# Patient Record
Sex: FEMALE | Race: WHITE | NOT HISPANIC OR LATINO | Employment: UNEMPLOYED | URBAN - NONMETROPOLITAN AREA
[De-identification: names, ages, dates, MRNs, and addresses within clinical notes are randomized per-mention and may not be internally consistent; named-entity substitution may affect disease eponyms.]

---

## 2023-01-01 ENCOUNTER — APPOINTMENT (OUTPATIENT)
Dept: CT IMAGING | Facility: HOSPITAL | Age: 83
End: 2023-01-01
Payer: MEDICARE

## 2023-01-01 ENCOUNTER — APPOINTMENT (OUTPATIENT)
Dept: MRI IMAGING | Facility: HOSPITAL | Age: 83
End: 2023-01-01
Payer: MEDICARE

## 2023-01-01 ENCOUNTER — APPOINTMENT (OUTPATIENT)
Dept: NUCLEAR MEDICINE | Facility: HOSPITAL | Age: 83
End: 2023-01-01
Payer: MEDICARE

## 2023-01-01 ENCOUNTER — APPOINTMENT (OUTPATIENT)
Dept: ULTRASOUND IMAGING | Facility: HOSPITAL | Age: 83
End: 2023-01-01
Payer: MEDICARE

## 2023-01-01 ENCOUNTER — HOSPITAL ENCOUNTER (INPATIENT)
Facility: HOSPITAL | Age: 83
LOS: 7 days | End: 2023-12-30
Attending: STUDENT IN AN ORGANIZED HEALTH CARE EDUCATION/TRAINING PROGRAM | Admitting: STUDENT IN AN ORGANIZED HEALTH CARE EDUCATION/TRAINING PROGRAM
Payer: MEDICARE

## 2023-01-01 ENCOUNTER — APPOINTMENT (OUTPATIENT)
Dept: GENERAL RADIOLOGY | Facility: HOSPITAL | Age: 83
End: 2023-01-01
Payer: MEDICARE

## 2023-01-01 ENCOUNTER — APPOINTMENT (OUTPATIENT)
Dept: CARDIOLOGY | Facility: HOSPITAL | Age: 83
End: 2023-01-01
Payer: MEDICARE

## 2023-01-01 VITALS
OXYGEN SATURATION: 97 % | HEIGHT: 62 IN | TEMPERATURE: 96.7 F | DIASTOLIC BLOOD PRESSURE: 52 MMHG | BODY MASS INDEX: 43.17 KG/M2 | WEIGHT: 234.6 LBS | HEART RATE: 90 BPM | RESPIRATION RATE: 20 BRPM | SYSTOLIC BLOOD PRESSURE: 98 MMHG

## 2023-01-01 DIAGNOSIS — R41.82 ALTERED MENTAL STATUS, UNSPECIFIED ALTERED MENTAL STATUS TYPE: ICD-10-CM

## 2023-01-01 DIAGNOSIS — N39.0 ACUTE UTI: Primary | ICD-10-CM

## 2023-01-01 LAB
A-A DO2: 8.6 MMHG (ref 0–300)
ABO GROUP BLD: NORMAL
ABO GROUP BLD: NORMAL
ALBUMIN SERPL-MCNC: 2.5 G/DL (ref 3.5–5.2)
ALBUMIN SERPL-MCNC: 2.5 G/DL (ref 3.5–5.2)
ALBUMIN SERPL-MCNC: 2.7 G/DL (ref 3.5–5.2)
ALBUMIN SERPL-MCNC: 2.7 G/DL (ref 3.5–5.2)
ALBUMIN SERPL-MCNC: 2.9 G/DL (ref 3.5–5.2)
ALBUMIN SERPL-MCNC: 2.9 G/DL (ref 3.5–5.2)
ALBUMIN/GLOB SERPL: 0.8 G/DL
ALBUMIN/GLOB SERPL: 0.9 G/DL
ALBUMIN/GLOB SERPL: 1 G/DL
ALP SERPL-CCNC: 193 U/L (ref 39–117)
ALP SERPL-CCNC: 208 U/L (ref 39–117)
ALP SERPL-CCNC: 213 U/L (ref 39–117)
ALP SERPL-CCNC: 245 U/L (ref 39–117)
ALP SERPL-CCNC: 335 U/L (ref 39–117)
ALP SERPL-CCNC: 357 U/L (ref 39–117)
ALT SERPL W P-5'-P-CCNC: 32 U/L (ref 1–33)
ALT SERPL W P-5'-P-CCNC: 36 U/L (ref 1–33)
ALT SERPL W P-5'-P-CCNC: 38 U/L (ref 1–33)
ALT SERPL W P-5'-P-CCNC: 38 U/L (ref 1–33)
ALT SERPL W P-5'-P-CCNC: 45 U/L (ref 1–33)
ALT SERPL W P-5'-P-CCNC: 48 U/L (ref 1–33)
AMMONIA BLD-SCNC: 21 UMOL/L (ref 11–51)
AMMONIA BLD-SCNC: 26 UMOL/L (ref 11–51)
ANION GAP SERPL CALCULATED.3IONS-SCNC: 11.1 MMOL/L (ref 5–15)
ANION GAP SERPL CALCULATED.3IONS-SCNC: 11.5 MMOL/L (ref 5–15)
ANION GAP SERPL CALCULATED.3IONS-SCNC: 13.6 MMOL/L (ref 5–15)
ANION GAP SERPL CALCULATED.3IONS-SCNC: 15.5 MMOL/L (ref 5–15)
ANION GAP SERPL CALCULATED.3IONS-SCNC: 16.4 MMOL/L (ref 5–15)
ANION GAP SERPL CALCULATED.3IONS-SCNC: 16.6 MMOL/L (ref 5–15)
ANION GAP SERPL CALCULATED.3IONS-SCNC: 18.2 MMOL/L (ref 5–15)
ANION GAP SERPL CALCULATED.3IONS-SCNC: 20.6 MMOL/L (ref 5–15)
ANION GAP SERPL CALCULATED.3IONS-SCNC: 9 MMOL/L (ref 5–15)
ANISOCYTOSIS BLD QL: NORMAL
APAP SERPL-MCNC: <5 MCG/ML (ref 0–30)
APTT PPP: 40.4 SECONDS (ref 26.5–34.5)
ARTERIAL PATENCY WRIST A: POSITIVE
AST SERPL-CCNC: 42 U/L (ref 1–32)
AST SERPL-CCNC: 48 U/L (ref 1–32)
AST SERPL-CCNC: 54 U/L (ref 1–32)
AST SERPL-CCNC: 55 U/L (ref 1–32)
AST SERPL-CCNC: 74 U/L (ref 1–32)
AST SERPL-CCNC: 86 U/L (ref 1–32)
ATMOSPHERIC PRESS: 723 MMHG
BACTERIA SPEC AEROBE CULT: ABNORMAL
BACTERIA SPEC AEROBE CULT: NORMAL
BACTERIA SPEC AEROBE CULT: NORMAL
BACTERIA UR QL AUTO: ABNORMAL /HPF
BASE EXCESS BLDA CALC-SCNC: -1.8 MMOL/L (ref 0–2)
BASOPHILS # BLD AUTO: 0.04 10*3/MM3 (ref 0–0.2)
BASOPHILS # BLD AUTO: 0.06 10*3/MM3 (ref 0–0.2)
BASOPHILS # BLD AUTO: 0.06 10*3/MM3 (ref 0–0.2)
BASOPHILS # BLD AUTO: 0.07 10*3/MM3 (ref 0–0.2)
BASOPHILS # BLD AUTO: 0.08 10*3/MM3 (ref 0–0.2)
BASOPHILS NFR BLD AUTO: 0.5 % (ref 0–1.5)
BASOPHILS NFR BLD AUTO: 0.6 % (ref 0–1.5)
BASOPHILS NFR BLD AUTO: 0.8 % (ref 0–1.5)
BASOPHILS NFR BLD AUTO: 0.8 % (ref 0–1.5)
BASOPHILS NFR BLD AUTO: 0.9 % (ref 0–1.5)
BASOPHILS NFR BLD AUTO: 0.9 % (ref 0–1.5)
BASOPHILS NFR BLD AUTO: 1.2 % (ref 0–1.5)
BDY SITE: ABNORMAL
BH CV ECHO MEAS - ACS: 1.7 CM
BH CV ECHO MEAS - AI P1/2T: 354.2 MSEC
BH CV ECHO MEAS - AO MAX PG: 12.8 MMHG
BH CV ECHO MEAS - AO MEAN PG: 8 MMHG
BH CV ECHO MEAS - AO ROOT DIAM: 2.9 CM
BH CV ECHO MEAS - AO V2 MAX: 179 CM/SEC
BH CV ECHO MEAS - AO V2 VTI: 30.5 CM
BH CV ECHO MEAS - EDV(CUBED): 64 ML
BH CV ECHO MEAS - EDV(MOD-SP4): 43.7 ML
BH CV ECHO MEAS - EF(MOD-BP): 67 %
BH CV ECHO MEAS - EF(MOD-SP4): 68 %
BH CV ECHO MEAS - ESV(CUBED): 15.6 ML
BH CV ECHO MEAS - ESV(MOD-SP4): 14 ML
BH CV ECHO MEAS - FS: 37.5 %
BH CV ECHO MEAS - IVS/LVPW: 0.92 CM
BH CV ECHO MEAS - IVSD: 1.1 CM
BH CV ECHO MEAS - LA DIMENSION: 3.8 CM
BH CV ECHO MEAS - LAT PEAK E' VEL: 13.2 CM/SEC
BH CV ECHO MEAS - LV DIASTOLIC VOL/BSA (35-75): 21.9 CM2
BH CV ECHO MEAS - LV MASS(C)D: 155.4 GRAMS
BH CV ECHO MEAS - LV SYSTOLIC VOL/BSA (12-30): 7 CM2
BH CV ECHO MEAS - LVIDD: 4 CM
BH CV ECHO MEAS - LVIDS: 2.5 CM
BH CV ECHO MEAS - LVOT AREA: 3.1 CM2
BH CV ECHO MEAS - LVOT DIAM: 2 CM
BH CV ECHO MEAS - LVPWD: 1.2 CM
BH CV ECHO MEAS - MED PEAK E' VEL: 8.9 CM/SEC
BH CV ECHO MEAS - MV E MAX VEL: 154 CM/SEC
BH CV ECHO MEAS - PA ACC TIME: 0.07 SEC
BH CV ECHO MEAS - RAP SYSTOLE: 10 MMHG
BH CV ECHO MEAS - RVSP: 36.2 MMHG
BH CV ECHO MEAS - SI(MOD-SP4): 14.9 ML/M2
BH CV ECHO MEAS - SV(MOD-SP4): 29.7 ML
BH CV ECHO MEAS - TAPSE (>1.6): 2.44 CM
BH CV ECHO MEAS - TR MAX PG: 26.2 MMHG
BH CV ECHO MEAS - TR MAX VEL: 256 CM/SEC
BH CV ECHO MEASUREMENTS AVERAGE E/E' RATIO: 13.94
BILIRUB CONJ SERPL-MCNC: 0.6 MG/DL (ref 0–0.3)
BILIRUB CONJ SERPL-MCNC: 0.9 MG/DL (ref 0–0.3)
BILIRUB CONJ SERPL-MCNC: 1.9 MG/DL (ref 0–0.3)
BILIRUB CONJ SERPL-MCNC: 2.1 MG/DL (ref 0–0.3)
BILIRUB INDIRECT SERPL-MCNC: 0.4 MG/DL
BILIRUB INDIRECT SERPL-MCNC: 0.7 MG/DL
BILIRUB SERPL-MCNC: 1 MG/DL (ref 0–1.2)
BILIRUB SERPL-MCNC: 1.5 MG/DL (ref 0–1.2)
BILIRUB SERPL-MCNC: 1.6 MG/DL (ref 0–1.2)
BILIRUB SERPL-MCNC: 1.7 MG/DL (ref 0–1.2)
BILIRUB SERPL-MCNC: 1.7 MG/DL (ref 0–1.2)
BILIRUB SERPL-MCNC: 2.8 MG/DL (ref 0–1.2)
BILIRUB SERPL-MCNC: 3.1 MG/DL (ref 0–1.2)
BILIRUB UR QL STRIP: NEGATIVE
BLD GP AB SCN SERPL QL: NEGATIVE
BUN SERPL-MCNC: 27 MG/DL (ref 8–23)
BUN SERPL-MCNC: 28 MG/DL (ref 8–23)
BUN SERPL-MCNC: 29 MG/DL (ref 8–23)
BUN SERPL-MCNC: 29 MG/DL (ref 8–23)
BUN SERPL-MCNC: 30 MG/DL (ref 8–23)
BUN SERPL-MCNC: 35 MG/DL (ref 8–23)
BUN SERPL-MCNC: 40 MG/DL (ref 8–23)
BUN SERPL-MCNC: 40 MG/DL (ref 8–23)
BUN SERPL-MCNC: 41 MG/DL (ref 8–23)
BUN/CREAT SERPL: 15.8 (ref 7–25)
BUN/CREAT SERPL: 15.9 (ref 7–25)
BUN/CREAT SERPL: 18.4 (ref 7–25)
BUN/CREAT SERPL: 20.3 (ref 7–25)
BUN/CREAT SERPL: 21.4 (ref 7–25)
BUN/CREAT SERPL: 24.6 (ref 7–25)
BUN/CREAT SERPL: 26.9 (ref 7–25)
BUN/CREAT SERPL: 29.4 (ref 7–25)
BUN/CREAT SERPL: 31.8 (ref 7–25)
CALCIUM SPEC-SCNC: 8.2 MG/DL (ref 8.6–10.5)
CALCIUM SPEC-SCNC: 8.3 MG/DL (ref 8.6–10.5)
CALCIUM SPEC-SCNC: 8.3 MG/DL (ref 8.6–10.5)
CALCIUM SPEC-SCNC: 8.5 MG/DL (ref 8.6–10.5)
CALCIUM SPEC-SCNC: 8.6 MG/DL (ref 8.6–10.5)
CALCIUM SPEC-SCNC: 8.8 MG/DL (ref 8.6–10.5)
CALCIUM SPEC-SCNC: 8.9 MG/DL (ref 8.6–10.5)
CHLORIDE SERPL-SCNC: 102 MMOL/L (ref 98–107)
CHLORIDE SERPL-SCNC: 103 MMOL/L (ref 98–107)
CHLORIDE SERPL-SCNC: 104 MMOL/L (ref 98–107)
CHLORIDE SERPL-SCNC: 105 MMOL/L (ref 98–107)
CHLORIDE SERPL-SCNC: 105 MMOL/L (ref 98–107)
CHLORIDE SERPL-SCNC: 106 MMOL/L (ref 98–107)
CHLORIDE SERPL-SCNC: 106 MMOL/L (ref 98–107)
CHLORIDE SERPL-SCNC: 99 MMOL/L (ref 98–107)
CHLORIDE SERPL-SCNC: 99 MMOL/L (ref 98–107)
CK SERPL-CCNC: 99 U/L (ref 20–180)
CLARITY UR: ABNORMAL
CO2 BLDA-SCNC: 23.7 MMOL/L (ref 22–33)
CO2 SERPL-SCNC: 17.5 MMOL/L (ref 22–29)
CO2 SERPL-SCNC: 17.8 MMOL/L (ref 22–29)
CO2 SERPL-SCNC: 18.4 MMOL/L (ref 22–29)
CO2 SERPL-SCNC: 18.6 MMOL/L (ref 22–29)
CO2 SERPL-SCNC: 20.4 MMOL/L (ref 22–29)
CO2 SERPL-SCNC: 21.4 MMOL/L (ref 22–29)
CO2 SERPL-SCNC: 22.5 MMOL/L (ref 22–29)
CO2 SERPL-SCNC: 24.9 MMOL/L (ref 22–29)
CO2 SERPL-SCNC: 26 MMOL/L (ref 22–29)
COHGB MFR BLD: 1.3 % (ref 0–5)
COLOR UR: YELLOW
CREAT BLDA-MCNC: 1.4 MG/DL (ref 0.6–1.3)
CREAT SERPL-MCNC: 1.08 MG/DL (ref 0.57–1)
CREAT SERPL-MCNC: 1.22 MG/DL (ref 0.57–1)
CREAT SERPL-MCNC: 1.29 MG/DL (ref 0.57–1)
CREAT SERPL-MCNC: 1.31 MG/DL (ref 0.57–1)
CREAT SERPL-MCNC: 1.33 MG/DL (ref 0.57–1)
CREAT SERPL-MCNC: 1.36 MG/DL (ref 0.57–1)
CREAT SERPL-MCNC: 1.58 MG/DL (ref 0.57–1)
CREAT SERPL-MCNC: 2.21 MG/DL (ref 0.57–1)
CREAT SERPL-MCNC: 2.51 MG/DL (ref 0.57–1)
CRP SERPL-MCNC: 3.26 MG/DL (ref 0–0.5)
CRP SERPL-MCNC: 3.87 MG/DL (ref 0–0.5)
D-LACTATE SERPL-SCNC: 2 MMOL/L (ref 0.5–2)
D-LACTATE SERPL-SCNC: 2.2 MMOL/L (ref 0.5–2)
D-LACTATE SERPL-SCNC: 2.4 MMOL/L (ref 0.5–2)
D-LACTATE SERPL-SCNC: 2.9 MMOL/L (ref 0.5–2)
D-LACTATE SERPL-SCNC: 3.1 MMOL/L (ref 0.5–2)
D-LACTATE SERPL-SCNC: 3.3 MMOL/L (ref 0.5–2)
D-LACTATE SERPL-SCNC: 4.3 MMOL/L (ref 0.5–2)
D-LACTATE SERPL-SCNC: 5 MMOL/L (ref 0.5–2)
D-LACTATE SERPL-SCNC: 5.7 MMOL/L (ref 0.5–2)
D-LACTATE SERPL-SCNC: 8.9 MMOL/L (ref 0.5–2)
DACRYOCYTES BLD QL SMEAR: NORMAL
DEPRECATED RDW RBC AUTO: 80.6 FL (ref 37–54)
DEPRECATED RDW RBC AUTO: 82 FL (ref 37–54)
DEPRECATED RDW RBC AUTO: 82.9 FL (ref 37–54)
DEPRECATED RDW RBC AUTO: 85 FL (ref 37–54)
DEPRECATED RDW RBC AUTO: 85.1 FL (ref 37–54)
DEPRECATED RDW RBC AUTO: 86 FL (ref 37–54)
DEPRECATED RDW RBC AUTO: 86.2 FL (ref 37–54)
DEPRECATED RDW RBC AUTO: 87.3 FL (ref 37–54)
DEPRECATED RDW RBC AUTO: 88.9 FL (ref 37–54)
DIGOXIN SERPL-MCNC: 1.4 NG/ML (ref 0.6–1.2)
EGFRCR SERPLBLD CKD-EPI 2021: 18.6 ML/MIN/1.73
EGFRCR SERPLBLD CKD-EPI 2021: 21.6 ML/MIN/1.73
EGFRCR SERPLBLD CKD-EPI 2021: 32.4 ML/MIN/1.73
EGFRCR SERPLBLD CKD-EPI 2021: 38.7 ML/MIN/1.73
EGFRCR SERPLBLD CKD-EPI 2021: 39.8 ML/MIN/1.73
EGFRCR SERPLBLD CKD-EPI 2021: 40.5 ML/MIN/1.73
EGFRCR SERPLBLD CKD-EPI 2021: 41.3 ML/MIN/1.73
EGFRCR SERPLBLD CKD-EPI 2021: 44.1 ML/MIN/1.73
EGFRCR SERPLBLD CKD-EPI 2021: 51.1 ML/MIN/1.73
EOSINOPHIL # BLD AUTO: 0.05 10*3/MM3 (ref 0–0.4)
EOSINOPHIL # BLD AUTO: 0.22 10*3/MM3 (ref 0–0.4)
EOSINOPHIL # BLD AUTO: 0.25 10*3/MM3 (ref 0–0.4)
EOSINOPHIL # BLD AUTO: 0.37 10*3/MM3 (ref 0–0.4)
EOSINOPHIL # BLD AUTO: 0.45 10*3/MM3 (ref 0–0.4)
EOSINOPHIL # BLD AUTO: 0.46 10*3/MM3 (ref 0–0.4)
EOSINOPHIL # BLD AUTO: 0.47 10*3/MM3 (ref 0–0.4)
EOSINOPHIL NFR BLD AUTO: 0.8 % (ref 0.3–6.2)
EOSINOPHIL NFR BLD AUTO: 2.3 % (ref 0.3–6.2)
EOSINOPHIL NFR BLD AUTO: 2.3 % (ref 0.3–6.2)
EOSINOPHIL NFR BLD AUTO: 4.3 % (ref 0.3–6.2)
EOSINOPHIL NFR BLD AUTO: 5.4 % (ref 0.3–6.2)
EOSINOPHIL NFR BLD AUTO: 6.8 % (ref 0.3–6.2)
EOSINOPHIL NFR BLD AUTO: 7.3 % (ref 0.3–6.2)
ERYTHROCYTE [DISTWIDTH] IN BLOOD BY AUTOMATED COUNT: 23.2 % (ref 12.3–15.4)
ERYTHROCYTE [DISTWIDTH] IN BLOOD BY AUTOMATED COUNT: 23.2 % (ref 12.3–15.4)
ERYTHROCYTE [DISTWIDTH] IN BLOOD BY AUTOMATED COUNT: 23.4 % (ref 12.3–15.4)
ERYTHROCYTE [DISTWIDTH] IN BLOOD BY AUTOMATED COUNT: 23.6 % (ref 12.3–15.4)
ERYTHROCYTE [DISTWIDTH] IN BLOOD BY AUTOMATED COUNT: 23.6 % (ref 12.3–15.4)
ERYTHROCYTE [DISTWIDTH] IN BLOOD BY AUTOMATED COUNT: 23.7 % (ref 12.3–15.4)
ERYTHROCYTE [DISTWIDTH] IN BLOOD BY AUTOMATED COUNT: 23.8 % (ref 12.3–15.4)
ERYTHROCYTE [DISTWIDTH] IN BLOOD BY AUTOMATED COUNT: 23.9 % (ref 12.3–15.4)
ERYTHROCYTE [DISTWIDTH] IN BLOOD BY AUTOMATED COUNT: 23.9 % (ref 12.3–15.4)
ERYTHROCYTE [SEDIMENTATION RATE] IN BLOOD: 6 MM/HR (ref 0–30)
FERRITIN SERPL-MCNC: 483.1 NG/ML (ref 13–150)
GAS FLOW AIRWAY: 3 LPM
GLOBULIN UR ELPH-MCNC: 2.9 GM/DL
GLOBULIN UR ELPH-MCNC: 3 GM/DL
GLOBULIN UR ELPH-MCNC: 3.1 GM/DL
GLOBULIN UR ELPH-MCNC: 3.2 GM/DL
GLOBULIN UR ELPH-MCNC: 3.3 GM/DL
GLUCOSE BLDC GLUCOMTR-MCNC: 105 MG/DL (ref 70–130)
GLUCOSE BLDC GLUCOMTR-MCNC: 87 MG/DL (ref 70–130)
GLUCOSE SERPL-MCNC: 101 MG/DL (ref 65–99)
GLUCOSE SERPL-MCNC: 108 MG/DL (ref 65–99)
GLUCOSE SERPL-MCNC: 109 MG/DL (ref 65–99)
GLUCOSE SERPL-MCNC: 83 MG/DL (ref 65–99)
GLUCOSE SERPL-MCNC: 84 MG/DL (ref 65–99)
GLUCOSE SERPL-MCNC: 87 MG/DL (ref 65–99)
GLUCOSE SERPL-MCNC: 92 MG/DL (ref 65–99)
GLUCOSE SERPL-MCNC: 97 MG/DL (ref 65–99)
GLUCOSE SERPL-MCNC: 99 MG/DL (ref 65–99)
GLUCOSE UR STRIP-MCNC: NEGATIVE MG/DL
HAV IGM SERPL QL IA: NORMAL
HBV CORE IGM SERPL QL IA: NORMAL
HBV SURFACE AG SERPL QL IA: NORMAL
HCO3 BLDA-SCNC: 22.6 MMOL/L (ref 20–26)
HCT VFR BLD AUTO: 20.3 % (ref 34–46.6)
HCT VFR BLD AUTO: 26 % (ref 34–46.6)
HCT VFR BLD AUTO: 26.4 % (ref 34–46.6)
HCT VFR BLD AUTO: 27.2 % (ref 34–46.6)
HCT VFR BLD AUTO: 29.9 % (ref 34–46.6)
HCT VFR BLD AUTO: 31.2 % (ref 34–46.6)
HCT VFR BLD AUTO: 32.2 % (ref 34–46.6)
HCT VFR BLD AUTO: 32.7 % (ref 34–46.6)
HCT VFR BLD AUTO: 34 % (ref 34–46.6)
HCT VFR BLD AUTO: 34.4 % (ref 34–46.6)
HCT VFR BLD CALC: 28.5 % (ref 38–51)
HCV AB SER DONR QL: NORMAL
HGB BLD-MCNC: 10.1 G/DL (ref 12–15.9)
HGB BLD-MCNC: 10.2 G/DL (ref 12–15.9)
HGB BLD-MCNC: 10.9 G/DL (ref 12–15.9)
HGB BLD-MCNC: 10.9 G/DL (ref 12–15.9)
HGB BLD-MCNC: 11.2 G/DL (ref 12–15.9)
HGB BLD-MCNC: 6.5 G/DL (ref 12–15.9)
HGB BLD-MCNC: 8.4 G/DL (ref 12–15.9)
HGB BLD-MCNC: 8.4 G/DL (ref 12–15.9)
HGB BLD-MCNC: 8.9 G/DL (ref 12–15.9)
HGB BLD-MCNC: 9.4 G/DL (ref 12–15.9)
HGB BLDA-MCNC: 9.3 G/DL (ref 13.5–17.5)
HGB UR QL STRIP.AUTO: ABNORMAL
HOLD SPECIMEN: NORMAL
HOLD SPECIMEN: NORMAL
HYALINE CASTS UR QL AUTO: ABNORMAL /LPF
HYPOCHROMIA BLD QL: NORMAL
IMM GRANULOCYTES # BLD AUTO: 0.03 10*3/MM3 (ref 0–0.05)
IMM GRANULOCYTES # BLD AUTO: 0.04 10*3/MM3 (ref 0–0.05)
IMM GRANULOCYTES # BLD AUTO: 0.04 10*3/MM3 (ref 0–0.05)
IMM GRANULOCYTES # BLD AUTO: 0.05 10*3/MM3 (ref 0–0.05)
IMM GRANULOCYTES NFR BLD AUTO: 0.3 % (ref 0–0.5)
IMM GRANULOCYTES NFR BLD AUTO: 0.5 % (ref 0–0.5)
INHALED O2 CONCENTRATION: 32 %
INR PPP: 2.09 (ref 0.9–1.1)
INR PPP: 2.3 (ref 0.9–1.1)
INR PPP: 2.55 (ref 0.9–1.1)
INR PPP: 2.63 (ref 0.9–1.1)
INR PPP: 2.81 (ref 0.9–1.1)
INR PPP: 3.14 (ref 0.9–1.1)
IRON 24H UR-MRATE: 188 MCG/DL (ref 37–145)
IRON SATN MFR SERPL: 83 % (ref 20–50)
KETONES UR QL STRIP: NEGATIVE
LEFT ATRIUM VOLUME INDEX: 30.2 ML/M2
LEUKOCYTE ESTERASE UR QL STRIP.AUTO: ABNORMAL
LYMPHOCYTES # BLD AUTO: 1.32 10*3/MM3 (ref 0.7–3.1)
LYMPHOCYTES # BLD AUTO: 1.32 10*3/MM3 (ref 0.7–3.1)
LYMPHOCYTES # BLD AUTO: 1.34 10*3/MM3 (ref 0.7–3.1)
LYMPHOCYTES # BLD AUTO: 1.48 10*3/MM3 (ref 0.7–3.1)
LYMPHOCYTES # BLD AUTO: 1.53 10*3/MM3 (ref 0.7–3.1)
LYMPHOCYTES # BLD AUTO: 1.84 10*3/MM3 (ref 0.7–3.1)
LYMPHOCYTES # BLD AUTO: 2.01 10*3/MM3 (ref 0.7–3.1)
LYMPHOCYTES NFR BLD AUTO: 13.3 % (ref 19.6–45.3)
LYMPHOCYTES NFR BLD AUTO: 13.6 % (ref 19.6–45.3)
LYMPHOCYTES NFR BLD AUTO: 20.4 % (ref 19.6–45.3)
LYMPHOCYTES NFR BLD AUTO: 20.5 % (ref 19.6–45.3)
LYMPHOCYTES NFR BLD AUTO: 21.7 % (ref 19.6–45.3)
LYMPHOCYTES NFR BLD AUTO: 23.2 % (ref 19.6–45.3)
LYMPHOCYTES NFR BLD AUTO: 23.6 % (ref 19.6–45.3)
Lab: ABNORMAL
MACROCYTES BLD QL SMEAR: NORMAL
MAGNESIUM SERPL-MCNC: 2.3 MG/DL (ref 1.6–2.4)
MAGNESIUM SERPL-MCNC: 2.4 MG/DL (ref 1.6–2.4)
MCH RBC QN AUTO: 31.8 PG (ref 26.6–33)
MCH RBC QN AUTO: 31.9 PG (ref 26.6–33)
MCH RBC QN AUTO: 32 PG (ref 26.6–33)
MCH RBC QN AUTO: 32 PG (ref 26.6–33)
MCH RBC QN AUTO: 32.1 PG (ref 26.6–33)
MCH RBC QN AUTO: 32.2 PG (ref 26.6–33)
MCH RBC QN AUTO: 32.3 PG (ref 26.6–33)
MCHC RBC AUTO-ENTMCNC: 31.4 G/DL (ref 31.5–35.7)
MCHC RBC AUTO-ENTMCNC: 31.7 G/DL (ref 31.5–35.7)
MCHC RBC AUTO-ENTMCNC: 31.7 G/DL (ref 31.5–35.7)
MCHC RBC AUTO-ENTMCNC: 31.8 G/DL (ref 31.5–35.7)
MCHC RBC AUTO-ENTMCNC: 32.3 G/DL (ref 31.5–35.7)
MCHC RBC AUTO-ENTMCNC: 32.4 G/DL (ref 31.5–35.7)
MCHC RBC AUTO-ENTMCNC: 32.7 G/DL (ref 31.5–35.7)
MCHC RBC AUTO-ENTMCNC: 32.9 G/DL (ref 31.5–35.7)
MCHC RBC AUTO-ENTMCNC: 33.3 G/DL (ref 31.5–35.7)
MCV RBC AUTO: 100 FL (ref 79–97)
MCV RBC AUTO: 100.6 FL (ref 79–97)
MCV RBC AUTO: 101.7 FL (ref 79–97)
MCV RBC AUTO: 101.9 FL (ref 79–97)
MCV RBC AUTO: 96.5 FL (ref 79–97)
MCV RBC AUTO: 97.4 FL (ref 79–97)
MCV RBC AUTO: 97.8 FL (ref 79–97)
MCV RBC AUTO: 99 FL (ref 79–97)
MCV RBC AUTO: 99.2 FL (ref 79–97)
METHGB BLD QL: 0.3 % (ref 0–3)
MODALITY: ABNORMAL
MONOCYTES # BLD AUTO: 0.36 10*3/MM3 (ref 0.1–0.9)
MONOCYTES # BLD AUTO: 0.36 10*3/MM3 (ref 0.1–0.9)
MONOCYTES # BLD AUTO: 0.42 10*3/MM3 (ref 0.1–0.9)
MONOCYTES # BLD AUTO: 0.65 10*3/MM3 (ref 0.1–0.9)
MONOCYTES # BLD AUTO: 0.68 10*3/MM3 (ref 0.1–0.9)
MONOCYTES # BLD AUTO: 0.68 10*3/MM3 (ref 0.1–0.9)
MONOCYTES # BLD AUTO: 0.73 10*3/MM3 (ref 0.1–0.9)
MONOCYTES NFR BLD AUTO: 5.6 % (ref 5–12)
MONOCYTES NFR BLD AUTO: 5.6 % (ref 5–12)
MONOCYTES NFR BLD AUTO: 6.1 % (ref 5–12)
MONOCYTES NFR BLD AUTO: 6.4 % (ref 5–12)
MONOCYTES NFR BLD AUTO: 6.7 % (ref 5–12)
MONOCYTES NFR BLD AUTO: 7.9 % (ref 5–12)
MONOCYTES NFR BLD AUTO: 8.6 % (ref 5–12)
NEUTROPHILS NFR BLD AUTO: 4.02 10*3/MM3 (ref 1.7–7)
NEUTROPHILS NFR BLD AUTO: 4.26 10*3/MM3 (ref 1.7–7)
NEUTROPHILS NFR BLD AUTO: 4.63 10*3/MM3 (ref 1.7–7)
NEUTROPHILS NFR BLD AUTO: 5.32 10*3/MM3 (ref 1.7–7)
NEUTROPHILS NFR BLD AUTO: 5.48 10*3/MM3 (ref 1.7–7)
NEUTROPHILS NFR BLD AUTO: 62.1 % (ref 42.7–76)
NEUTROPHILS NFR BLD AUTO: 62.9 % (ref 42.7–76)
NEUTROPHILS NFR BLD AUTO: 63.3 % (ref 42.7–76)
NEUTROPHILS NFR BLD AUTO: 64.7 % (ref 42.7–76)
NEUTROPHILS NFR BLD AUTO: 7.4 10*3/MM3 (ref 1.7–7)
NEUTROPHILS NFR BLD AUTO: 72 % (ref 42.7–76)
NEUTROPHILS NFR BLD AUTO: 76.3 % (ref 42.7–76)
NEUTROPHILS NFR BLD AUTO: 77.3 % (ref 42.7–76)
NEUTROPHILS NFR BLD AUTO: 8.57 10*3/MM3 (ref 1.7–7)
NITRITE UR QL STRIP: NEGATIVE
NRBC BLD AUTO-RTO: 0 /100 WBC (ref 0–0.2)
NRBC BLD AUTO-RTO: 0.3 /100 WBC (ref 0–0.2)
OXYHGB MFR BLDV: 98.5 % (ref 94–99)
PCO2 BLDA: 35.8 MM HG (ref 35–45)
PCO2 TEMP ADJ BLD: ABNORMAL MM[HG]
PH BLDA: 7.41 PH UNITS (ref 7.35–7.45)
PH UR STRIP.AUTO: 5.5 [PH] (ref 5–8)
PH, TEMP CORRECTED: ABNORMAL
PLAT MORPH BLD: NORMAL
PLATELET # BLD AUTO: 113 10*3/MM3 (ref 140–450)
PLATELET # BLD AUTO: 116 10*3/MM3 (ref 140–450)
PLATELET # BLD AUTO: 119 10*3/MM3 (ref 140–450)
PLATELET # BLD AUTO: 126 10*3/MM3 (ref 140–450)
PLATELET # BLD AUTO: 126 10*3/MM3 (ref 140–450)
PLATELET # BLD AUTO: 127 10*3/MM3 (ref 140–450)
PLATELET # BLD AUTO: 132 10*3/MM3 (ref 140–450)
PLATELET # BLD AUTO: 151 10*3/MM3 (ref 140–450)
PLATELET # BLD AUTO: 172 10*3/MM3 (ref 140–450)
PMV BLD AUTO: 10.1 FL (ref 6–12)
PMV BLD AUTO: 10.2 FL (ref 6–12)
PMV BLD AUTO: 10.5 FL (ref 6–12)
PMV BLD AUTO: 10.5 FL (ref 6–12)
PMV BLD AUTO: 10.7 FL (ref 6–12)
PMV BLD AUTO: 10.7 FL (ref 6–12)
PMV BLD AUTO: 9.2 FL (ref 6–12)
PMV BLD AUTO: 9.4 FL (ref 6–12)
PMV BLD AUTO: 9.8 FL (ref 6–12)
PO2 BLDA: 168 MM HG (ref 83–108)
PO2 TEMP ADJ BLD: ABNORMAL MM[HG]
POIKILOCYTOSIS BLD QL SMEAR: NORMAL
POTASSIUM SERPL-SCNC: 3.1 MMOL/L (ref 3.5–5.2)
POTASSIUM SERPL-SCNC: 3.1 MMOL/L (ref 3.5–5.2)
POTASSIUM SERPL-SCNC: 3.2 MMOL/L (ref 3.5–5.2)
POTASSIUM SERPL-SCNC: 3.3 MMOL/L (ref 3.5–5.2)
POTASSIUM SERPL-SCNC: 3.4 MMOL/L (ref 3.5–5.2)
POTASSIUM SERPL-SCNC: 3.6 MMOL/L (ref 3.5–5.2)
POTASSIUM SERPL-SCNC: 3.9 MMOL/L (ref 3.5–5.2)
POTASSIUM SERPL-SCNC: 3.9 MMOL/L (ref 3.5–5.2)
POTASSIUM SERPL-SCNC: 4 MMOL/L (ref 3.5–5.2)
PROCALCITONIN SERPL-MCNC: 0.14 NG/ML (ref 0–0.25)
PROCALCITONIN SERPL-MCNC: 0.15 NG/ML (ref 0–0.25)
PROT SERPL-MCNC: 5.4 G/DL (ref 6–8.5)
PROT SERPL-MCNC: 5.7 G/DL (ref 6–8.5)
PROT SERPL-MCNC: 5.7 G/DL (ref 6–8.5)
PROT SERPL-MCNC: 5.8 G/DL (ref 6–8.5)
PROT SERPL-MCNC: 5.9 G/DL (ref 6–8.5)
PROT SERPL-MCNC: 6.2 G/DL (ref 6–8.5)
PROT UR QL STRIP: NEGATIVE
PROTHROMBIN TIME: 24.1 SECONDS (ref 12.1–14.7)
PROTHROMBIN TIME: 26 SECONDS (ref 12.1–14.7)
PROTHROMBIN TIME: 28.1 SECONDS (ref 12.1–14.7)
PROTHROMBIN TIME: 28.8 SECONDS (ref 12.1–14.7)
PROTHROMBIN TIME: 30.3 SECONDS (ref 12.1–14.7)
PROTHROMBIN TIME: 33.1 SECONDS (ref 12.1–14.7)
QT INTERVAL: 364 MS
QTC INTERVAL: 433 MS
RBC # BLD AUTO: 2.62 10*6/MM3 (ref 3.77–5.28)
RBC # BLD AUTO: 2.64 10*6/MM3 (ref 3.77–5.28)
RBC # BLD AUTO: 2.78 10*6/MM3 (ref 3.77–5.28)
RBC # BLD AUTO: 2.94 10*6/MM3 (ref 3.77–5.28)
RBC # BLD AUTO: 3.15 10*6/MM3 (ref 3.77–5.28)
RBC # BLD AUTO: 3.16 10*6/MM3 (ref 3.77–5.28)
RBC # BLD AUTO: 3.39 10*6/MM3 (ref 3.77–5.28)
RBC # BLD AUTO: 3.42 10*6/MM3 (ref 3.77–5.28)
RBC # BLD AUTO: 3.49 10*6/MM3 (ref 3.77–5.28)
RBC # UR STRIP: ABNORMAL /HPF
REF LAB TEST METHOD: ABNORMAL
RETICS # AUTO: 0.03 10*6/MM3 (ref 0.02–0.13)
RETICS/RBC NFR AUTO: 1.03 % (ref 0.7–1.9)
RH BLD: POSITIVE
RH BLD: POSITIVE
SAO2 % BLDCOA: >99.2 % (ref 94–99)
SMALL PLATELETS BLD QL SMEAR: NORMAL
SODIUM SERPL-SCNC: 135 MMOL/L (ref 136–145)
SODIUM SERPL-SCNC: 136 MMOL/L (ref 136–145)
SODIUM SERPL-SCNC: 137 MMOL/L (ref 136–145)
SODIUM SERPL-SCNC: 137 MMOL/L (ref 136–145)
SODIUM SERPL-SCNC: 138 MMOL/L (ref 136–145)
SODIUM SERPL-SCNC: 139 MMOL/L (ref 136–145)
SODIUM SERPL-SCNC: 140 MMOL/L (ref 136–145)
SODIUM SERPL-SCNC: 142 MMOL/L (ref 136–145)
SODIUM SERPL-SCNC: 145 MMOL/L (ref 136–145)
SP GR UR STRIP: 1.03 (ref 1–1.03)
SQUAMOUS #/AREA URNS HPF: ABNORMAL /HPF
T&S EXPIRATION DATE: NORMAL
TIBC SERPL-MCNC: 226 MCG/DL (ref 298–536)
TRANSFERRIN SERPL-MCNC: 152 MG/DL (ref 200–360)
TROPONIN T SERPL HS-MCNC: 25 NG/L
TROPONIN T SERPL HS-MCNC: 27 NG/L
UROBILINOGEN UR QL STRIP: ABNORMAL
VENTILATOR MODE: ABNORMAL
WBC # UR STRIP: ABNORMAL /HPF
WBC NRBC COR # BLD AUTO: 11.09 10*3/MM3 (ref 3.4–10.8)
WBC NRBC COR # BLD AUTO: 6.43 10*3/MM3 (ref 3.4–10.8)
WBC NRBC COR # BLD AUTO: 6.47 10*3/MM3 (ref 3.4–10.8)
WBC NRBC COR # BLD AUTO: 6.58 10*3/MM3 (ref 3.4–10.8)
WBC NRBC COR # BLD AUTO: 8.47 10*3/MM3 (ref 3.4–10.8)
WBC NRBC COR # BLD AUTO: 8.52 10*3/MM3 (ref 3.4–10.8)
WBC NRBC COR # BLD AUTO: 8.65 10*3/MM3 (ref 3.4–10.8)
WBC NRBC COR # BLD AUTO: 8.74 10*3/MM3 (ref 3.4–10.8)
WBC NRBC COR # BLD AUTO: 9.7 10*3/MM3 (ref 3.4–10.8)
WHOLE BLOOD HOLD COAG: NORMAL
WHOLE BLOOD HOLD SPECIMEN: NORMAL
YEAST URNS QL MICRO: ABNORMAL /HPF

## 2023-01-01 PROCEDURE — 94760 N-INVAS EAR/PLS OXIMETRY 1: CPT

## 2023-01-01 PROCEDURE — 85610 PROTHROMBIN TIME: CPT | Performed by: STUDENT IN AN ORGANIZED HEALTH CARE EDUCATION/TRAINING PROGRAM

## 2023-01-01 PROCEDURE — 82140 ASSAY OF AMMONIA: CPT | Performed by: STUDENT IN AN ORGANIZED HEALTH CARE EDUCATION/TRAINING PROGRAM

## 2023-01-01 PROCEDURE — 94799 UNLISTED PULMONARY SVC/PX: CPT

## 2023-01-01 PROCEDURE — 99232 SBSQ HOSP IP/OBS MODERATE 35: CPT | Performed by: STUDENT IN AN ORGANIZED HEALTH CARE EDUCATION/TRAINING PROGRAM

## 2023-01-01 PROCEDURE — 94761 N-INVAS EAR/PLS OXIMETRY MLT: CPT

## 2023-01-01 PROCEDURE — 70498 CT ANGIOGRAPHY NECK: CPT | Performed by: RADIOLOGY

## 2023-01-01 PROCEDURE — 87086 URINE CULTURE/COLONY COUNT: CPT | Performed by: STUDENT IN AN ORGANIZED HEALTH CARE EDUCATION/TRAINING PROGRAM

## 2023-01-01 PROCEDURE — 74181 MRI ABDOMEN W/O CONTRAST: CPT

## 2023-01-01 PROCEDURE — 25810000003 SODIUM CHLORIDE 0.9 % SOLUTION: Performed by: STUDENT IN AN ORGANIZED HEALTH CARE EDUCATION/TRAINING PROGRAM

## 2023-01-01 PROCEDURE — 80053 COMPREHEN METABOLIC PANEL: CPT | Performed by: STUDENT IN AN ORGANIZED HEALTH CARE EDUCATION/TRAINING PROGRAM

## 2023-01-01 PROCEDURE — 85007 BL SMEAR W/DIFF WBC COUNT: CPT | Performed by: STUDENT IN AN ORGANIZED HEALTH CARE EDUCATION/TRAINING PROGRAM

## 2023-01-01 PROCEDURE — 83605 ASSAY OF LACTIC ACID: CPT | Performed by: STUDENT IN AN ORGANIZED HEALTH CARE EDUCATION/TRAINING PROGRAM

## 2023-01-01 PROCEDURE — 85027 COMPLETE CBC AUTOMATED: CPT | Performed by: STUDENT IN AN ORGANIZED HEALTH CARE EDUCATION/TRAINING PROGRAM

## 2023-01-01 PROCEDURE — 84466 ASSAY OF TRANSFERRIN: CPT | Performed by: STUDENT IN AN ORGANIZED HEALTH CARE EDUCATION/TRAINING PROGRAM

## 2023-01-01 PROCEDURE — 85025 COMPLETE CBC W/AUTO DIFF WBC: CPT | Performed by: STUDENT IN AN ORGANIZED HEALTH CARE EDUCATION/TRAINING PROGRAM

## 2023-01-01 PROCEDURE — 63710000001 PROMETHAZINE PER 12.5 MG: Performed by: STUDENT IN AN ORGANIZED HEALTH CARE EDUCATION/TRAINING PROGRAM

## 2023-01-01 PROCEDURE — 84145 PROCALCITONIN (PCT): CPT | Performed by: STUDENT IN AN ORGANIZED HEALTH CARE EDUCATION/TRAINING PROGRAM

## 2023-01-01 PROCEDURE — 25810000003 SODIUM CHLORIDE 0.9 % SOLUTION 250 ML FLEX CONT: Performed by: STUDENT IN AN ORGANIZED HEALTH CARE EDUCATION/TRAINING PROGRAM

## 2023-01-01 PROCEDURE — 36415 COLL VENOUS BLD VENIPUNCTURE: CPT

## 2023-01-01 PROCEDURE — 74270 X-RAY XM COLON 1CNTRST STD: CPT

## 2023-01-01 PROCEDURE — 86850 RBC ANTIBODY SCREEN: CPT | Performed by: STUDENT IN AN ORGANIZED HEALTH CARE EDUCATION/TRAINING PROGRAM

## 2023-01-01 PROCEDURE — 99232 SBSQ HOSP IP/OBS MODERATE 35: CPT | Performed by: SURGERY

## 2023-01-01 PROCEDURE — 84145 PROCALCITONIN (PCT): CPT | Performed by: HOSPITALIST

## 2023-01-01 PROCEDURE — 74270 X-RAY XM COLON 1CNTRST STD: CPT | Performed by: RADIOLOGY

## 2023-01-01 PROCEDURE — 25010000002 CEFTRIAXONE PER 250 MG: Performed by: HOSPITALIST

## 2023-01-01 PROCEDURE — 74176 CT ABD & PELVIS W/O CONTRAST: CPT

## 2023-01-01 PROCEDURE — 80074 ACUTE HEPATITIS PANEL: CPT | Performed by: HOSPITALIST

## 2023-01-01 PROCEDURE — 93306 TTE W/DOPPLER COMPLETE: CPT | Performed by: INTERNAL MEDICINE

## 2023-01-01 PROCEDURE — 94664 DEMO&/EVAL PT USE INHALER: CPT

## 2023-01-01 PROCEDURE — 85007 BL SMEAR W/DIFF WBC COUNT: CPT | Performed by: HOSPITALIST

## 2023-01-01 PROCEDURE — 97535 SELF CARE MNGMENT TRAINING: CPT

## 2023-01-01 PROCEDURE — 86901 BLOOD TYPING SEROLOGIC RH(D): CPT | Performed by: STUDENT IN AN ORGANIZED HEALTH CARE EDUCATION/TRAINING PROGRAM

## 2023-01-01 PROCEDURE — 74181 MRI ABDOMEN W/O CONTRAST: CPT | Performed by: RADIOLOGY

## 2023-01-01 PROCEDURE — 76705 ECHO EXAM OF ABDOMEN: CPT | Performed by: RADIOLOGY

## 2023-01-01 PROCEDURE — 82728 ASSAY OF FERRITIN: CPT | Performed by: STUDENT IN AN ORGANIZED HEALTH CARE EDUCATION/TRAINING PROGRAM

## 2023-01-01 PROCEDURE — 81001 URINALYSIS AUTO W/SCOPE: CPT | Performed by: STUDENT IN AN ORGANIZED HEALTH CARE EDUCATION/TRAINING PROGRAM

## 2023-01-01 PROCEDURE — 25010000002 PIPERACILLIN SOD-TAZOBACTAM PER 1 G: Performed by: STUDENT IN AN ORGANIZED HEALTH CARE EDUCATION/TRAINING PROGRAM

## 2023-01-01 PROCEDURE — 82948 REAGENT STRIP/BLOOD GLUCOSE: CPT

## 2023-01-01 PROCEDURE — 84484 ASSAY OF TROPONIN QUANT: CPT | Performed by: STUDENT IN AN ORGANIZED HEALTH CARE EDUCATION/TRAINING PROGRAM

## 2023-01-01 PROCEDURE — 76705 ECHO EXAM OF ABDOMEN: CPT

## 2023-01-01 PROCEDURE — 70498 CT ANGIOGRAPHY NECK: CPT

## 2023-01-01 PROCEDURE — 71045 X-RAY EXAM CHEST 1 VIEW: CPT

## 2023-01-01 PROCEDURE — 25010000002 MORPHINE PER 10 MG: Performed by: STUDENT IN AN ORGANIZED HEALTH CARE EDUCATION/TRAINING PROGRAM

## 2023-01-01 PROCEDURE — 36410 VNPNXR 3YR/> PHY/QHP DX/THER: CPT

## 2023-01-01 PROCEDURE — 25010000002 LINEZOLID 600 MG/300ML SOLUTION: Performed by: HOSPITALIST

## 2023-01-01 PROCEDURE — 74176 CT ABD & PELVIS W/O CONTRAST: CPT | Performed by: RADIOLOGY

## 2023-01-01 PROCEDURE — 80048 BASIC METABOLIC PNL TOTAL CA: CPT | Performed by: STUDENT IN AN ORGANIZED HEALTH CARE EDUCATION/TRAINING PROGRAM

## 2023-01-01 PROCEDURE — 85730 THROMBOPLASTIN TIME PARTIAL: CPT | Performed by: STUDENT IN AN ORGANIZED HEALTH CARE EDUCATION/TRAINING PROGRAM

## 2023-01-01 PROCEDURE — 83735 ASSAY OF MAGNESIUM: CPT | Performed by: STUDENT IN AN ORGANIZED HEALTH CARE EDUCATION/TRAINING PROGRAM

## 2023-01-01 PROCEDURE — 99222 1ST HOSP IP/OBS MODERATE 55: CPT | Performed by: PSYCHIATRY & NEUROLOGY

## 2023-01-01 PROCEDURE — 36600 WITHDRAWAL OF ARTERIAL BLOOD: CPT

## 2023-01-01 PROCEDURE — 99221 1ST HOSP IP/OBS SF/LOW 40: CPT | Performed by: SURGERY

## 2023-01-01 PROCEDURE — 25810000003 SODIUM CHLORIDE 0.9 % SOLUTION: Performed by: HOSPITALIST

## 2023-01-01 PROCEDURE — 80162 ASSAY OF DIGOXIN TOTAL: CPT

## 2023-01-01 PROCEDURE — 86900 BLOOD TYPING SEROLOGIC ABO: CPT

## 2023-01-01 PROCEDURE — 70496 CT ANGIOGRAPHY HEAD: CPT

## 2023-01-01 PROCEDURE — 86140 C-REACTIVE PROTEIN: CPT | Performed by: HOSPITALIST

## 2023-01-01 PROCEDURE — 25510000001 IOPAMIDOL PER 1 ML: Performed by: STUDENT IN AN ORGANIZED HEALTH CARE EDUCATION/TRAINING PROGRAM

## 2023-01-01 PROCEDURE — 86140 C-REACTIVE PROTEIN: CPT | Performed by: STUDENT IN AN ORGANIZED HEALTH CARE EDUCATION/TRAINING PROGRAM

## 2023-01-01 PROCEDURE — 80053 COMPREHEN METABOLIC PANEL: CPT | Performed by: HOSPITALIST

## 2023-01-01 PROCEDURE — 99233 SBSQ HOSP IP/OBS HIGH 50: CPT | Performed by: STUDENT IN AN ORGANIZED HEALTH CARE EDUCATION/TRAINING PROGRAM

## 2023-01-01 PROCEDURE — 82248 BILIRUBIN DIRECT: CPT | Performed by: STUDENT IN AN ORGANIZED HEALTH CARE EDUCATION/TRAINING PROGRAM

## 2023-01-01 PROCEDURE — 80143 DRUG ASSAY ACETAMINOPHEN: CPT

## 2023-01-01 PROCEDURE — 94640 AIRWAY INHALATION TREATMENT: CPT

## 2023-01-01 PROCEDURE — 85014 HEMATOCRIT: CPT | Performed by: INTERNAL MEDICINE

## 2023-01-01 PROCEDURE — 82565 ASSAY OF CREATININE: CPT

## 2023-01-01 PROCEDURE — 0042T CT CEREBRAL PERFUSION W WO CONTRAST: CPT | Performed by: RADIOLOGY

## 2023-01-01 PROCEDURE — 93005 ELECTROCARDIOGRAM TRACING: CPT | Performed by: STUDENT IN AN ORGANIZED HEALTH CARE EDUCATION/TRAINING PROGRAM

## 2023-01-01 PROCEDURE — 25010000002 PHYTONADIONE 10 MG/ML SOLUTION 1 ML AMPULE: Performed by: STUDENT IN AN ORGANIZED HEALTH CARE EDUCATION/TRAINING PROGRAM

## 2023-01-01 PROCEDURE — 85025 COMPLETE CBC W/AUTO DIFF WBC: CPT | Performed by: HOSPITALIST

## 2023-01-01 PROCEDURE — 0042T HC CT CEREBRAL PERFUSION W/WO CONTRAST: CPT

## 2023-01-01 PROCEDURE — 70496 CT ANGIOGRAPHY HEAD: CPT | Performed by: RADIOLOGY

## 2023-01-01 PROCEDURE — 93306 TTE W/DOPPLER COMPLETE: CPT

## 2023-01-01 PROCEDURE — 99223 1ST HOSP IP/OBS HIGH 75: CPT

## 2023-01-01 PROCEDURE — 86900 BLOOD TYPING SEROLOGIC ABO: CPT | Performed by: STUDENT IN AN ORGANIZED HEALTH CARE EDUCATION/TRAINING PROGRAM

## 2023-01-01 PROCEDURE — 82375 ASSAY CARBOXYHB QUANT: CPT

## 2023-01-01 PROCEDURE — 85018 HEMOGLOBIN: CPT | Performed by: INTERNAL MEDICINE

## 2023-01-01 PROCEDURE — 82805 BLOOD GASES W/O2 SATURATION: CPT

## 2023-01-01 PROCEDURE — 80076 HEPATIC FUNCTION PANEL: CPT | Performed by: STUDENT IN AN ORGANIZED HEALTH CARE EDUCATION/TRAINING PROGRAM

## 2023-01-01 PROCEDURE — 71250 CT THORAX DX C-: CPT | Performed by: RADIOLOGY

## 2023-01-01 PROCEDURE — 71250 CT THORAX DX C-: CPT

## 2023-01-01 PROCEDURE — 25010000002 POTASSIUM CHLORIDE 10 MEQ/100ML SOLUTION: Performed by: STUDENT IN AN ORGANIZED HEALTH CARE EDUCATION/TRAINING PROGRAM

## 2023-01-01 PROCEDURE — 97162 PT EVAL MOD COMPLEX 30 MIN: CPT

## 2023-01-01 PROCEDURE — 92610 EVALUATE SWALLOWING FUNCTION: CPT

## 2023-01-01 PROCEDURE — 93010 ELECTROCARDIOGRAM REPORT: CPT | Performed by: INTERNAL MEDICINE

## 2023-01-01 PROCEDURE — 70551 MRI BRAIN STEM W/O DYE: CPT

## 2023-01-01 PROCEDURE — 71045 X-RAY EXAM CHEST 1 VIEW: CPT | Performed by: RADIOLOGY

## 2023-01-01 PROCEDURE — 82247 BILIRUBIN TOTAL: CPT | Performed by: STUDENT IN AN ORGANIZED HEALTH CARE EDUCATION/TRAINING PROGRAM

## 2023-01-01 PROCEDURE — 83050 HGB METHEMOGLOBIN QUAN: CPT

## 2023-01-01 PROCEDURE — 86901 BLOOD TYPING SEROLOGIC RH(D): CPT

## 2023-01-01 PROCEDURE — 83540 ASSAY OF IRON: CPT | Performed by: STUDENT IN AN ORGANIZED HEALTH CARE EDUCATION/TRAINING PROGRAM

## 2023-01-01 PROCEDURE — 70450 CT HEAD/BRAIN W/O DYE: CPT

## 2023-01-01 PROCEDURE — 25010000002 CEFTRIAXONE PER 250 MG: Performed by: STUDENT IN AN ORGANIZED HEALTH CARE EDUCATION/TRAINING PROGRAM

## 2023-01-01 PROCEDURE — C1751 CATH, INF, PER/CENT/MIDLINE: HCPCS

## 2023-01-01 PROCEDURE — 87040 BLOOD CULTURE FOR BACTERIA: CPT | Performed by: STUDENT IN AN ORGANIZED HEALTH CARE EDUCATION/TRAINING PROGRAM

## 2023-01-01 PROCEDURE — 99285 EMERGENCY DEPT VISIT HI MDM: CPT

## 2023-01-01 PROCEDURE — 85652 RBC SED RATE AUTOMATED: CPT | Performed by: STUDENT IN AN ORGANIZED HEALTH CARE EDUCATION/TRAINING PROGRAM

## 2023-01-01 PROCEDURE — 82550 ASSAY OF CK (CPK): CPT | Performed by: STUDENT IN AN ORGANIZED HEALTH CARE EDUCATION/TRAINING PROGRAM

## 2023-01-01 PROCEDURE — 70551 MRI BRAIN STEM W/O DYE: CPT | Performed by: RADIOLOGY

## 2023-01-01 PROCEDURE — 97166 OT EVAL MOD COMPLEX 45 MIN: CPT

## 2023-01-01 PROCEDURE — 85045 AUTOMATED RETICULOCYTE COUNT: CPT | Performed by: STUDENT IN AN ORGANIZED HEALTH CARE EDUCATION/TRAINING PROGRAM

## 2023-01-01 RX ORDER — POLYETHYLENE GLYCOL 3350 17 G/17G
17 POWDER, FOR SOLUTION ORAL DAILY PRN
Status: DISCONTINUED | OUTPATIENT
Start: 2023-01-01 | End: 2023-01-01

## 2023-01-01 RX ORDER — MIDODRINE HYDROCHLORIDE 2.5 MG/1
2.5 TABLET ORAL EVERY 8 HOURS PRN
Status: DISCONTINUED | OUTPATIENT
Start: 2023-01-01 | End: 2023-01-01

## 2023-01-01 RX ORDER — METOPROLOL TARTRATE 50 MG/1
50 TABLET, FILM COATED ORAL EVERY 12 HOURS SCHEDULED
Status: DISCONTINUED | OUTPATIENT
Start: 2023-01-01 | End: 2023-01-01

## 2023-01-01 RX ORDER — CIPROFLOXACIN 500 MG/1
500 TABLET, FILM COATED ORAL 2 TIMES DAILY
Qty: 8 TABLET | Refills: 0 | OUTPATIENT
Start: 2023-01-01 | End: 2024-01-02

## 2023-01-01 RX ORDER — FUROSEMIDE 40 MG/1
40 TABLET ORAL DAILY
COMMUNITY

## 2023-01-01 RX ORDER — LINEZOLID 2 MG/ML
600 INJECTION, SOLUTION INTRAVENOUS EVERY 12 HOURS
Status: DISCONTINUED | OUTPATIENT
Start: 2023-01-01 | End: 2023-01-01

## 2023-01-01 RX ORDER — AMOXICILLIN 250 MG
1 CAPSULE ORAL 2 TIMES DAILY
Qty: 60 TABLET | Refills: 0 | OUTPATIENT
Start: 2023-01-01 | End: 2024-01-28

## 2023-01-01 RX ORDER — ONDANSETRON 2 MG/ML
4 INJECTION INTRAMUSCULAR; INTRAVENOUS EVERY 6 HOURS PRN
Status: DISCONTINUED | OUTPATIENT
Start: 2023-01-01 | End: 2023-01-01

## 2023-01-01 RX ORDER — SODIUM CHLORIDE 9 MG/ML
40 INJECTION, SOLUTION INTRAVENOUS AS NEEDED
Status: DISCONTINUED | OUTPATIENT
Start: 2023-01-01 | End: 2023-12-31 | Stop reason: HOSPADM

## 2023-01-01 RX ORDER — SODIUM CHLORIDE 9 MG/ML
40 INJECTION, SOLUTION INTRAVENOUS AS NEEDED
Status: DISCONTINUED | OUTPATIENT
Start: 2023-01-01 | End: 2023-01-01

## 2023-01-01 RX ORDER — IPRATROPIUM BROMIDE AND ALBUTEROL SULFATE 2.5; .5 MG/3ML; MG/3ML
3 SOLUTION RESPIRATORY (INHALATION) EVERY 6 HOURS PRN
Status: CANCELLED | OUTPATIENT
Start: 2023-01-01

## 2023-01-01 RX ORDER — CEFDINIR 300 MG/1
300 CAPSULE ORAL 2 TIMES DAILY
COMMUNITY

## 2023-01-01 RX ORDER — MIDAZOLAM HYDROCHLORIDE 1 MG/ML
1 INJECTION INTRAMUSCULAR; INTRAVENOUS
Status: DISCONTINUED | OUTPATIENT
Start: 2023-01-01 | End: 2023-12-31 | Stop reason: HOSPADM

## 2023-01-01 RX ORDER — PROMETHAZINE HYDROCHLORIDE 12.5 MG/1
6.25 TABLET ORAL 2 TIMES DAILY
Status: DISCONTINUED | OUTPATIENT
Start: 2023-01-01 | End: 2023-01-01

## 2023-01-01 RX ORDER — NITROGLYCERIN 0.4 MG/1
0.4 TABLET SUBLINGUAL
Status: DISCONTINUED | OUTPATIENT
Start: 2023-01-01 | End: 2023-01-01

## 2023-01-01 RX ORDER — METOPROLOL TARTRATE 50 MG/1
50 TABLET, FILM COATED ORAL EVERY 12 HOURS
COMMUNITY

## 2023-01-01 RX ORDER — DIGOXIN 125 MCG
125 TABLET ORAL
Status: DISCONTINUED | OUTPATIENT
Start: 2023-01-01 | End: 2023-01-01

## 2023-01-01 RX ORDER — GLYCOPYRROLATE 0.2 MG/ML
0.4 INJECTION INTRAMUSCULAR; INTRAVENOUS ONCE
Status: COMPLETED | OUTPATIENT
Start: 2023-01-01 | End: 2023-01-01

## 2023-01-01 RX ORDER — SODIUM CHLORIDE 0.9 % (FLUSH) 0.9 %
10 SYRINGE (ML) INJECTION AS NEEDED
Status: DISCONTINUED | OUTPATIENT
Start: 2023-01-01 | End: 2023-01-01

## 2023-01-01 RX ORDER — MIRTAZAPINE 15 MG/1
15 TABLET, FILM COATED ORAL NIGHTLY
Status: CANCELLED | OUTPATIENT
Start: 2023-01-01

## 2023-01-01 RX ORDER — BISACODYL 10 MG
10 SUPPOSITORY, RECTAL RECTAL DAILY PRN
Status: DISCONTINUED | OUTPATIENT
Start: 2023-01-01 | End: 2023-01-01

## 2023-01-01 RX ORDER — MORPHINE SULFATE 2 MG/ML
2 INJECTION, SOLUTION INTRAMUSCULAR; INTRAVENOUS
Status: DISCONTINUED | OUTPATIENT
Start: 2023-01-01 | End: 2023-12-31 | Stop reason: HOSPADM

## 2023-01-01 RX ORDER — CEPHALEXIN 500 MG/1
500 CAPSULE ORAL 2 TIMES DAILY
Qty: 13 CAPSULE | Refills: 0 | Status: SHIPPED | OUTPATIENT
Start: 2023-01-01

## 2023-01-01 RX ORDER — DOXYCYCLINE HYCLATE 100 MG/1
100 CAPSULE ORAL 2 TIMES DAILY
Qty: 6 CAPSULE | Refills: 0 | OUTPATIENT
Start: 2023-01-01 | End: 2024-01-01

## 2023-01-01 RX ORDER — BISACODYL 5 MG/1
5 TABLET, DELAYED RELEASE ORAL DAILY PRN
Status: DISCONTINUED | OUTPATIENT
Start: 2023-01-01 | End: 2023-01-01

## 2023-01-01 RX ORDER — SODIUM CHLORIDE 0.9 % (FLUSH) 0.9 %
10 SYRINGE (ML) INJECTION EVERY 12 HOURS SCHEDULED
Status: DISCONTINUED | OUTPATIENT
Start: 2023-01-01 | End: 2023-01-01

## 2023-01-01 RX ORDER — LACTULOSE 10 G/15ML
300 SOLUTION ORAL ONCE
Qty: 300 ML | Refills: 0 | Status: COMPLETED | OUTPATIENT
Start: 2023-01-01 | End: 2023-01-01

## 2023-01-01 RX ORDER — MIDODRINE HYDROCHLORIDE 2.5 MG/1
2.5 TABLET ORAL EVERY 8 HOURS PRN
Qty: 60 TABLET | Refills: 0 | OUTPATIENT
Start: 2023-01-01

## 2023-01-01 RX ORDER — IPRATROPIUM BROMIDE AND ALBUTEROL SULFATE 2.5; .5 MG/3ML; MG/3ML
3 SOLUTION RESPIRATORY (INHALATION) EVERY 6 HOURS PRN
COMMUNITY

## 2023-01-01 RX ORDER — AMOXICILLIN 250 MG
2 CAPSULE ORAL 2 TIMES DAILY
Status: DISCONTINUED | OUTPATIENT
Start: 2023-01-01 | End: 2023-01-01

## 2023-01-01 RX ORDER — ASPIRIN 81 MG/1
81 TABLET ORAL DAILY
Status: DISCONTINUED | OUTPATIENT
Start: 2023-01-01 | End: 2023-01-01

## 2023-01-01 RX ORDER — GLYCOPYRROLATE 0.2 MG/ML
0.3 INJECTION INTRAMUSCULAR; INTRAVENOUS
Status: DISCONTINUED | OUTPATIENT
Start: 2023-01-01 | End: 2023-12-31 | Stop reason: HOSPADM

## 2023-01-01 RX ORDER — ONDANSETRON 4 MG/1
4 TABLET, FILM COATED ORAL 2 TIMES DAILY
Qty: 20 TABLET | Refills: 0 | OUTPATIENT
Start: 2023-01-01

## 2023-01-01 RX ORDER — ASPIRIN 81 MG/1
81 TABLET ORAL DAILY
COMMUNITY

## 2023-01-01 RX ORDER — DIGOXIN 125 MCG
125 TABLET ORAL 3 TIMES WEEKLY
COMMUNITY

## 2023-01-01 RX ORDER — SODIUM CHLORIDE 9 MG/ML
50 INJECTION, SOLUTION INTRAVENOUS CONTINUOUS
Status: DISCONTINUED | OUTPATIENT
Start: 2023-01-01 | End: 2023-01-01

## 2023-01-01 RX ORDER — MIRTAZAPINE 15 MG/1
15 TABLET, FILM COATED ORAL NIGHTLY
COMMUNITY

## 2023-01-01 RX ORDER — CEFDINIR 300 MG/1
300 CAPSULE ORAL 2 TIMES DAILY
Status: CANCELLED | OUTPATIENT
Start: 2023-01-01 | End: 2023-01-01

## 2023-01-01 RX ORDER — IPRATROPIUM BROMIDE AND ALBUTEROL SULFATE 2.5; .5 MG/3ML; MG/3ML
3 SOLUTION RESPIRATORY (INHALATION)
Status: DISCONTINUED | OUTPATIENT
Start: 2023-01-01 | End: 2023-01-01

## 2023-01-01 RX ORDER — SODIUM CHLORIDE 0.9 % (FLUSH) 0.9 %
10 SYRINGE (ML) INJECTION AS NEEDED
Status: DISCONTINUED | OUTPATIENT
Start: 2023-01-01 | End: 2023-12-31 | Stop reason: HOSPADM

## 2023-01-01 RX ORDER — POTASSIUM CHLORIDE 7.45 MG/ML
10 INJECTION INTRAVENOUS
Status: COMPLETED | OUTPATIENT
Start: 2023-01-01 | End: 2023-01-01

## 2023-01-01 RX ORDER — POLYETHYLENE GLYCOL 3350 17 G/17G
17 POWDER, FOR SOLUTION ORAL DAILY PRN
Qty: 30 EACH | Refills: 0 | OUTPATIENT
Start: 2023-01-01

## 2023-01-01 RX ORDER — FUROSEMIDE 40 MG/1
40 TABLET ORAL DAILY
Status: CANCELLED | OUTPATIENT
Start: 2023-01-01

## 2023-01-01 RX ADMIN — APIXABAN 5 MG: 5 TABLET, FILM COATED ORAL at 21:03

## 2023-01-01 RX ADMIN — POTASSIUM CHLORIDE 10 MEQ: 7.46 INJECTION, SOLUTION INTRAVENOUS at 19:30

## 2023-01-01 RX ADMIN — Medication 10 ML: at 20:13

## 2023-01-01 RX ADMIN — SODIUM CHLORIDE 40 ML: 9 INJECTION, SOLUTION INTRAVENOUS at 21:10

## 2023-01-01 RX ADMIN — METOPROLOL TARTRATE 50 MG: 50 TABLET, FILM COATED ORAL at 12:45

## 2023-01-01 RX ADMIN — APIXABAN 5 MG: 5 TABLET, FILM COATED ORAL at 20:35

## 2023-01-01 RX ADMIN — MICONAZOLE NITRATE 1 APPLICATION: 2 POWDER TOPICAL at 08:32

## 2023-01-01 RX ADMIN — DOXYCYCLINE 100 MG: 100 INJECTION, POWDER, LYOPHILIZED, FOR SOLUTION INTRAVENOUS at 13:59

## 2023-01-01 RX ADMIN — PIPERACILLIN AND TAZOBACTAM 4.5 G: 4; .5 INJECTION, POWDER, FOR SOLUTION INTRAVENOUS; PARENTERAL at 12:09

## 2023-01-01 RX ADMIN — MICONAZOLE NITRATE 1 APPLICATION: 2 POWDER TOPICAL at 08:46

## 2023-01-01 RX ADMIN — IPRATROPIUM BROMIDE 0.5 MG: 0.5 SOLUTION RESPIRATORY (INHALATION) at 06:50

## 2023-01-01 RX ADMIN — ASPIRIN 81 MG: 81 TABLET, COATED ORAL at 10:37

## 2023-01-01 RX ADMIN — LINEZOLID 600 MG: 600 INJECTION, SOLUTION INTRAVENOUS at 01:17

## 2023-01-01 RX ADMIN — Medication 10 ML: at 20:36

## 2023-01-01 RX ADMIN — ASPIRIN 81 MG: 81 TABLET, COATED ORAL at 09:16

## 2023-01-01 RX ADMIN — CEFTRIAXONE 1000 MG: 1 INJECTION, POWDER, FOR SOLUTION INTRAMUSCULAR; INTRAVENOUS at 16:46

## 2023-01-01 RX ADMIN — Medication 10 ML: at 02:48

## 2023-01-01 RX ADMIN — MUPIROCIN 1 APPLICATION: 20 OINTMENT TOPICAL at 22:17

## 2023-01-01 RX ADMIN — LINEZOLID 600 MG: 600 INJECTION, SOLUTION INTRAVENOUS at 14:56

## 2023-01-01 RX ADMIN — DOCUSATE SODIUM 50 MG AND SENNOSIDES 8.6 MG 2 TABLET: 8.6; 5 TABLET, FILM COATED ORAL at 08:32

## 2023-01-01 RX ADMIN — PROMETHAZINE HYDROCHLORIDE 6.25 MG: 12.5 TABLET ORAL at 08:32

## 2023-01-01 RX ADMIN — MORPHINE SULFATE 2 MG: 2 INJECTION, SOLUTION INTRAMUSCULAR; INTRAVENOUS at 09:55

## 2023-01-01 RX ADMIN — Medication 10 ML: at 21:10

## 2023-01-01 RX ADMIN — METOPROLOL TARTRATE 50 MG: 50 TABLET, FILM COATED ORAL at 21:03

## 2023-01-01 RX ADMIN — PIPERACILLIN SODIUM AND TAZOBACTAM SODIUM 4.5 G: 4; .5 INJECTION, POWDER, LYOPHILIZED, FOR SOLUTION INTRAVENOUS at 15:04

## 2023-01-01 RX ADMIN — ASPIRIN 81 MG: 81 TABLET, COATED ORAL at 08:32

## 2023-01-01 RX ADMIN — PIPERACILLIN AND TAZOBACTAM 4.5 G: 4; .5 INJECTION, POWDER, FOR SOLUTION INTRAVENOUS; PARENTERAL at 20:30

## 2023-01-01 RX ADMIN — METOPROLOL TARTRATE 50 MG: 50 TABLET, FILM COATED ORAL at 20:35

## 2023-01-01 RX ADMIN — APIXABAN 5 MG: 5 TABLET, FILM COATED ORAL at 10:37

## 2023-01-01 RX ADMIN — IPRATROPIUM BROMIDE AND ALBUTEROL SULFATE 3 ML: 2.5; .5 SOLUTION RESPIRATORY (INHALATION) at 13:48

## 2023-01-01 RX ADMIN — MUPIROCIN 1 APPLICATION: 20 OINTMENT TOPICAL at 09:40

## 2023-01-01 RX ADMIN — Medication 10 ML: at 08:54

## 2023-01-01 RX ADMIN — APIXABAN 5 MG: 5 TABLET, FILM COATED ORAL at 22:21

## 2023-01-01 RX ADMIN — IPRATROPIUM BROMIDE 0.5 MG: 0.5 SOLUTION RESPIRATORY (INHALATION) at 19:07

## 2023-01-01 RX ADMIN — PIPERACILLIN AND TAZOBACTAM 4.5 G: 4; .5 INJECTION, POWDER, FOR SOLUTION INTRAVENOUS; PARENTERAL at 04:50

## 2023-01-01 RX ADMIN — Medication 10 ML: at 08:46

## 2023-01-01 RX ADMIN — SODIUM CHLORIDE 75 ML/HR: 9 INJECTION, SOLUTION INTRAVENOUS at 02:48

## 2023-01-01 RX ADMIN — DOXYCYCLINE 100 MG: 100 INJECTION, POWDER, LYOPHILIZED, FOR SOLUTION INTRAVENOUS at 16:00

## 2023-01-01 RX ADMIN — IPRATROPIUM BROMIDE AND ALBUTEROL SULFATE 3 ML: 2.5; .5 SOLUTION RESPIRATORY (INHALATION) at 07:11

## 2023-01-01 RX ADMIN — MICONAZOLE NITRATE 1 APPLICATION: 2 POWDER TOPICAL at 21:04

## 2023-01-01 RX ADMIN — PIPERACILLIN AND TAZOBACTAM 4.5 G: 4; .5 INJECTION, POWDER, FOR SOLUTION INTRAVENOUS; PARENTERAL at 21:09

## 2023-01-01 RX ADMIN — Medication 10 ML: at 20:48

## 2023-01-01 RX ADMIN — PIPERACILLIN AND TAZOBACTAM 4.5 G: 4; .5 INJECTION, POWDER, FOR SOLUTION INTRAVENOUS; PARENTERAL at 11:49

## 2023-01-01 RX ADMIN — Medication 10 ML: at 10:38

## 2023-01-01 RX ADMIN — DOXYCYCLINE 100 MG: 100 INJECTION, POWDER, LYOPHILIZED, FOR SOLUTION INTRAVENOUS at 01:43

## 2023-01-01 RX ADMIN — Medication 10 ML: at 21:03

## 2023-01-01 RX ADMIN — MICONAZOLE NITRATE 1 APPLICATION: 2 POWDER TOPICAL at 21:09

## 2023-01-01 RX ADMIN — SODIUM CHLORIDE 50 ML/HR: 9 INJECTION, SOLUTION INTRAVENOUS at 08:53

## 2023-01-01 RX ADMIN — PROMETHAZINE HYDROCHLORIDE 6.25 MG: 12.5 TABLET ORAL at 20:35

## 2023-01-01 RX ADMIN — SODIUM CHLORIDE 1000 ML: 9 INJECTION, SOLUTION INTRAVENOUS at 15:55

## 2023-01-01 RX ADMIN — SODIUM CHLORIDE 50 ML/HR: 9 INJECTION, SOLUTION INTRAVENOUS at 18:34

## 2023-01-01 RX ADMIN — DIGOXIN 125 MCG: 0.12 TABLET ORAL at 09:16

## 2023-01-01 RX ADMIN — METOPROLOL TARTRATE 50 MG: 50 TABLET, FILM COATED ORAL at 20:52

## 2023-01-01 RX ADMIN — LINEZOLID 600 MG: 600 INJECTION, SOLUTION INTRAVENOUS at 02:42

## 2023-01-01 RX ADMIN — IOPAMIDOL 140 ML: 755 INJECTION, SOLUTION INTRAVENOUS at 13:01

## 2023-01-01 RX ADMIN — PIPERACILLIN AND TAZOBACTAM 4.5 G: 4; .5 INJECTION, POWDER, FOR SOLUTION INTRAVENOUS; PARENTERAL at 03:59

## 2023-01-01 RX ADMIN — CEFTRIAXONE 1000 MG: 1 INJECTION, POWDER, FOR SOLUTION INTRAMUSCULAR; INTRAVENOUS at 15:09

## 2023-01-01 RX ADMIN — PROMETHAZINE HYDROCHLORIDE 6.25 MG: 12.5 TABLET ORAL at 22:21

## 2023-01-01 RX ADMIN — POTASSIUM CHLORIDE 10 MEQ: 7.46 INJECTION, SOLUTION INTRAVENOUS at 17:29

## 2023-01-01 RX ADMIN — APIXABAN 5 MG: 5 TABLET, FILM COATED ORAL at 08:32

## 2023-01-01 RX ADMIN — LINEZOLID 600 MG: 600 INJECTION, SOLUTION INTRAVENOUS at 12:45

## 2023-01-01 RX ADMIN — MICONAZOLE NITRATE 1 APPLICATION: 2 POWDER TOPICAL at 09:56

## 2023-01-01 RX ADMIN — GLYCOPYRROLATE 0.4 MG: 0.2 INJECTION INTRAMUSCULAR; INTRAVENOUS at 11:41

## 2023-01-01 RX ADMIN — DIGOXIN 125 MCG: 0.12 TABLET ORAL at 10:37

## 2023-01-01 RX ADMIN — DOXYCYCLINE 100 MG: 100 INJECTION, POWDER, LYOPHILIZED, FOR SOLUTION INTRAVENOUS at 16:25

## 2023-01-01 RX ADMIN — MICONAZOLE NITRATE 1 APPLICATION: 2 POWDER TOPICAL at 08:15

## 2023-01-01 RX ADMIN — PIPERACILLIN AND TAZOBACTAM 4.5 G: 4; .5 INJECTION, POWDER, FOR SOLUTION INTRAVENOUS; PARENTERAL at 11:39

## 2023-01-01 RX ADMIN — SODIUM CHLORIDE 75 ML/HR: 9 INJECTION, SOLUTION INTRAVENOUS at 10:17

## 2023-01-01 RX ADMIN — MICONAZOLE NITRATE 1 APPLICATION: 2 POWDER TOPICAL at 20:13

## 2023-01-01 RX ADMIN — PROMETHAZINE HYDROCHLORIDE 6.25 MG: 12.5 TABLET ORAL at 20:50

## 2023-01-01 RX ADMIN — Medication 10 ML: at 09:40

## 2023-01-01 RX ADMIN — IPRATROPIUM BROMIDE 0.5 MG: 0.5 SOLUTION RESPIRATORY (INHALATION) at 06:58

## 2023-01-01 RX ADMIN — MICONAZOLE NITRATE 1 APPLICATION: 2 POWDER TOPICAL at 20:48

## 2023-01-01 RX ADMIN — CEFTRIAXONE 1000 MG: 1 INJECTION, POWDER, FOR SOLUTION INTRAMUSCULAR; INTRAVENOUS at 14:57

## 2023-01-01 RX ADMIN — POTASSIUM CHLORIDE 10 MEQ: 7.46 INJECTION, SOLUTION INTRAVENOUS at 16:20

## 2023-01-01 RX ADMIN — MICONAZOLE NITRATE 1 APPLICATION: 2 POWDER TOPICAL at 09:24

## 2023-01-01 RX ADMIN — LACTULOSE 300 ML: 10 SOLUTION ORAL at 09:32

## 2023-01-01 RX ADMIN — PHYTONADIONE 10 MG: 10 INJECTION, EMULSION INTRAMUSCULAR; INTRAVENOUS; SUBCUTANEOUS at 17:15

## 2023-01-01 RX ADMIN — IPRATROPIUM BROMIDE 0.5 MG: 0.5 SOLUTION RESPIRATORY (INHALATION) at 06:12

## 2023-01-01 RX ADMIN — MICONAZOLE NITRATE 1 APPLICATION: 2 POWDER TOPICAL at 08:54

## 2023-01-01 RX ADMIN — APIXABAN 5 MG: 5 TABLET, FILM COATED ORAL at 20:18

## 2023-01-01 RX ADMIN — IPRATROPIUM BROMIDE 0.5 MG: 0.5 SOLUTION RESPIRATORY (INHALATION) at 13:20

## 2023-01-01 RX ADMIN — IPRATROPIUM BROMIDE AND ALBUTEROL SULFATE 3 ML: 2.5; .5 SOLUTION RESPIRATORY (INHALATION) at 00:13

## 2023-01-01 RX ADMIN — SODIUM CHLORIDE 1000 ML: 9 INJECTION, SOLUTION INTRAVENOUS at 12:20

## 2023-01-01 RX ADMIN — METOPROLOL TARTRATE 50 MG: 50 TABLET, FILM COATED ORAL at 20:48

## 2023-01-01 RX ADMIN — APIXABAN 5 MG: 5 TABLET, FILM COATED ORAL at 09:16

## 2023-01-01 RX ADMIN — SODIUM BICARBONATE 150 MEQ: 84 INJECTION, SOLUTION INTRAVENOUS at 16:25

## 2023-01-01 RX ADMIN — POTASSIUM CHLORIDE 10 MEQ: 7.46 INJECTION, SOLUTION INTRAVENOUS at 18:33

## 2023-01-01 RX ADMIN — Medication 10 ML: at 09:24

## 2023-01-01 RX ADMIN — IPRATROPIUM BROMIDE 0.5 MG: 0.5 SOLUTION RESPIRATORY (INHALATION) at 00:28

## 2023-01-01 RX ADMIN — LINEZOLID 600 MG: 600 INJECTION, SOLUTION INTRAVENOUS at 00:14

## 2023-01-01 RX ADMIN — IPRATROPIUM BROMIDE 0.5 MG: 0.5 SOLUTION RESPIRATORY (INHALATION) at 19:00

## 2023-01-01 RX ADMIN — DOXYCYCLINE 100 MG: 100 INJECTION, POWDER, LYOPHILIZED, FOR SOLUTION INTRAVENOUS at 16:04

## 2023-01-01 RX ADMIN — MICONAZOLE NITRATE 1 APPLICATION: 2 POWDER TOPICAL at 20:36

## 2023-01-01 RX ADMIN — MORPHINE SULFATE 2 MG: 2 INJECTION, SOLUTION INTRAMUSCULAR; INTRAVENOUS at 16:29

## 2023-01-01 RX ADMIN — APIXABAN 5 MG: 5 TABLET, FILM COATED ORAL at 20:52

## 2023-01-01 RX ADMIN — LINEZOLID 600 MG: 600 INJECTION, SOLUTION INTRAVENOUS at 01:25

## 2023-01-01 RX ADMIN — MICONAZOLE NITRATE 1 APPLICATION: 2 POWDER TOPICAL at 20:54

## 2023-01-01 RX ADMIN — DOCUSATE SODIUM 50 MG AND SENNOSIDES 8.6 MG 2 TABLET: 8.6; 5 TABLET, FILM COATED ORAL at 20:53

## 2023-01-01 RX ADMIN — MICONAZOLE NITRATE 1 APPLICATION: 2 POWDER TOPICAL at 10:39

## 2023-01-01 RX ADMIN — DOXYCYCLINE 100 MG: 100 INJECTION, POWDER, LYOPHILIZED, FOR SOLUTION INTRAVENOUS at 02:12

## 2023-01-01 RX ADMIN — CEFTRIAXONE 1000 MG: 1 INJECTION, POWDER, FOR SOLUTION INTRAMUSCULAR; INTRAVENOUS at 16:22

## 2023-01-01 RX ADMIN — SODIUM CHLORIDE 1000 ML: 9 INJECTION, SOLUTION INTRAVENOUS at 08:01

## 2023-01-01 RX ADMIN — SODIUM CHLORIDE 75 ML/HR: 9 INJECTION, SOLUTION INTRAVENOUS at 16:46

## 2023-01-01 RX ADMIN — LINEZOLID 600 MG: 600 INJECTION, SOLUTION INTRAVENOUS at 12:46

## 2023-01-01 RX ADMIN — Medication 10 ML: at 20:54

## 2023-01-01 RX ADMIN — SODIUM CHLORIDE 500 ML: 9 INJECTION, SOLUTION INTRAVENOUS at 17:56

## 2023-01-01 RX ADMIN — PIPERACILLIN AND TAZOBACTAM 4.5 G: 4; .5 INJECTION, POWDER, FOR SOLUTION INTRAVENOUS; PARENTERAL at 04:30

## 2023-01-01 RX ADMIN — METOPROLOL TARTRATE 50 MG: 50 TABLET, FILM COATED ORAL at 08:32

## 2023-01-01 RX ADMIN — MICONAZOLE NITRATE 1 APPLICATION: 2 POWDER TOPICAL at 20:19

## 2023-01-01 RX ADMIN — PIPERACILLIN AND TAZOBACTAM 4.5 G: 4; .5 INJECTION, POWDER, FOR SOLUTION INTRAVENOUS; PARENTERAL at 20:17

## 2023-01-01 RX ADMIN — DOXYCYCLINE 100 MG: 100 INJECTION, POWDER, LYOPHILIZED, FOR SOLUTION INTRAVENOUS at 01:36

## 2023-01-01 RX ADMIN — Medication 10 ML: at 20:18

## 2023-01-01 RX ADMIN — APIXABAN 5 MG: 5 TABLET, FILM COATED ORAL at 20:13

## 2023-01-01 RX ADMIN — IPRATROPIUM BROMIDE 0.5 MG: 0.5 SOLUTION RESPIRATORY (INHALATION) at 01:26

## 2023-01-01 RX ADMIN — PIPERACILLIN AND TAZOBACTAM 4.5 G: 4; .5 INJECTION, POWDER, FOR SOLUTION INTRAVENOUS; PARENTERAL at 20:19

## 2023-01-01 RX ADMIN — IPRATROPIUM BROMIDE AND ALBUTEROL SULFATE 3 ML: 2.5; .5 SOLUTION RESPIRATORY (INHALATION) at 19:48

## 2023-01-01 RX ADMIN — IPRATROPIUM BROMIDE AND ALBUTEROL SULFATE 3 ML: 2.5; .5 SOLUTION RESPIRATORY (INHALATION) at 00:11

## 2023-01-01 RX ADMIN — PIPERACILLIN AND TAZOBACTAM 4.5 G: 4; .5 INJECTION, POWDER, FOR SOLUTION INTRAVENOUS; PARENTERAL at 04:15

## 2023-01-01 RX ADMIN — DOXYCYCLINE 100 MG: 100 INJECTION, POWDER, LYOPHILIZED, FOR SOLUTION INTRAVENOUS at 02:07

## 2023-12-22 NOTE — ED PROVIDER NOTES
Subjective   History of Present Illness  83-year-old female presents to the ER due to concerns for altered mental status noted by a local nursing home.  Patient was recently admitted to the facility for altered mental status with a UTI was discharged to this facility for rehab.  They noted the patient's last known well was approximately yesterday evening.  Patient woke up this morning.  Sleepier than usual.  However, patient was increasing lethargic during evaluation physical therapy.  On arrival, patient was able to answer some questions clearly but dysarthric speech was noted.  Patient follows commands without hesitation.  No obvious facial droop.  Stroke alert initiated on arrival.      Review of Systems   Unable to perform ROS: Mental status change       Past Medical History:   Diagnosis Date    Atrial fibrillation     CHF (congestive heart failure)     Hypertension        No Known Allergies    History reviewed. No pertinent surgical history.    History reviewed. No pertinent family history.    Social History     Socioeconomic History    Marital status:    Tobacco Use    Smoking status: Never    Smokeless tobacco: Never   Vaping Use    Vaping Use: Every day   Substance and Sexual Activity    Drug use: Never           Objective   Physical Exam  Constitutional:       General: She is not in acute distress.     Appearance: Normal appearance. She is not ill-appearing.   HENT:      Head: Normocephalic and atraumatic.      Right Ear: External ear normal.      Left Ear: External ear normal.      Nose: Nose normal.      Mouth/Throat:      Mouth: Mucous membranes are moist.   Eyes:      Extraocular Movements: Extraocular movements intact.      Pupils: Pupils are equal, round, and reactive to light.   Cardiovascular:      Rate and Rhythm: Normal rate and regular rhythm.      Heart sounds: No murmur heard.  Pulmonary:      Effort: Pulmonary effort is normal. No respiratory distress.      Breath sounds: Normal breath  sounds. No wheezing.   Abdominal:      General: Bowel sounds are normal.      Palpations: Abdomen is soft.      Tenderness: There is no abdominal tenderness.   Musculoskeletal:         General: No deformity or signs of injury. Normal range of motion.      Cervical back: Normal range of motion and neck supple.   Skin:     General: Skin is warm and dry.      Findings: No erythema.   Neurological:      General: No focal deficit present.      Mental Status: She is alert. She is disoriented and confused.      Cranial Nerves: Dysarthria present. No cranial nerve deficit.      Sensory: Sensation is intact.      Motor: Motor function is intact.   Psychiatric:         Mood and Affect: Mood normal.         Behavior: Behavior normal.         Thought Content: Thought content normal.         Procedures           ED Course  ED Course as of 12/28/23 0115   Fri Dec 22, 2023   1243 Code stroke was initiated on patient arrival.  Telestroke neurology with Dr. Gee was noted at 12:35PM [SF]   1346 EKG notes atrial fibrillation.  85 bpm.  No acute ST elevation.  QTc 433.  Electronically signed by Milo Narayanan DO, 12/22/23, 1:46 PM EST.   [SF]   1737 Total Bilirubin(!): 1.7 [SF]   1755 Appearance, UA(!): Cloudy [SF]   1818 Further investigation revealed and slightly elevated bilirubin.  This bilirubin has had an upward trend over the last few weeks including her last admission at [facility].  Cannot rule out possible source of the patient's altered mental status given stroke workup is negative.  CT imaging and ultrasound imaging pending.  Ammonia pending.  Bili panel pending. [SF]   1843 CT imaging notes concerns for possible large bowel obstruction.  Cholelithiasis noted.  Cholelithiasis confirmed with right upper quadrant ultrasound with no obvious signs of common bile duct distention.  Surgery notified [SF]   1844 Care of this patient was transferred to the next attending physician at shift change.  Complete discussion  of presentation, labs, imaging, care, and expected course occurred during transition of providers.  Vitals stable at transfer of care.    Electronically signed by Milo Narayanan DO, 12/22/23, 6:44 PM EST.   [SF]   1910 Patient care was taken over from Dr. Narayanan at shift change.  Patient ammonia is negative but was found to have a large bowel obstruction as well as a UTI.  General surgery has been made aware to.  Electronically signed by Nita Marcelino DO, 12/22/23, 7:12 PM EST.' [LK]      ED Course User Index  [LK] Nita Marcelino DO  [SF] Milo Narayanan DO      FL Barium Enema Single Contrast    Result Date: 12/26/2023  No evidence of obstruction in the left colon on today's exam. The right colon and proximal half of the transverse colon not imaged.   This report was finalized on 12/26/2023 1:22 PM by Dr. Abhinav Riley MD.      CT Chest Without Contrast Diagnostic    Result Date: 12/26/2023  Bibasilar effusions and bibasilar consolidation Patchy airspace disease is present in the right lung      This report was finalized on 12/26/2023 10:14 AM by Dr. Abhinav Riley MD.      CT Abdomen Pelvis Without Contrast    Result Date: 12/26/2023   1. Mild colonic distention. Distal colon is fluid and stool-filled. Consider direct visualization. Thickening of the right colon wall which may represent a nonspecific colitis.  2. Small volume ascites.  3. Bibasilar effusions and minimal bibasilar airspace disease  4. Cholelithiasis         This report was finalized on 12/26/2023 10:12 AM by Dr. Abhinav Riley MD.         Results for orders placed or performed during the hospital encounter of 12/22/23   Urine Culture - Urine, Urine, Clean Catch    Specimen: Urine, Clean Catch   Result Value Ref Range    Urine Culture Yeast isolated (A)    Blood Culture - Blood, Arm, Right    Specimen: Arm, Right; Blood   Result Value Ref Range    Blood Culture No growth at 5 days    Blood Culture - Blood, Arm, Left    Specimen: Arm, Left; Blood    Result Value Ref Range    Blood Culture No growth at 5 days    Comprehensive Metabolic Panel    Specimen: Arm, Left; Blood   Result Value Ref Range    Glucose 109 (H) 65 - 99 mg/dL    BUN 40 (H) 8 - 23 mg/dL    Creatinine 1.36 (H) 0.57 - 1.00 mg/dL    Sodium 135 (L) 136 - 145 mmol/L    Potassium 4.0 3.5 - 5.2 mmol/L    Chloride 99 98 - 107 mmol/L    CO2 24.9 22.0 - 29.0 mmol/L    Calcium 8.8 8.6 - 10.5 mg/dL    Total Protein 5.9 (L) 6.0 - 8.5 g/dL    Albumin 2.9 (L) 3.5 - 5.2 g/dL    ALT (SGPT) 48 (H) 1 - 33 U/L    AST (SGOT) 86 (H) 1 - 32 U/L    Alkaline Phosphatase 357 (H) 39 - 117 U/L    Total Bilirubin 1.7 (H) 0.0 - 1.2 mg/dL    Globulin 3.0 gm/dL    A/G Ratio 1.0 g/dL    BUN/Creatinine Ratio 29.4 (H) 7.0 - 25.0    Anion Gap 11.1 5.0 - 15.0 mmol/L    eGFR 38.7 (L) >60.0 mL/min/1.73   Protime-INR    Specimen: Arm, Left; Blood   Result Value Ref Range    Protime 30.3 (H) 12.1 - 14.7 Seconds    INR 2.81 (H) 0.90 - 1.10   aPTT    Specimen: Arm, Left; Blood   Result Value Ref Range    PTT 40.4 (H) 26.5 - 34.5 seconds   Single High Sensitivity Troponin T    Specimen: Arm, Right; Blood   Result Value Ref Range    HS Troponin T 25 (H) <14 ng/L   CBC Auto Differential    Specimen: Arm, Left; Blood   Result Value Ref Range    WBC 11.09 (H) 3.40 - 10.80 10*3/mm3    RBC 3.49 (L) 3.77 - 5.28 10*6/mm3    Hemoglobin 11.2 (L) 12.0 - 15.9 g/dL    Hematocrit 34.0 34.0 - 46.6 %    MCV 97.4 (H) 79.0 - 97.0 fL    MCH 32.1 26.6 - 33.0 pg    MCHC 32.9 31.5 - 35.7 g/dL    RDW 23.4 (H) 12.3 - 15.4 %    RDW-SD 82.0 (H) 37.0 - 54.0 fl    MPV 9.4 6.0 - 12.0 fL    Platelets 172 140 - 450 10*3/mm3    Neutrophil % 77.3 (H) 42.7 - 76.0 %    Lymphocyte % 13.3 (L) 19.6 - 45.3 %    Monocyte % 6.1 5.0 - 12.0 %    Eosinophil % 2.3 0.3 - 6.2 %    Basophil % 0.5 0.0 - 1.5 %    Immature Grans % 0.5 0.0 - 0.5 %    Neutrophils, Absolute 8.57 (H) 1.70 - 7.00 10*3/mm3    Lymphocytes, Absolute 1.48 0.70 - 3.10 10*3/mm3    Monocytes, Absolute 0.68 0.10 -  0.90 10*3/mm3    Eosinophils, Absolute 0.25 0.00 - 0.40 10*3/mm3    Basophils, Absolute 0.06 0.00 - 0.20 10*3/mm3    Immature Grans, Absolute 0.05 0.00 - 0.05 10*3/mm3    nRBC 0.0 0.0 - 0.2 /100 WBC   Scan Slide    Specimen: Arm, Left; Blood   Result Value Ref Range    Anisocytosis Large/3+ None Seen    Macrocytes Slight/1+ None Seen    Platelet Morphology Normal Normal   Urinalysis With Culture If Indicated - Urine, Clean Catch    Specimen: Urine, Clean Catch   Result Value Ref Range    Color, UA Yellow Yellow, Straw    Appearance, UA Cloudy (A) Clear    pH, UA 5.5 5.0 - 8.0    Specific Gravity, UA 1.029 1.005 - 1.030    Glucose, UA Negative Negative    Ketones, UA Negative Negative    Bilirubin, UA Negative Negative    Blood, UA Moderate (2+) (A) Negative    Protein, UA Negative Negative    Leuk Esterase, UA Moderate (2+) (A) Negative    Nitrite, UA Negative Negative    Urobilinogen, UA 0.2 E.U./dL 0.2 - 1.0 E.U./dL   Urinalysis, Microscopic Only - Urine, Clean Catch    Specimen: Urine, Clean Catch   Result Value Ref Range    RBC, UA 21-50 (A) None Seen, 0-2 /HPF    WBC, UA Too Numerous to Count (A) None Seen, 0-2 /HPF    Bacteria, UA Trace (A) None Seen /HPF    Squamous Epithelial Cells, UA 3-6 (A) None Seen, 0-2 /HPF    Yeast, UA Moderate/2+ Budding Yeast w/Hyphae None Seen /HPF    Hyaline Casts, UA None Seen None Seen /LPF    Methodology Manual Light Microscopy    Lactic Acid, Plasma    Specimen: Arm, Left; Blood   Result Value Ref Range    Lactate 2.9 (C) 0.5 - 2.0 mmol/L   High Sensitivity Troponin T    Specimen: Arm, Left; Blood   Result Value Ref Range    HS Troponin T 27 (H) <14 ng/L   STAT Lactic Acid, Reflex    Specimen: Arm, Right; Blood   Result Value Ref Range    Lactate 2.4 (C) 0.5 - 2.0 mmol/L   Ammonia    Specimen: Arm, Right; Blood   Result Value Ref Range    Ammonia 21 11 - 51 umol/L   Bilirubin, Total & Direct    Specimen: Arm, Left; Blood   Result Value Ref Range    Total Bilirubin 1.6 (H) 0.0  - 1.2 mg/dL    Bilirubin, Direct 0.9 (H) 0.0 - 0.3 mg/dL    Bilirubin, Indirect 0.7 mg/dL   STAT Lactic Acid, Reflex    Specimen: Arm, Left; Blood   Result Value Ref Range    Lactate 2.2 (C) 0.5 - 2.0 mmol/L   STAT Lactic Acid, Reflex    Specimen: Blood   Result Value Ref Range    Lactate 2.0 0.5 - 2.0 mmol/L   Hepatitis Panel, Acute    Specimen: Blood   Result Value Ref Range    Hepatitis B Surface Ag Non-Reactive Non-Reactive    Hep A IgM Non-Reactive Non-Reactive    Hep B C IgM Non-Reactive Non-Reactive    Hepatitis C Ab Non-Reactive Non-Reactive   C-reactive Protein    Specimen: Arm, Left; Blood   Result Value Ref Range    C-Reactive Protein 3.26 (H) 0.00 - 0.50 mg/dL   Procalcitonin    Specimen: Blood   Result Value Ref Range    Procalcitonin 0.14 0.00 - 0.25 ng/mL   Acetaminophen Level    Specimen: Arm, Right; Blood   Result Value Ref Range    Acetaminophen <5.0 0.0 - 30.0 mcg/mL   CBC Auto Differential    Specimen: Blood   Result Value Ref Range    WBC 9.70 3.40 - 10.80 10*3/mm3    RBC 3.42 (L) 3.77 - 5.28 10*6/mm3    Hemoglobin 10.9 (L) 12.0 - 15.9 g/dL    Hematocrit 34.4 34.0 - 46.6 %    .6 (H) 79.0 - 97.0 fL    MCH 31.9 26.6 - 33.0 pg    MCHC 31.7 31.5 - 35.7 g/dL    RDW 23.6 (H) 12.3 - 15.4 %    RDW-SD 86.2 (H) 37.0 - 54.0 fl    MPV 9.2 6.0 - 12.0 fL    Platelets 151 140 - 450 10*3/mm3    Neutrophil % 76.3 (H) 42.7 - 76.0 %    Lymphocyte % 13.6 (L) 19.6 - 45.3 %    Monocyte % 6.7 5.0 - 12.0 %    Eosinophil % 2.3 0.3 - 6.2 %    Basophil % 0.8 0.0 - 1.5 %    Immature Grans % 0.3 0.0 - 0.5 %    Neutrophils, Absolute 7.40 (H) 1.70 - 7.00 10*3/mm3    Lymphocytes, Absolute 1.32 0.70 - 3.10 10*3/mm3    Monocytes, Absolute 0.65 0.10 - 0.90 10*3/mm3    Eosinophils, Absolute 0.22 0.00 - 0.40 10*3/mm3    Basophils, Absolute 0.08 0.00 - 0.20 10*3/mm3    Immature Grans, Absolute 0.03 0.00 - 0.05 10*3/mm3    nRBC 0.0 0.0 - 0.2 /100 WBC   Comprehensive Metabolic Panel    Specimen: Blood   Result Value Ref Range     Glucose 99 65 - 99 mg/dL    BUN 41 (H) 8 - 23 mg/dL    Creatinine 1.29 (H) 0.57 - 1.00 mg/dL    Sodium 137 136 - 145 mmol/L    Potassium 3.4 (L) 3.5 - 5.2 mmol/L    Chloride 99 98 - 107 mmol/L    CO2 21.4 (L) 22.0 - 29.0 mmol/L    Calcium 8.9 8.6 - 10.5 mg/dL    Total Protein 6.2 6.0 - 8.5 g/dL    Albumin 2.9 (L) 3.5 - 5.2 g/dL    ALT (SGPT) 45 (H) 1 - 33 U/L    AST (SGOT) 74 (H) 1 - 32 U/L    Alkaline Phosphatase 335 (H) 39 - 117 U/L    Total Bilirubin 1.5 (H) 0.0 - 1.2 mg/dL    Globulin 3.3 gm/dL    A/G Ratio 0.9 g/dL    BUN/Creatinine Ratio 31.8 (H) 7.0 - 25.0    Anion Gap 16.6 (H) 5.0 - 15.0 mmol/L    eGFR 41.3 (L) >60.0 mL/min/1.73   Scan Slide    Specimen: Blood   Result Value Ref Range    Anisocytosis Large/3+ None Seen    Dacrocytes Slight/1+ None Seen    Hypochromia Slight/1+ None Seen    Platelet Morphology Normal Normal   CBC (No Diff)    Specimen: Blood   Result Value Ref Range    WBC 8.52 3.40 - 10.80 10*3/mm3    RBC 3.39 (L) 3.77 - 5.28 10*6/mm3    Hemoglobin 10.9 (L) 12.0 - 15.9 g/dL    Hematocrit 32.7 (L) 34.0 - 46.6 %    MCV 96.5 79.0 - 97.0 fL    MCH 32.2 26.6 - 33.0 pg    MCHC 33.3 31.5 - 35.7 g/dL    RDW 23.2 (H) 12.3 - 15.4 %    RDW-SD 80.6 (H) 37.0 - 54.0 fl    MPV 9.8 6.0 - 12.0 fL    Platelets 126 (L) 140 - 450 10*3/mm3   Basic Metabolic Panel    Specimen: Blood   Result Value Ref Range    Glucose 83 65 - 99 mg/dL    BUN 29 (H) 8 - 23 mg/dL    Creatinine 1.08 (H) 0.57 - 1.00 mg/dL    Sodium 136 136 - 145 mmol/L    Potassium 3.1 (L) 3.5 - 5.2 mmol/L    Chloride 102 98 - 107 mmol/L    CO2 22.5 22.0 - 29.0 mmol/L    Calcium 8.3 (L) 8.6 - 10.5 mg/dL    BUN/Creatinine Ratio 26.9 (H) 7.0 - 25.0    Anion Gap 11.5 5.0 - 15.0 mmol/L    eGFR 51.1 (L) >60.0 mL/min/1.73   Magnesium    Specimen: Blood   Result Value Ref Range    Magnesium 2.4 1.6 - 2.4 mg/dL   Basic Metabolic Panel    Specimen: Blood   Result Value Ref Range    Glucose 97 65 - 99 mg/dL    BUN 30 (H) 8 - 23 mg/dL    Creatinine 1.22 (H)  0.57 - 1.00 mg/dL    Sodium 139 136 - 145 mmol/L    Potassium 3.6 3.5 - 5.2 mmol/L    Chloride 104 98 - 107 mmol/L    CO2 26.0 22.0 - 29.0 mmol/L    Calcium 8.5 (L) 8.6 - 10.5 mg/dL    BUN/Creatinine Ratio 24.6 7.0 - 25.0    Anion Gap 9.0 5.0 - 15.0 mmol/L    eGFR 44.1 (L) >60.0 mL/min/1.73   Magnesium    Specimen: Blood   Result Value Ref Range    Magnesium 2.3 1.6 - 2.4 mg/dL   CBC (No Diff)    Specimen: Blood   Result Value Ref Range    WBC 8.74 3.40 - 10.80 10*3/mm3    RBC 3.15 (L) 3.77 - 5.28 10*6/mm3    Hemoglobin 10.1 (L) 12.0 - 15.9 g/dL    Hematocrit 31.2 (L) 34.0 - 46.6 %    MCV 99.0 (H) 79.0 - 97.0 fL    MCH 32.1 26.6 - 33.0 pg    MCHC 32.4 31.5 - 35.7 g/dL    RDW 23.2 (H) 12.3 - 15.4 %    RDW-SD 82.9 (H) 37.0 - 54.0 fl    MPV 10.2 6.0 - 12.0 fL    Platelets 132 (L) 140 - 450 10*3/mm3   Lactic Acid, Plasma    Specimen: Blood   Result Value Ref Range    Lactate 3.1 (C) 0.5 - 2.0 mmol/L   Hepatic Function Panel    Specimen: Blood   Result Value Ref Range    Total Protein 5.7 (L) 6.0 - 8.5 g/dL    Albumin 2.7 (L) 3.5 - 5.2 g/dL    ALT (SGPT) 38 (H) 1 - 33 U/L    AST (SGOT) 54 (H) 1 - 32 U/L    Alkaline Phosphatase 245 (H) 39 - 117 U/L    Total Bilirubin 1.0 0.0 - 1.2 mg/dL    Bilirubin, Direct 0.6 (H) 0.0 - 0.3 mg/dL    Bilirubin, Indirect 0.4 mg/dL   Protime-INR    Specimen: Blood   Result Value Ref Range    Protime 33.1 (H) 12.1 - 14.7 Seconds    INR 3.14 (H) 0.90 - 1.10   C-reactive Protein    Specimen: Blood   Result Value Ref Range    C-Reactive Protein 3.87 (H) 0.00 - 0.50 mg/dL   Procalcitonin    Specimen: Blood   Result Value Ref Range    Procalcitonin 0.15 0.00 - 0.25 ng/mL   Sedimentation Rate    Specimen: Blood   Result Value Ref Range    Sed Rate 6 0 - 30 mm/hr   Blood Gas, Arterial With Co-Ox    Specimen: Arterial Blood   Result Value Ref Range    Site Right Radial     Paul's Test Positive     pH, Arterial 7.408 7.350 - 7.450 pH units    pCO2, Arterial 35.8 35.0 - 45.0 mm Hg    pO2, Arterial  168.0 (H) 83.0 - 108.0 mm Hg    HCO3, Arterial 22.6 20.0 - 26.0 mmol/L    Base Excess, Arterial -1.8 (L) 0.0 - 2.0 mmol/L    O2 Saturation, Arterial >99.2 (H) 94.0 - 99.0 %    Hemoglobin, Blood Gas 9.3 (L) 13.5 - 17.5 g/dL    Hematocrit, Blood Gas 28.5 (L) 38.0 - 51.0 %    Oxyhemoglobin 98.5 94 - 99 %    Methemoglobin 0.30 0.00 - 3.00 %    Carboxyhemoglobin 1.3 0 - 5 %    A-a DO2 8.6 0.0 - 300.0 mmHg    CO2 Content 23.7 22 - 33 mmol/L    Barometric Pressure for Blood Gas 723 mmHg    Modality Nasal Cannula     FIO2 32 %    Flow Rate 3.0 lpm    Ventilator Mode NA     Collected by [id number]     pH, Temp Corrected      pCO2, Temperature Corrected      pO2, Temperature Corrected     Iron Profile    Specimen: Blood   Result Value Ref Range    Iron 188 (H) 37 - 145 mcg/dL    Iron Saturation (TSAT) 83 (H) 20 - 50 %    Transferrin 152 (L) 200 - 360 mg/dL    TIBC 226 (L) 298 - 536 mcg/dL   Ferritin    Specimen: Blood   Result Value Ref Range    Ferritin 483.10 (H) 13.00 - 150.00 ng/mL   Reticulocytes    Specimen: Blood   Result Value Ref Range    Reticulocyte % 1.03 0.70 - 1.90 %    Reticulocyte Absolute 0.0299 0.0200 - 0.1300 10*6/mm3   Basic Metabolic Panel    Specimen: Blood   Result Value Ref Range    Glucose 108 (H) 65 - 99 mg/dL    BUN 28 (H) 8 - 23 mg/dL    Creatinine 1.31 (H) 0.57 - 1.00 mg/dL    Sodium 137 136 - 145 mmol/L    Potassium 3.9 3.5 - 5.2 mmol/L    Chloride 103 98 - 107 mmol/L    CO2 20.4 (L) 22.0 - 29.0 mmol/L    Calcium 8.2 (L) 8.6 - 10.5 mg/dL    BUN/Creatinine Ratio 21.4 7.0 - 25.0    Anion Gap 13.6 5.0 - 15.0 mmol/L    eGFR 40.5 (L) >60.0 mL/min/1.73   CBC Auto Differential    Specimen: Blood   Result Value Ref Range    WBC 8.47 3.40 - 10.80 10*3/mm3    RBC 2.94 (L) 3.77 - 5.28 10*6/mm3    Hemoglobin 9.4 (L) 12.0 - 15.9 g/dL    Hematocrit 29.9 (L) 34.0 - 46.6 %    .7 (H) 79.0 - 97.0 fL    MCH 32.0 26.6 - 33.0 pg    MCHC 31.4 (L) 31.5 - 35.7 g/dL    RDW 23.8 (H) 12.3 - 15.4 %    RDW-SD 87.3 (H)  37.0 - 54.0 fl    MPV 10.7 6.0 - 12.0 fL    Platelets 119 (L) 140 - 450 10*3/mm3    Neutrophil % 62.9 42.7 - 76.0 %    Lymphocyte % 21.7 19.6 - 45.3 %    Monocyte % 8.6 5.0 - 12.0 %    Eosinophil % 5.4 0.3 - 6.2 %    Basophil % 0.9 0.0 - 1.5 %    Immature Grans % 0.5 0.0 - 0.5 %    Neutrophils, Absolute 5.32 1.70 - 7.00 10*3/mm3    Lymphocytes, Absolute 1.84 0.70 - 3.10 10*3/mm3    Monocytes, Absolute 0.73 0.10 - 0.90 10*3/mm3    Eosinophils, Absolute 0.46 (H) 0.00 - 0.40 10*3/mm3    Basophils, Absolute 0.08 0.00 - 0.20 10*3/mm3    Immature Grans, Absolute 0.04 0.00 - 0.05 10*3/mm3    nRBC 0.0 0.0 - 0.2 /100 WBC   Scan Slide    Specimen: Blood   Result Value Ref Range    Anisocytosis Large/3+ None Seen    Hypochromia Slight/1+ None Seen    Macrocytes Slight/1+ None Seen    Platelet Morphology Normal Normal   Lactic Acid, Plasma    Specimen: Blood   Result Value Ref Range    Lactate 3.3 (C) 0.5 - 2.0 mmol/L   Protime-INR    Specimen: Blood   Result Value Ref Range    Protime 24.1 (H) 12.1 - 14.7 Seconds    INR 2.09 (H) 0.90 - 1.10   Basic Metabolic Panel    Specimen: Blood   Result Value Ref Range    Glucose 87 65 - 99 mg/dL    BUN 27 (H) 8 - 23 mg/dL    Creatinine 1.33 (H) 0.57 - 1.00 mg/dL    Sodium 138 136 - 145 mmol/L    Potassium 3.9 3.5 - 5.2 mmol/L    Chloride 105 98 - 107 mmol/L    CO2 17.5 (L) 22.0 - 29.0 mmol/L    Calcium 8.3 (L) 8.6 - 10.5 mg/dL    BUN/Creatinine Ratio 20.3 7.0 - 25.0    Anion Gap 15.5 (H) 5.0 - 15.0 mmol/L    eGFR 39.8 (L) >60.0 mL/min/1.73   CBC Auto Differential    Specimen: Blood   Result Value Ref Range    WBC 8.65 3.40 - 10.80 10*3/mm3    RBC 3.16 (L) 3.77 - 5.28 10*6/mm3    Hemoglobin 10.2 (L) 12.0 - 15.9 g/dL    Hematocrit 32.2 (L) 34.0 - 46.6 %    .9 (H) 79.0 - 97.0 fL    MCH 32.3 26.6 - 33.0 pg    MCHC 31.7 31.5 - 35.7 g/dL    RDW 23.9 (H) 12.3 - 15.4 %    RDW-SD 88.9 (H) 37.0 - 54.0 fl    MPV 10.5 6.0 - 12.0 fL    Platelets 127 (L) 140 - 450 10*3/mm3    Neutrophil % 63.3  42.7 - 76.0 %    Lymphocyte % 23.2 19.6 - 45.3 %    Monocyte % 7.9 5.0 - 12.0 %    Eosinophil % 4.3 0.3 - 6.2 %    Basophil % 0.8 0.0 - 1.5 %    Immature Grans % 0.5 0.0 - 0.5 %    Neutrophils, Absolute 5.48 1.70 - 7.00 10*3/mm3    Lymphocytes, Absolute 2.01 0.70 - 3.10 10*3/mm3    Monocytes, Absolute 0.68 0.10 - 0.90 10*3/mm3    Eosinophils, Absolute 0.37 0.00 - 0.40 10*3/mm3    Basophils, Absolute 0.07 0.00 - 0.20 10*3/mm3    Immature Grans, Absolute 0.04 0.00 - 0.05 10*3/mm3    nRBC 0.0 0.0 - 0.2 /100 WBC   Scan Slide    Specimen: Blood   Result Value Ref Range    Anisocytosis Large/3+ None Seen    Hypochromia Slight/1+ None Seen    Poikilocytes Slight/1+ None Seen    Platelet Morphology Normal Normal   POC Glucose Once    Specimen: Blood   Result Value Ref Range    Glucose 87 70 - 130 mg/dL   POC Creatinine    Specimen: Blood   Result Value Ref Range    Creatinine 1.40 (H) 0.60 - 1.30 mg/dL   ECG 12 Lead Stroke Evaluation   Result Value Ref Range    QT Interval 364 ms    QTC Interval 433 ms   Adult Transthoracic Echo Complete w/ Color, Spectral and Contrast if necessary per protocol   Result Value Ref Range    LVIDd 4.0 cm    LVIDs 2.5 cm    IVSd 1.10 cm    LVPWd 1.20 cm    FS 37.5 %    IVS/LVPW 0.92 cm    ESV(cubed) 15.6 ml    LV Sys Vol (BSA corrected) 7.0 cm2    EDV(cubed) 64.0 ml    LV Holden Vol (BSA corrected) 21.9 cm2    LV mass(C)d 155.4 grams    LVOT area 3.1 cm2    LVOT diam 2.00 cm    EDV(MOD-sp4) 43.7 ml    ESV(MOD-sp4) 14.0 ml    SV(MOD-sp4) 29.7 ml    SI(MOD-sp4) 14.9 ml/m2    EF(MOD-sp4) 68.0 %    MV E max cullen 154.0 cm/sec    LA ESV Index (BP) 30.2 ml/m2    Med Peak E' Cullen 8.9 cm/sec    Lat Peak E' Cullen 13.2 cm/sec    TR max cullen 256.0 cm/sec    Avg E/e' ratio 13.94     TAPSE (>1.6) 2.44 cm    LA dimension (2D)  3.8 cm    Ao pk cullen 179.0 cm/sec    Ao max PG 12.8 mmHg    Ao mean PG 8.0 mmHg    Ao V2 VTI 30.5 cm    AI P1/2t 354.2 msec    TR max PG 26.2 mmHg    RVSP(TR) 36.2 mmHg    RAP systole 10.0 mmHg     PA acc time 0.07 sec    Ao root diam 2.9 cm    ACS 1.70 cm    EF(MOD-bp) 67.0 %   Type & Screen    Specimen: Arm, Left; Blood   Result Value Ref Range    ABO Type O     RH type Positive     Antibody Screen Negative     T&S Expiration Date 12/25/2023 11:59:59 PM    ABO RH Specimen Verification    Specimen: Blood   Result Value Ref Range    ABO Type O     RH type Positive    Green Top (Gel)   Result Value Ref Range    Extra Tube Hold for add-ons.    Lavender Top   Result Value Ref Range    Extra Tube hold for add-on    Gold Top - SST   Result Value Ref Range    Extra Tube Hold for add-ons.    Light Blue Top   Result Value Ref Range    Extra Tube Hold for add-ons.                                             Medical Decision Making  Telestroke neurology team made aware of this patient on arrival.  Patient was quickly transitioned to CT.  Initial head CT without contrast was unremarkable and noted no acute abnormality.  Neurology reviewed CT angiograms of the head and neck and noted no concerns for acute abnormality.  This was confirmed with radiology.  Neurology noted they wish to obtain an MRI.  MRI noted no acute abnormality.  Neurology noted that this patient can be discharged back to the nursing home if mental status improved and further workup was completed.      CBC and CMP are unremarkable.  Urinalysis positive for UTI.  IV antibiotics given.  EKG listed.  Symptoms likely secondary to UTI.  Patient will be discharged back to the nursing home with antibiotic therapy.  Work up and results were discussed throughly with the patient family.  The patient will be discharged for further monitoring and management with their PCP.  Red flags, warning signs, worsening symptoms, and when to return to the ER discussed with and understood by the patient.  Patient will follow up with their PCP in a timely manner.  Patient family expressed full understanding.  Vitals stable at discharge.    Problems Addressed:  Acute UTI:  complicated acute illness or injury  Altered mental status, unspecified altered mental status type: complicated acute illness or injury    Amount and/or Complexity of Data Reviewed  Labs: ordered. Decision-making details documented in ED Course.  Radiology: ordered. Decision-making details documented in ED Course.  ECG/medicine tests: ordered.    Risk  Prescription drug management.  Decision regarding hospitalization.        Final diagnoses:   Acute UTI   Altered mental status, unspecified altered mental status type       ED Disposition  ED Disposition       ED Disposition   Decision to Admit    Condition   --    Comment   Level of Care: Telemetry [5]   Diagnosis: Acute encephalopathy [193153]   Admitting Physician: ARIES MACE [[id number]]   Attending Physician: ARIES MACE [[id number]]   Certification: I Certify That Inpatient Hospital Services Are Medically Necessary For Greater Than 2 Midnights                 Sunshine Prescott PA  [address] [place], [place] [id number] [phone]    In 1 week      [facility] EMERGENCY DEPARTMENT  [address] [place] [id number] [phone]    If symptoms worsen         Medication List        New Prescriptions      cephalexin 500 MG capsule  Commonly known as: KEFLEX  Take 1 capsule by mouth 2 (Two) Times a Day.               Where to Get Your Medications        These medications were sent to [facility] [address] [place] [address] - [phone] [facility] [phone] [address] [address]      Phone: [phone]   cephalexin 500 MG capsule            Milo Narayanan, DO  12/22/23 1736       Milo Narayanan, DO  12/28/23 0115

## 2023-12-22 NOTE — ED NOTES
Called lab to stick patient for blood cultures and lactic acid due to x 3 unsuccessful attempts, spoke with Kelli, she is going to come look.

## 2023-12-22 NOTE — CONSULTS
DOS: 2023  NAME: Cristina Hughes   : 1940  PCP: Sunshine Prescott PA  CC: Patient last seen normal at 11:30 AM when she participated with physical therapy and after that has been hard to arouse.  Referring MD: No ref. provider found    Neurological Problem and Interval History:  83 y.o. right-handed white female with a Hx of coronary artery disease, morbid obesity, on chronic anticoagulation with warfarin who is currently in a nursing home in [place] and she last spoke to her family members yesterday well and also participated with physical therapy and Occupational Therapy at 11:30 AM without any issues.  After that she was late to bed and she went to sleep and when they tried to get her up around noon for the lunch she was not much responsive and then rushed to the emergency room at 12:04 PM.  Dr. Milo Narayanan emergency room physician called me about this patient at the time and said that she would say her name and go back to sleep and she will follow some simple commands and then would go back to sleep.  She has been moving everything morning as well and she will tell me her name and look at me and track me with her eyes but then after picking up her arms she would go back to sleep and will not perform any further functions.  She is currently on chronic anticoagulation with warfarin and her INR is therapeutic at 2.8.  She underwent a CT angiogram of the head and neck with contrast which does not show any evidence of any retrievable thrombus and the CT perfusion was also unremarkable.  I do not think personally that there is any acute stroke as her blood pressure is also been stable and it might be medication effect which is causing the side effects.    Past Medical/Surgical Hx:  No past medical history on file.  No past surgical history on file.    Review of Systems:    Constitutional: Morbidly obese female with a body mass index of 42.4 kg/m².  Cardiovascular: No chest pain or palpitations  noted.  Respiratory: No shortness of breath noted and her oxygen saturations are normal.  Gastrointestinal: No nausea and vomiting noted.  Genitourinary: No bladder incontinence noted.  Musculoskeletal: No aches and pains in the body or joints noted.  Dermatological: No skin breakdown noted.  Neurological: Seems to be hypersomnolent from medication effect or lack of sleep overnight.  Psychiatric: Unable to gauge at this time.  Ophthalmological: Opens her eyes and establishes good eye contact and also wears glasses.          Medications On Admission  Takes warfarin on a regular basis    Prior to Admission medications    Not on File        Allergies:  No Known Allergies    Social Hx:  Social History     Socioeconomic History    Marital status:        Family Hx:  No family history on file.    Review of Imaging (Interpretation of images not reports): CT angiogram of the head with contrast shows the following:    FINDINGS:    RIGHT INTERNAL CAROTID ARTERY:  No acute findings.  Intracranial  segment is patent with no significant stenosis.  No aneurysm.    RIGHT ANTERIOR CEREBRAL ARTERY:  Unremarkable as visualized.  No  occlusion or significant stenosis.  No aneurysm.    RIGHT MIDDLE CEREBRAL ARTERY:  Unremarkable as visualized.  No  occlusion or significant stenosis.  No aneurysm.    RIGHT POSTERIOR CEREBRAL ARTERY:  Unremarkable as visualized.  No  occlusion or significant stenosis.  No aneurysm.    RIGHT VERTEBRAL ARTERY:  Unremarkable as visualized.       LEFT INTERNAL CAROTID ARTERY:  No acute findings.  Intracranial  segment is patent with no significant stenosis.  No aneurysm.    LEFT ANTERIOR CEREBRAL ARTERY:  Unremarkable as visualized.  No  occlusion or significant stenosis.  No aneurysm.    LEFT MIDDLE CEREBRAL ARTERY:  Unremarkable as visualized.  No  occlusion or significant stenosis.  No aneurysm.    LEFT POSTERIOR CEREBRAL ARTERY:  Unremarkable as visualized.  No  occlusion or significant stenosis.   No aneurysm.    LEFT VERTEBRAL ARTERY:  Unremarkable as visualized.       BASILAR ARTERY:  Unremarkable as visualized.  No occlusion or  significant stenosis.  No aneurysm.     IMPRESSION:    No acute findings in the arteries of the head/brain.    CT Head Without Contrast Stroke Protocol    Result Date: 12/22/2023  EXAM:   CT Head Without Intravenous Contrast  EXAM DATE:   12/22/2023 12:29 PM  CLINICAL HISTORY:   Neuro deficit, acute stroke suspected  TECHNIQUE:   Axial computed tomography images of the head/brain without intravenous contrast.  Sagittal and coronal reformatted images were created and reviewed.  This CT exam was performed using one or more of the following dose reduction techniques:  automated exposure control, adjustment of the mA and/or kV according to patient size, and/or use of iterative reconstruction technique.  COMPARISON:   No relevant prior studies available.  FINDINGS:   BRAIN AND EXTRA-AXIAL SPACES:  Areas of decreased attenuation in the deep cerebral white matter are consistent with small vessel ischemic/degenerative changes.  No hemorrhage.   BONES/JOINTS:  Unremarkable as visualized.  No acute fracture.   SOFT TISSUES:  Unremarkable as visualized.   SINUSES:  Unremarkable as visualized.  No acute sinusitis.   MASTOID AIR CELLS:  Unremarkable as visualized.  No mastoid effusion.      Impression:   Small vessel ischemic/degenerative changes.   This report was finalized on 12/22/2023 1:03 PM by Dr. Harry Gross MD.      CT Head Without Contrast    Result Date: 12/14/2023  CT HEAD WITHOUT IV CONTRAST   12/14/2023 8:00 PM HISTORY: Altered mental status. TECHNIQUE: Multiple axial CT images were performed from the foramen magnum to the vertex. Coronal reformatted images were reconstructed from axial data set. Individualized dose reduction techniques using automated exposure control or adjustment of mA and/or kV according to the patient size were employed. COMPARISON: 12/8/2023 FINDINGS: No  acute intracranial hemorrhage or large acute cortical infarct. Chronic small vessel ischemic white matter changes and generalized cerebral volume loss are present. Ventricles are prominent. No midline shift. The basal cisterns are patent. No skull fracture. The visualized paranasal sinuses and mastoid air cells are clear.    Impression: No acute intracranial hemorrhage or large acute cortical infarct. Images personally reviewed, interpreted and dictated by KALI Miranda M.D.    CT Head Without Contrast    Result Date: 12/8/2023  HEAD CT     12/8/2023 9:02 AM HISTORY: Acute headache. COMPARISON: November 2023 TECHNIQUE: Multiple axial CT images were performed from the foramen magnum to the vertex. This study was performed with techniques to keep radiation doses as low as reasonably achievable, (ALARA). Individualized dose reduction techniques using automated exposure control or adjustment of mA and/or kV according to the patient size were employed. FINDINGS: The ventricles are enlarged. There is diffuse atrophy. There is periventricular white matter change likely related to small vessel disease. There is no evidence of hemorrhage. No masses are identified. No extra-axial fluid is seen. The sinuses are normal.    Impression: Atrophy and chronic changes without acute process. Images reviewed, interpreted, and dictated by WALTER Vasquez MD            Additional Tests Performed: The CT angiogram of the neck shows the following:    FINDINGS:      VASCULATURE:    RIGHT COMMON CAROTID ARTERY:  Unremarkable as visualized.  No  occlusion or significant stenosis.  No dissection.    RIGHT INTERNAL CAROTID ARTERY:  Mild bilateral ICA atherosclerotic  calcific stenosis.  No dissection.    RIGHT EXTERNAL CAROTID ARTERY:  Unremarkable as visualized.  No  occlusion.    RIGHT VERTEBRAL ARTERY:  Unremarkable as visualized.  No occlusion or  significant stenosis.  No dissection.       LEFT COMMON CAROTID ARTERY:  Unremarkable  as visualized.  No occlusion  or significant stenosis.  No dissection.    LEFT INTERNAL CAROTID ARTERY:  See above.    LEFT EXTERNAL CAROTID ARTERY:  Unremarkable as visualized.  No  occlusion.    LEFT VERTEBRAL ARTERY:  The cervical portion of the left vertebral  artery may be occluded or very diminutive.  No occlusion or significant  stenosis.      NECK:    BONES/JOINTS:  Unremarkable as visualized.    SOFT TISSUES:  Unremarkable as visualized.    LUNG APICES:  Clear.      CAROTID STENOSIS REFERENCE USING NASCET CRITERIA:    % ICA stenosis = (1 - narrowest ICA diameter/diameter of distal  cervical ICA) x 100.    Mild - <50% stenosis.    Moderate - 50-69% stenosis.    Severe - 70-94% stenosis.    Near occlusion - 95-99% stenosis.    Occluded - 100% stenosis.     IMPRESSION:  1.  The cervical portion of the left vertebral artery may be occluded or  very diminutive.  2.  Mild bilateral ICA atherosclerotic calcific stenosis.       The CT cerebral perfusion shows the following:    COMPARISON:    None.     FINDINGS:    ARTERIAL INPUT FUNCTION:  Optimal.    ESTIMATED INFARCT CORE, RCBF < 30%: 0    PERFUSION, TMAX > 6S:  12.    PERFUSION, TMAX > 10S:  0.    MISMATCH VOLUME:  0.    MISMATCH RATIO:  N/A.       BRAIN AND EXTRA-AXIAL SPACES:  No intracranial hemorrhage.     IMPRESSION:  No core infarct detected.         The CT of the abdomen was performed urgently that showed the following:    FINDINGS:     Mild enlarged heart size.  Small right and left pleural effusion, left greater than right with  associated compressive atelectasis in the lung bases.  Calcified granulomas in the spleen.  Cholelithiasis.  Small volume of free fluid in the abdomen and there is a small volume of  free fluid along the right and left colic gutter.  No features of cholecystitis or pancreatitis.  No acute process seen in the kidneys.  No abdominal aortic aneurysm or retroperitoneal hemorrhage.  Multilevel degenerative disc disease throughout the  "lumbar spine with  endplate and facet hypertrophic changes.  Mild osteoarthritis at the right and left hip joint.  Distended configuration to large bowel segments throughout the abdomen  and pelvis with multiple air-fluid levels suggestive of diffuse ileus  versus underlying partial distal large bowel obstruction.  No herniated bowel segment.  No acute process seen in the uterus.  No acute process seen in the bladder.     IMPRESSION:     1.  Multiple distended large bowel segments with air-fluid levels  throughout the colon suggestive of diffuse ileus versus underlying  partial distal large bowel obstruction.  2.  The appendix is not identified.  3.  Cholelithiasis.  4.  Small volume of ascites in the abdomen extending to the upper right  and left colic gutter.  5.  Small bilateral pleural effusions, left greater than right with  compressive atelectasis at the lung bases.  6.  Right lower lobe calcified granuloma.  7.  Calcified granulomas in the spleen.  8.  Multilevel degenerative disc disease throughout the lumbar spine  with endplate and facet hypertrophic changes.  9.  No abdominal aortic aneurysm or retroperitoneal hemorrhage.  10.  No free air, abscess, or hematoma.       Laboratory Results:   Lab Results   Component Value Date    GLUCOSE 109 (H) 12/22/2023    CALCIUM 8.8 12/22/2023     (L) 12/22/2023    K 4.0 12/22/2023    CO2 24.9 12/22/2023    CL 99 12/22/2023    BUN 40 (H) 12/22/2023    CREATININE 1.36 (H) 12/22/2023    BCR 29.4 (H) 12/22/2023    ANIONGAP 11.1 12/22/2023     Lab Results   Component Value Date    WBC 11.09 (H) 12/22/2023    HGB 11.2 (L) 12/22/2023    HCT 34.0 12/22/2023    MCV 97.4 (H) 12/22/2023     12/22/2023       Lab Results   Component Value Date    INR 2.81 (H) 12/22/2023    PROTIME 30.3 (H) 12/22/2023            Physical Examination:  /62   Pulse 82   Temp 99 °F (37.2 °C) (Axillary)   Resp 20   Ht 152.4 cm (60\")   Wt 98.4 kg (217 lb)   SpO2 100%   BMI 42.38 " kg/m²   General Appearance:   Well developed, well nourished, well groomed, very stuporous but arousable..  HEENT: Normocephalic.  Wears corrective lenses..  Neck and Spine: Normal range of motion.  Normal alignment. No mass or tenderness. No bruits.    Extremities:    No edema or deformities. Normal joint ROM.   Skin:    No rashes or birth marks.    Neurological examination:  Higher Integrative  Function: Only able to answer her name and then falls asleep.  Upon requesting to move her arms and legs she does them and then falls asleep..  CN II:  Pupils are equal, round, and reactive to light.  Visual fields could not be assessed at this time.  CN III IV VI: Extraocular movements are full without nystagmus.   CN V:  Normal facial sensation and strength of muscles of mastication.  CN VII:  Facial movements are symmetric. No weakness.  CN VIII: Auditory acuity is normal.  CN IX & X: Symmetric palatal movement.  CN XI:  Sternocleidomastoid and trapezius are normal.  No weakness.  CN XII:  The tongue is midline.  No atrophy or fasciculations.  Motor:  Moves her extremities only slightly and then tends to fall asleep.  Sensation: Appreciates noxious stimulation.    Station and Gait: Could not be tested at this time.  Coordination:  Could not be tested as she is too drowsy  NIHSS:    Could not be tested now because of her drowsy state.    Urgent MRI of the brain with a stroke protocol was performed that showed the following:    FINDINGS:    BRAIN AND EXTRA-AXIAL SPACES:  Abnormal T2 signal in the deep cerebral  white matter is consistent with small vessel ischemic/degenerative  changes.  The cerebral and cerebellar sulci are prominent consistent  with brain atrophy.  No hemorrhage.    BONES/JOINTS:  Unremarkable as visualized.    SINUSES:  Unremarkable as visualized.  No acute sinusitis.    MASTOID AIR CELLS:  Unremarkable as visualized.  No mastoid effusion.    ORBITS:  Unremarkable as visualized.     IMPRESSION:  1.   Small vessel ischemic/degenerative changes.  2.  Cerebral and cerebellar atrophy.   Discussion:  83 y.o. who presents with Sx of hypersomnolence which could be related to excessive tiredness or lack of sleep or medications.  Does not look like any stroke as the patient is already anticoagulated and her vitals are all stable.  She would not have been a candidate for any acute thrombolytic therapy anyway and also there is no obvious mismatch in the CT perfusion and the CT angiogram of the head and neck with contrast did not show any evidence of any retrievable thrombus.  No seizure activity has been noted so far.  The MRI of the brain did not show any acute stroke    Plan:  The CT of the abdomen is suggestive of an acute abdomen..  I have also requested the registered nurse to get a full list of all her home medications from the nursing home from where she came.  As the neuroimaging is unremarkable with MRI the patient is currently being evaluated by general surgery for acute abdomen..  If she has been given narcotics then Narcan administration could be helpful.  If her condition does not improve then she needs to come in for evaluation by the general surgeon for acute abdomen.  I do not think any further neurological workup is needed at this time.  Will be signing off.  Please reconsult if needed..  Discussed with Dr. Milo Narayanan emergency room physician about her current condition and if she improves then she can return back to the nursing home from where she came but if she does not then to be observed overnight under the hospitalist care.       I have discussed the above with the patient and family.  Time spent with patient: 70 minutes in face-to-face evaluation and management of the patient using the dedicated telemedicine device without any interruption with the help of the emergency room nurse  with the patient located at the [facility] and myself at a remote location.    Electronically  signed by Cesar Gee MD, 12/22/23, 2:10 PM EST.    Dictated using Dragon dictation.

## 2023-12-23 PROBLEM — G93.40 ACUTE ENCEPHALOPATHY: Status: ACTIVE | Noted: 2023-01-01

## 2023-12-23 NOTE — SIGNIFICANT NOTE
12/23/23 1118   OTHER   Discipline speech language pathologist     Consult received. Chart review. Per chart review, Dr Vaughan request for pt to remain NPO until further recommendations from general surgery consultation received. RITA Bee states pt was seen this am by general surgeon however pt remains NPO. RN states she will contact to determine further recommendations. SLP dysphagia evaluation on hold at this time until further clearance received. RN states verbal understanding and agreement.    Thank you  Asaf Epperson MA CCC-SLP

## 2023-12-23 NOTE — PROGRESS NOTES
Patient seen and examined today in follow up for partial SBO and UTI after overnight admission. She was resting comfortably at time of exam. Barium enema performed today to evaluate for rectal obstruction, results pending.     Yvonne Medrano DO   12/23/23 18:38 EST

## 2023-12-23 NOTE — PLAN OF CARE
Goal Outcome Evaluation:     Patient admitted to  this shift. Patient resting in bed. No complaints voiced. No visible indicators of acute distress noted. Will continue to follow plan of care this shift.

## 2023-12-23 NOTE — CONSULTS
Patient Name:  Cristina Hughes  YOB: 1940  [id number]       Patient Care Team:  Sunshine Prescott PA as PCP - General (Family Medicine)      General Surgery Consult Note     Date of Consultation: 12/23/23    Consulting Physician : Yvonne Medrano DO    Reason for Consult : large bowel obstrucition    Subjective     I have been asked to see  Cristina Hughes , a 83 y.o. female in consultation for concern for large bowel obstruction. She is a difficult historian and is unable to communicate. Per chart review, presented to ER due to somnolence and CT imaging concerning for large bowel obstruction. She was able to respond that she does not have abdominal pain this morning. She has been having multiple episodes of incontinence of liquid stool.       Allergy: No Known Allergies    Medications:  [Held by provider] apixaban, 5 mg, Oral, Q12H  [Held by provider] aspirin, 81 mg, Oral, Daily  cefTRIAXone, 1,000 mg, Intravenous, Q24H  [START ON 12/25/2023] digoxin, 125 mcg, Oral, Once per day on Mon Wed Fri  Linezolid, 600 mg, Intravenous, Q12H  metoprolol tartrate, 50 mg, Oral, Q12H  miconazole, 1 application , Topical, Q12H  senna-docusate sodium, 2 tablet, Oral, BID  sodium chloride, 10 mL, Intravenous, Q12H      sodium chloride, 75 mL/hr, Last Rate: 75 mL/hr (12/23/23 0248)      No current facility-administered medications on file prior to encounter.     Current Outpatient Medications on File Prior to Encounter   Medication Sig    apixaban (ELIQUIS) 5 MG tablet tablet Take 1 tablet by mouth Every 12 (Twelve) Hours.    aspirin 81 MG EC tablet Take 1 tablet by mouth Daily.    cefdinir (OMNICEF) 300 MG capsule Take 1 capsule by mouth 2 (Two) Times a Day.    digoxin (LANOXIN) 125 MCG tablet Take 1 tablet by mouth 3 (Three) Times a Week.    furosemide (LASIX) 40 MG tablet Take 1 tablet by mouth Daily.    linaclotide (Linzess) 145 MCG capsule capsule Take 1 capsule by mouth Every Morning Before Breakfast.     "metoprolol tartrate (LOPRESSOR) 50 MG tablet Take 1 tablet by mouth Every 12 (Twelve) Hours.    ipratropium-albuterol (DUO-NEB) 0.5-2.5 mg/3 ml nebulizer Take 3 mL by nebulization Every 6 (Six) Hours As Needed for Wheezing.    mirtazapine (REMERON) 15 MG tablet Take 1 tablet by mouth Every Night.       PMHx:   Past Medical History:   Diagnosis Date    Atrial fibrillation     CHF (congestive heart failure)     Hypertension        Past Surgical History, Social Hx, Surgical Hx:  Unknown due to mental condition          Objective     Physical Exam:      Vital Signs  /43 (BP Location: Right leg, Patient Position: Lying)   Pulse 110   Temp 99.4 °F (37.4 °C) (Axillary)   Resp 20   Ht 157.5 cm (62\")   Wt 100 kg (221 lb 8 oz) Comment: new admit  SpO2 97%   BMI 40.51 kg/m²     Intake/Output Summary (Last 24 hours) at 12/23/2023 1029  Last data filed at 12/23/2023 0435  Gross per 24 hour   Intake 150 ml   Output --   Net 150 ml         Physical Exam:    Head: Normocephalic, atraumatic.   Mouth: No lesions noted  CV: Regular rate and rhythm   Lungs: Bilateral chest rise and fall, no use of accessory muscles   Abdomen: Soft, nontender, nondistended  Extremities:  No cyanosis, clubbing or edema bilaterally   Neurologic: Somnolent  Rectal exam: tight anal sphincter, no palpable mass on exam (not well tolerated exam)      Results Review: I have personally reviewed all of the recent lab and imaging results available at this time: CT scan with liquid stool in the colon, no evidence of filling of the rectum            Assessment and Plan:    Problem List Items Addressed This Visit    None  Visit Diagnoses       Acute UTI    -  Primary    Relevant Medications    cefTRIAXone (ROCEPHIN) 1,000 mg in sodium chloride 0.9 % 100 mL IVPB-VTB (Completed)    cephalexin (KEFLEX) 500 MG capsule    cefTRIAXone (ROCEPHIN) 1,000 mg in sodium chloride 0.9 % 100 mL IVPB-VTB (Start on 12/23/2023  3:00 PM)    cefdinir (OMNICEF) 300 MG " capsule    Altered mental status, unspecified altered mental status type                 Active Hospital Problems    Diagnosis  POA    **Acute encephalopathy [G93.40]  Yes      Resolved Hospital Problems   No resolved problems to display.     Ms Hughes is an 82 yo F w/ concerns for a partial large bowel obstruction. She is not fully obstructed as she has been having bowel movements. There does not appear to be impacted stool in her rectum on the CT scan. Digital rectal exam did not reveal an obstructing mass within the reach of my finger. Will plan for barium enema for evaluation for more proximal obstructive process.     I discussed the patient's findings and my recommendations with the patient and/or family, as well as the primary team     Josselin Avalos MD  12/23/23  10:29 EST

## 2023-12-23 NOTE — SIGNIFICANT NOTE
12/23/23 1202   OTHER   Discipline speech language pathologist   Recommendations   SLP - Next Appointment 12/23/23       RITA Bee contacts SLP stating Dr Bailey townsend/ general surgery states clearance for SLP to complete dysphagia evaluation and initiate appropriate po diet orders. Further results and recommendations pending dysphagia evaluation.    Thank you   Asaf Epperson MA CCC-SLP

## 2023-12-23 NOTE — ED NOTES
Patient has been transported to MRI at this time with MRI tech and ER tech on ER stretcher with NC.

## 2023-12-23 NOTE — SIGNIFICANT NOTE
12/23/23 1306   OTHER   Discipline speech language pathologist   Rehab Time/Intention   Session Not Performed other (see comments)   Therapy Assessment/Plan (PT)   Criteria for Skilled Interventions Met (PT) other (see comments)   Recommendations   SLP - Next Appointment 12/26/23       Upon SLP entry to bedside, pt asleep. Pt w/ minimal arousal to SLP loud verbal stimuli. She is not able to awaken for functional participation despite max cues and attempts from SLP. Pt family leaving bedside upon SLP entrance and states pt has not been able to arouse during their visit.    Per this status, pt is not safe for po intake at this time. Recommendation for continued NPO status w/ nutrition/hydration/medications via non oral route.    SLP d/w RIAT Bee and Dr Yvonne Medrano who states verbal understanding and agreement.    Thank you  Asaf Epperson MA CCC-SLP

## 2023-12-23 NOTE — ED NOTES
Patient has been cleaned, linens are dry. Gown has been replaced and is now clean. There is no acute distress that has been noted at this time. No new complaints noted. Patient has been repositioned and is comfortable. Call light has been placed within patient reach. IV is patent. Patient is on 3L NC.

## 2023-12-23 NOTE — H&P
[facility] Medicine Services  HISTORY & PHYSICAL    Patient Identification:  Name:  Cristina Hughes  Age:  83 y.o.  Sex:  female  :  1940  MRN:  [id number]   Visit Number:  [id number]  Admit Date: 2023   Primary Care Physician:  Sunshine Prescott PA     Subjective     Chief complaint:   Chief Complaint   Patient presents with    Altered Mental Status     History of presenting illness:   Patient is a 83 y.o. female with past medical history significant for pulmonary embolism, blood clotting disorder, combined systolic and diastolic heart failure, dilated cardiomyopathy, DVT, hypertension, atrial fibrillation, pulmonary hypertension that presented to the [facility] emergency department for evaluation of altered mental status.  Was recently admitted to [facility] on 2023 due to generalized weakness and altered mental status.  At that time patient was found to have a UTI that grew VRE and was treated with oral Zyvox.  Patient was also found to have a distended colon on CT abdomen, which was thought to be related to [place]'s.  Stool studies were negative at that time.  Rectal tube was used on admission.  Patient was ultimately discharged to nursing home for rehab.  Patient was sent to the ED by EMS due to altered mental status/lethargy.  On presentation to our ER, due to being altered and having dysarthria, code stroke was called, and teleneurology evaluated the patient.  All imaging of head and neck was unremarkable.  Neurology signed off and stated this was thought to be medication induced or metabolic rather than due to CVA.  Patient examined at bedside on 3 S.  RITA Schmitz also present.  Patient is oriented to self and time but not location or situation.  Patient is complaining of nausea but states she has no other complaints.  Denied abdominal pain, dysuria, chest pain.  Patient was tender when palpating abdomen though.  Patient did get pleasantly  confused at times during interview.    Upon arrival to the ED, vitals were temperature DNR, HR 88, RR 20, /82, SpO2 98% on 2 L.  Labs significant for creatinine 1.36, BUN 40, BUN/creatinine ratio 29.4, GFR 38.7, alk phos 357, AST 86, ALT 48, total bilirubin 1.7.  WBC 11.09, hemoglobin 11.2.  UA cloudy, moderate blood, moderate leukocytes, 21-50 RBC, too numerous WBC to count, trace bacteria, 3-6 squamous epithelial cells.    CT head shows small vessel ischemic/degenerative changes.  Head and neck CT angiogram no acute findings.  CT abdomen/pelvis shows multiple distended large bowel segments with air-fluid levels suggestive of diffuse ileus versus underlying partial distal large bowel obstruction.  chest x-ray with left lower lobe atelectasis.  Small volume of ascites in the abdomen extending to the  upper right and left colonic gutter.  Small bilateral pleural effusions.  Right lower lobe calcified granuloma.  Calcified granulomas in the spleen.  Degenerative disc disease.  No abdominal aortic aneurysm or retroperitoneal hemorrhage.  No free air or abscess or hematoma MRCP shows cholelithiasis.  No features of choledocholithiasis.  No dilated intrahepatic or extrahepatic bile ducts or pancreatic duct.  No features of pancreatitis or cholecystitis.  Bladder ultrasound shows cholelithiasis without cholecystitis.  Minimal gallbladder wall thickening could be due to edema from underlying ascites.    Patient has been admitted to the Telemetry unit.   ---------------------------------------------------------------------------------------------------------------------   Review of Systems   Unable to perform ROS: Mental status change      ---------------------------------------------------------------------------------------------------------------------   No past medical history on file.  No past surgical history on file.  No family history on file.  Social History     Socioeconomic History    Marital status:       ---------------------------------------------------------------------------------------------------------------------   Allergies:  Patient has no known allergies.  ---------------------------------------------------------------------------------------------------------------------   Medications below are reported home medications pulling from within the system; at this time, these medications have not been reconciled unless otherwise specified and are in the verification process for further verifcation as current home medications.    Prior to Admission Medications       None          ---------------------------------------------------------------------------------------------------------------------    Objective     Hospital Scheduled Meds:  cefTRIAXone, 1,000 mg, Intravenous, Q24H  Linezolid, 600 mg, Intravenous, Q12H  miconazole, 1 application , Topical, Q12H  senna-docusate sodium, 2 tablet, Oral, BID  sodium chloride, 10 mL, Intravenous, Q12H      sodium chloride, 75 mL/hr, Last Rate: 75 mL/hr (12/23/23 9332)        Current listed hospital scheduled medications may not yet reflect those currently placed in orders that are signed and held, awaiting patient's arrival to floor/unit.    ---------------------------------------------------------------------------------------------------------------------   Vital Signs:  Temp:  [99 °F (37.2 °C)-99.5 °F (37.5 °C)] 99.5 °F (37.5 °C)  Heart Rate:  [] 85  Resp:  [19-20] 19  BP: ()/(55-92) 110/57  Mean Arterial Pressure (Non-Invasive) for the past 24 hrs (Last 3 readings):   Noninvasive MAP (mmHg)   12/23/23 0139 73   12/23/23 0015 105   12/22/23 2315 70     SpO2 Percentage    12/22/23 2145 12/23/23 0019 12/23/23 0139   SpO2: 98% 98% 98%     SpO2:  [98 %-100 %] 98 %  on  Flow (L/min):  [2] 2;   Device (Oxygen Therapy): nasal cannula    Body mass index is 40.51 kg/m².  Wt Readings from Last 3 Encounters:   12/23/23 100 kg (221 lb 8 oz)   12/22/23  98.4 kg (217 lb)     ---------------------------------------------------------------------------------------------------------------------   Physical Exam:  Physical Exam  Exam conducted with a chaperone present (RITA Schmitz).   Constitutional:       General: She is not in acute distress.     Appearance: Normal appearance.   HENT:      Head: Normocephalic and atraumatic.      Right Ear: External ear normal.      Left Ear: External ear normal.      Nose: No nasal deformity.      Mouth/Throat:      Lips: Pink.      Mouth: Mucous membranes are moist.   Eyes:      Conjunctiva/sclera: Conjunctivae normal.      Pupils: Pupils are equal, round, and reactive to light.   Cardiovascular:      Rate and Rhythm: Normal rate. Rhythm irregularly irregular.      Heart sounds: Normal heart sounds.   Pulmonary:      Effort: Pulmonary effort is normal.      Breath sounds: Normal breath sounds. No wheezing, rhonchi or rales.   Abdominal:      General: Abdomen is protuberant. Bowel sounds are decreased. There is distension.      Palpations: Abdomen is soft.      Tenderness: There is generalized abdominal tenderness. There is no guarding or rebound.      Comments: High-pitched bowel sounds   Genitourinary:     Comments: No mckeon catheter in place   Vaginal bleeding noted  Musculoskeletal:      Cervical back: Neck supple. Normal range of motion.      Right lower le+ Pitting Edema present.      Left lower le+ Pitting Edema present.   Skin:     General: Skin is warm and dry.   Neurological:      General: No focal deficit present.      Mental Status: She is alert. She is disoriented and confused.      Comments: Oriented to person and time, not place or situation.  Pleasantly confused   Psychiatric:         Mood and Affect: Mood normal.         Behavior: Behavior normal.       ---------------------------------------------------------------------------------------------------------------------  EKG: Atrial fibrillation.  Heart rate 85.   "Confirmed by cardiology.        I have personally reviewed the EKG/Telemetry strip  ---------------------------------------------------------------------------------------------------------------------   Results from last 7 days   Lab Units 12/22/23  1626 12/22/23  1424   HSTROP T ng/L 27* 25*           Results from last 7 days   Lab Units 12/23/23  0211 12/23/23  0156 12/22/23  2201 12/22/23  1844 12/22/23  1626 12/22/23  1605 12/22/23  1239   CRP mg/dL  --   --   --   --  3.26*  --   --    LACTATE mmol/L  --  2.0 2.2* 2.4*  --    < >  --    WBC 10*3/mm3 9.70  --   --   --   --   --  11.09*   HEMOGLOBIN g/dL 10.9*  --   --   --   --   --  11.2*   HEMATOCRIT % 34.4  --   --   --   --   --  34.0   MCV fL 100.6*  --   --   --   --   --  97.4*   MCHC g/dL 31.7  --   --   --   --   --  32.9   PLATELETS 10*3/mm3 151  --   --   --   --   --  172   INR   --   --   --   --   --   --  2.81*    < > = values in this interval not displayed.     Results from last 7 days   Lab Units 12/23/23  0211 12/22/23  1626 12/22/23  1240 12/22/23  1239   SODIUM mmol/L 137  --   --  135*   POTASSIUM mmol/L 3.4*  --   --  4.0   CHLORIDE mmol/L 99  --   --  99   CO2 mmol/L 21.4*  --   --  24.9   BUN mg/dL 41*  --   --  40*   CREATININE mg/dL 1.29*  --  1.40* 1.36*   CALCIUM mg/dL 8.9  --   --  8.8   GLUCOSE mg/dL 99  --   --  109*   ALBUMIN g/dL 2.9*  --   --  2.9*   BILIRUBIN mg/dL 1.5* 1.6*  --  1.7*   ALK PHOS U/L 335*  --   --  357*   AST (SGOT) U/L 74*  --   --  86*   ALT (SGPT) U/L 45*  --   --  48*   Estimated Creatinine Clearance: 36.6 mL/min (A) (by C-G formula based on SCr of 1.29 mg/dL (H)).  Ammonia   Date Value Ref Range Status   12/22/2023 21 11 - 51 umol/L Final       Glucose   Date/Time Value Ref Range Status   12/22/2023 1235 87 70 - 130 mg/dL Final     No results found for: \"HGBA1C\"  No results found for: \"TSH\", \"FREET4\"    No results found for: \"BLOODCX\"  No results found for: \"URINECX\"  No results found for: \"WOUNDCX\"  No " "results found for: \"STOOLCX\"  No results found for: \"RESPCX\"  No results found for: \"MRSACX\"  Pain Management Panel           No data to display              I have personally reviewed the above laboratory results.   ---------------------------------------------------------------------------------------------------------------------  Imaging Results (Last 7 Days)       Procedure Component Value Units Date/Time    MRI abdomen wo contrast mrcp [[id number]] Collected: 12/22/23 2159     Updated: 12/22/23 2208    Narrative:      PROCEDURE: MRI examination of the abdomen performed without IV contrast.  The examination was performed with coronal T2 haste and axial T2 haste  and axial T2 fat saturation sequences. Additional imaging performed in  multiple planes according to an MRCP protocol for assessment of the  common bile duct. No IV contrast. 3D T2 imaging also performed.     HISTORY: Possible gallstone in the common bile duct.     COMPARISON: None.     FINDINGS:     Small right pleural effusion and small to medium size left pleural  effusion.  Mild enlarged heart size.  Small volume of free fluid in the right upper quadrant and left upper  quadrant consistent with ascites. Free fluid extends into the right and  left colic gutter.  Multiple gallstones noted in the gallbladder lumen.  No stone identified in the common bile duct.  No features of choledocholithiasis.  The common bile duct is normal in size.  No features of cholecystitis.  No features of pancreatitis.  No acute process seen in the adrenal glands, spleen, kidneys, and  pancreas.  No dilated pancreatic duct.  No conclusive features of acute pancreatitis.  Distended configuration to large bowel segments with occasional  air-fluid levels suggestive of underlying large bowel ileus.  Small volume of free fluid noted in the lateral left abdomen.  Mild degenerative disc disease throughout the thoracic spine and lumbar  spine with slight levoconvex curvature at the " thoracolumbar junction.  No abdominal aortic aneurysm or retroperitoneal hemorrhage.       Impression:         1.  Cholelithiasis.  2.  No features of choledocholithiasis.  3.  No dilated intrahepatic or extrahepatic bile ducts.  4.  No dilated pancreatic duct.  5.  No features of pancreatitis or cholecystitis.  6.  Bilateral pleural effusions, left greater than right with features  of compressive atelectasis at the lung bases.  7.  Small volume of ascites within the abdomen.  8.  Air-fluid levels in the large bowel segments possibly due to  underlying ileus.  9.  Possible small size hiatal hernia.  10.  No acute process seen in the liver, pancreas, adrenal glands,  kidneys, and spleen.        This report was finalized on 12/22/2023 10:05 PM by Jaden Stephens MD.       US Gallbladder [[id number]] Collected: 12/22/23 1828     Updated: 12/22/23 1832    Narrative:      PROCEDURE: Right upper quadrant gallbladder abdominal ultrasound  evaluation performed on December 22, 2023. Color flow imaging performed.     HISTORY: Cholelithiasis. Evaluate gallbladder.     COMPARISON: None.     FINDINGS:     Multiple gallstones present in the gallbladder lumen.  The gallbladder wall measures 3.4 mm in thickness.  No pericholecystic fluid.  The common bile duct measures 4.2 mm in diameter.  Patent main portal vein with appropriate direction of flow.       Impression:         1.  Cholelithiasis.  2.  No features of cholecystitis.  3.  Minimal gallbladder wall thickening up to 3.4 mm could be due to  mild edema from underlying ascites.  4.  No pericystic fluid identified  5.  Normal common bile duct.  6.  Normal main portal vein.     This report was finalized on 12/22/2023 6:30 PM by Jaden Stephens MD.       CT Abdomen Pelvis Without Contrast [[id number]] Collected: 12/22/23 1823     Updated: 12/22/23 1830    Narrative:      PROCEDURE: CT of the abdomen and pelvis performed on December 22, 2023.  The examination was performed with 4 mm  axial imaging and sagittal and  coronal reconstruction images. The examination was performed according  to as low as reasonably achievable dose protocol. Total DLP = 2041.     HISTORY: Abdominal pain. Acute onset symptoms. Nonlocalized.     COMPARISON: None.     FINDINGS:     Mild enlarged heart size.  Small right and left pleural effusion, left greater than right with  associated compressive atelectasis in the lung bases.  Calcified granulomas in the spleen.  Cholelithiasis.  Small volume of free fluid in the abdomen and there is a small volume of  free fluid along the right and left colic gutter.  No features of cholecystitis or pancreatitis.  No acute process seen in the kidneys.  No abdominal aortic aneurysm or retroperitoneal hemorrhage.  Multilevel degenerative disc disease throughout the lumbar spine with  endplate and facet hypertrophic changes.  Mild osteoarthritis at the right and left hip joint.  Distended configuration to large bowel segments throughout the abdomen  and pelvis with multiple air-fluid levels suggestive of diffuse ileus  versus underlying partial distal large bowel obstruction.  No herniated bowel segment.  No acute process seen in the uterus.  No acute process seen in the bladder.       Impression:         1.  Multiple distended large bowel segments with air-fluid levels  throughout the colon suggestive of diffuse ileus versus underlying  partial distal large bowel obstruction.  2.  The appendix is not identified.  3.  Cholelithiasis.  4.  Small volume of ascites in the abdomen extending to the upper right  and left colic gutter.  5.  Small bilateral pleural effusions, left greater than right with  compressive atelectasis at the lung bases.  6.  Right lower lobe calcified granuloma.  7.  Calcified granulomas in the spleen.  8.  Multilevel degenerative disc disease throughout the lumbar spine  with endplate and facet hypertrophic changes.  9.  No abdominal aortic aneurysm or  retroperitoneal hemorrhage.  10.  No free air, abscess, or hematoma.     This report was finalized on 12/22/2023 6:28 PM by Jaden Stephens MD.       MRI Brain Without Contrast [[id number]] Collected: 12/22/23 1507     Updated: 12/22/23 1512    Narrative:      EXAM:    MR Head Without Intravenous Contrast     EXAM DATE:    12/22/2023 2:24 PM     CLINICAL HISTORY:    stroke protocol     TECHNIQUE:    Magnetic resonance images of the head/brain without intravenous  contrast in multiple planes.     COMPARISON:    No relevant prior studies available.     FINDINGS:    BRAIN AND EXTRA-AXIAL SPACES:  Abnormal T2 signal in the deep cerebral  white matter is consistent with small vessel ischemic/degenerative  changes.  The cerebral and cerebellar sulci are prominent consistent  with brain atrophy.  No hemorrhage.    BONES/JOINTS:  Unremarkable as visualized.    SINUSES:  Unremarkable as visualized.  No acute sinusitis.    MASTOID AIR CELLS:  Unremarkable as visualized.  No mastoid effusion.    ORBITS:  Unremarkable as visualized.       Impression:      1.  Small vessel ischemic/degenerative changes.  2.  Cerebral and cerebellar atrophy.        This report was finalized on 12/22/2023 3:10 PM by Dr. Harry Gross MD.       XR Chest 1 View [[id number]] Collected: 12/22/23 1420     Updated: 12/22/23 1422    Narrative:      EXAM:    XR Chest, 1 View     EXAM DATE:    12/22/2023 1:37 PM     CLINICAL HISTORY:    Acute Stroke Protocol (onset < 12 hrs)     TECHNIQUE:    Frontal view of the chest.     COMPARISON:    No relevant prior studies available.     FINDINGS:    LUNGS AND PLEURAL SPACES:  Left lower lobe linear atelectasis or  minimal airspace opacity noted.  Coarsened interstitial markings noted  throughout the lungs.  No pneumothorax.    HEART:  Unremarkable as visualized.  No cardiomegaly.    MEDIASTINUM:  Unremarkable as visualized.    BONES/JOINTS:  Unremarkable as visualized.       Impression:        Left lower lobe linear  atelectasis or minimal airspace opacity noted.        This report was finalized on 12/22/2023 2:20 PM by Dr. Harry Gross MD.       CT CEREBRAL PERFUSION WITH & WITHOUT CONTRAST [[id number]] Collected: 12/22/23 1307     Updated: 12/22/23 1310    Narrative:      EXAM:    CT Brain Perfusion Without and With Intravenous Contrast, [email or web address]  Protocol     EXAM DATE:    12/22/2023 1:03 PM     CLINICAL HISTORY:    Neuro deficit, acute, stroke suspected     TECHNIQUE:    Axial computed tomography images of the brain without and with  intravenous contrast using cerebral perfusion protocol.  Post-processing  parametric maps were created using [email or web address] software and reviewed.   Sagittal and coronal reformatted images were created and reviewed.  This  CT exam was performed using one or more of the following dose reduction  techniques:  automated exposure control, adjustment of the mA and/or kV  according to patient size, and/or use of iterative reconstruction  technique.     COMPARISON:    None.     FINDINGS:    ARTERIAL INPUT FUNCTION:  Optimal.    ESTIMATED INFARCT CORE, RCBF < 30%: 0    PERFUSION, TMAX > 6S:  12.    PERFUSION, TMAX > 10S:  0.    MISMATCH VOLUME:  0.    MISMATCH RATIO:  N/A.       BRAIN AND EXTRA-AXIAL SPACES:  No intracranial hemorrhage.       Impression:      No core infarct detected.     This report was finalized on 12/22/2023 1:08 PM by Dr. Harry Gross MD.       CT Angiogram Neck [[id number]] Collected: 12/22/23 1306     Updated: 12/22/23 1309    Narrative:      EXAM:    CT Angiography Neck With Intravenous Contrast     EXAM DATE:    12/22/2023 1:02 PM     CLINICAL HISTORY:    Stroke, follow up     TECHNIQUE:    Axial computed tomographic angiography images of the neck with  intravenous contrast.  This CT exam was performed using one or more of  the following dose reduction techniques:  automated exposure control,  adjustment of the mA and/or kV according to patient size, and/or use of  iterative  reconstruction technique.    MIP reconstructed images were created and reviewed.     COMPARISON:    None.     FINDINGS:      VASCULATURE:    RIGHT COMMON CAROTID ARTERY:  Unremarkable as visualized.  No  occlusion or significant stenosis.  No dissection.    RIGHT INTERNAL CAROTID ARTERY:  Mild bilateral ICA atherosclerotic  calcific stenosis.  No dissection.    RIGHT EXTERNAL CAROTID ARTERY:  Unremarkable as visualized.  No  occlusion.    RIGHT VERTEBRAL ARTERY:  Unremarkable as visualized.  No occlusion or  significant stenosis.  No dissection.       LEFT COMMON CAROTID ARTERY:  Unremarkable as visualized.  No occlusion  or significant stenosis.  No dissection.    LEFT INTERNAL CAROTID ARTERY:  See above.    LEFT EXTERNAL CAROTID ARTERY:  Unremarkable as visualized.  No  occlusion.    LEFT VERTEBRAL ARTERY:  The cervical portion of the left vertebral  artery may be occluded or very diminutive.  No occlusion or significant  stenosis.      NECK:    BONES/JOINTS:  Unremarkable as visualized.    SOFT TISSUES:  Unremarkable as visualized.    LUNG APICES:  Clear.      CAROTID STENOSIS REFERENCE USING NASCET CRITERIA:    % ICA stenosis = (1 - narrowest ICA diameter/diameter of distal  cervical ICA) x 100.    Mild - <50% stenosis.    Moderate - 50-69% stenosis.    Severe - 70-94% stenosis.    Near occlusion - 95-99% stenosis.    Occluded - 100% stenosis.       Impression:      1.  The cervical portion of the left vertebral artery may be occluded or  very diminutive.  2.  Mild bilateral ICA atherosclerotic calcific stenosis.        This report was finalized on 12/22/2023 1:07 PM by Dr. Harry Gross MD.       CT Angiogram Head w AI Analysis of LVO [[id number]] Collected: 12/22/23 1304     Updated: 12/22/23 1308    Narrative:      EXAM:    CT Angiography Head With Intravenous Contrast     EXAM DATE:    12/22/2023 1:01 PM     CLINICAL HISTORY:    Stroke, follow up     TECHNIQUE:    Axial computed tomographic angiography images  of the head with  intravenous contrast. LVO analysis was performed with [email or web address] software.   This CT exam was performed using one or more of the following dose  reduction techniques:  automated exposure control, adjustment of the mA  and/or kV according to patient size, and/or use of iterative  reconstruction technique.    MIP reconstructed images were created and reviewed.     COMPARISON:    No relevant prior studies available.     FINDINGS:    RIGHT INTERNAL CAROTID ARTERY:  No acute findings.  Intracranial  segment is patent with no significant stenosis.  No aneurysm.    RIGHT ANTERIOR CEREBRAL ARTERY:  Unremarkable as visualized.  No  occlusion or significant stenosis.  No aneurysm.    RIGHT MIDDLE CEREBRAL ARTERY:  Unremarkable as visualized.  No  occlusion or significant stenosis.  No aneurysm.    RIGHT POSTERIOR CEREBRAL ARTERY:  Unremarkable as visualized.  No  occlusion or significant stenosis.  No aneurysm.    RIGHT VERTEBRAL ARTERY:  Unremarkable as visualized.       LEFT INTERNAL CAROTID ARTERY:  No acute findings.  Intracranial  segment is patent with no significant stenosis.  No aneurysm.    LEFT ANTERIOR CEREBRAL ARTERY:  Unremarkable as visualized.  No  occlusion or significant stenosis.  No aneurysm.    LEFT MIDDLE CEREBRAL ARTERY:  Unremarkable as visualized.  No  occlusion or significant stenosis.  No aneurysm.    LEFT POSTERIOR CEREBRAL ARTERY:  Unremarkable as visualized.  No  occlusion or significant stenosis.  No aneurysm.    LEFT VERTEBRAL ARTERY:  Unremarkable as visualized.       BASILAR ARTERY:  Unremarkable as visualized.  No occlusion or  significant stenosis.  No aneurysm.       Impression:        No acute findings in the arteries of the head/brain.        This report was finalized on 12/22/2023 1:06 PM by Dr. Harry Gross MD.       CT Head Without Contrast Stroke Protocol [[id number]] Collected: 12/22/23 1302     Updated: 12/22/23 1305    Narrative:      EXAM:    CT Head Without  Intravenous Contrast     EXAM DATE:    12/22/2023 12:29 PM     CLINICAL HISTORY:    Neuro deficit, acute stroke suspected     TECHNIQUE:    Axial computed tomography images of the head/brain without intravenous  contrast.  Sagittal and coronal reformatted images were created and  reviewed.  This CT exam was performed using one or more of the following  dose reduction techniques:  automated exposure control, adjustment of  the mA and/or kV according to patient size, and/or use of iterative  reconstruction technique.     COMPARISON:    No relevant prior studies available.     FINDINGS:    BRAIN AND EXTRA-AXIAL SPACES:  Areas of decreased attenuation in the  deep cerebral white matter are consistent with small vessel  ischemic/degenerative changes.  No hemorrhage.    BONES/JOINTS:  Unremarkable as visualized.  No acute fracture.    SOFT TISSUES:  Unremarkable as visualized.    SINUSES:  Unremarkable as visualized.  No acute sinusitis.    MASTOID AIR CELLS:  Unremarkable as visualized.  No mastoid effusion.       Impression:        Small vessel ischemic/degenerative changes.        This report was finalized on 12/22/2023 1:03 PM by Dr. Harry Gross MD.             I have personally reviewed the above radiology results.     Last Echocardiogram:    ---------------------------------------------------------------------------------------------------------------------    Assessment & Plan      Active Hospital Problems    Diagnosis  POA    **Acute encephalopathy [G93.40]  Yes     Acute metabolic encephalopathy, POA  Acute Urinary Tract Infection, POA  History of VRE  Imaging of head unremarkable.  UA showed ketones, WBCs, bacteria  Continue Ceftriaxone and linezolid due to recent VRE, follow up cultures  Patient does not  meet sepsis criteria at this time. Continue to monitor vital signs.  Bowel Obstruction, POA   Cholelithiasis without cholecystitis, POA  Ascites, POA  Transaminitis, POA   Hyperbilirubinemia, POA  CT  abdomen/pelvis shows dilated loops of bowel, ileus versus large bowel obstruction.  Also notes small volume ascites.  Gallbladder ultrasound and MRCP showed cholelithiasis without evidence of cholecystitis or choledocholithiasis.  Patient recently had dilated bowel on admission in November at [facility].  At that time it was thought to be due to Anselmo's.  General surgery consulted while in ED, agrees to consult patient on admission.  Assistance appreciated  Slightly elevated transaminases.  Hepatitis panel negative.  Acetaminophen level negative.    Acute Kidney Injury   Baseline Cr 0.76 based on labs from last admission at [facility], was up to 1.40 on admission  Continue mIVFs, Trend Cr and urine output, avoid nephrotoxins, NSAIDs, dehydration and contrast as able.  Repeat BMP in the a.m.     Elevated troponin, POA  Likely secondary to ANGELICA   Initial HS troponin 25, elevated creatinine, repeat 27  Obtain updated TTE in the a.m  continuous cardiac monitoring    Abnormal uterine bleeding  Patient had transvaginal ultrasound on 11/27/23 was significant for abnormal thickening of endometrium.  Endometrial carcinoma cannot be excluded.  At that time patient was not interested in workup and would not pursue any treatment.    CHRONIC MEDICAL PROBLEMS    Atrial fibrillation  History of pulmonary embolism  History of DVT  Blood clotting disorder  Restart home anticoagulation and rate control medicine pending formal pharmacy med rec  Continuous cardiac monitoring  Combined systolic and diastolic heart failure  Dilated cardiomyopathy   Pulmonary hypertension  TTE in the a.m.. Monitor daily weights and strict I's and O's. Monitor for signs symptoms of volume overload    Essential hypertension  BP appears well controlled   Plan to resume home antihypertensive regimen once reconciled per pharmacy with appropriate holding parameters to prevent hypotension and/or bradycardia   Closely monitor BP per hospital  protocol, titrate medications as necessary    F/E/N: IV NS 75 mL/h.  Continue monitor electrolytes.  NPO.    ---------------------------------------------------  DVT Prophylaxis: Home anticoagulation  Activity: Up with assistance    The patient is considered to be a high risk patient due to: Encephalopathy, bowel obstruction, advanced age    INPATIENT status due to the need for care which can only be reasonably provided in an hospital setting such as aggressive/expedited ancillary services and/or consultation services, the necessity for IV medications, close physician monitoring and/or the possible need for procedures.  In such, I feel patient’s risk for adverse outcomes and need for care warrant INPATIENT evaluation and predict the patient’s care encounter to likely last beyond 2 midnights.      Code Status: Full CODE     Disposition/Discharge planning: Pending clinical course.  Likely back to nursing home on discharge.    I have discussed the patient's assessment and plan with Dr. Vaughan.      Georgette Briseno PA-C  Hospitalist Service -- [facility]       12/23/23  02:57 EST    Attending Physician: Gustavo Vaughan MD

## 2023-12-23 NOTE — PROGRESS NOTES
Pharmacy was consulted to dose linezolid for an urinary tract infection in the patient with a recent history of VRE UTI. Based on an estimated CrCl of 32mL/min, a linezolid dose of 600mg q12hr has been ordered. Thank you for the consult.     Thank you,   [place], PharmD  00:22 EST

## 2023-12-24 NOTE — PLAN OF CARE
Problem: Adult Inpatient Plan of Care  Goal: Absence of Hospital-Acquired Illness or Injury  Intervention: Prevent Skin Injury  Recent Flowsheet Documentation  Taken 12/23/2023 1928 by Lizett Kelley RN  Body Position: supine  Skin Protection:   adhesive use limited   incontinence pads utilized     Problem: Adult Inpatient Plan of Care  Goal: Absence of Hospital-Acquired Illness or Injury  Intervention: Prevent and Manage VTE (Venous Thromboembolism) Risk  Recent Flowsheet Documentation  Taken 12/23/2023 1928 by Lizett Kelley, RN  Activity Management: bedrest   Goal Outcome Evaluation:

## 2023-12-24 NOTE — PROGRESS NOTES
[facility] HOSPITALIST PROGRESS NOTE     Patient Identification:  Name:  Cristina Hughes  Age:  83 y.o.  Sex:  female  :  1940  MRN:  [id number]  Visit Number:  [id number]  ROOM: [address] [place] [address]     Primary Care Provider:  Sunshine Prescott PA    Length of stay in inpatient status:  1    Subjective     Chief Compliant:    Chief Complaint   Patient presents with    Altered Mental Status       History of Presenting Illness:    Patient seen and examined this morning, no family present at bedside. She reports feeling better than yesterday, but was a poor historian and not able to describe why she had been feeling poorly. She denied shortness of breath or pain anywhere.    Objective     Current Hospital Meds:apixaban, 5 mg, Oral, Q12H  aspirin, 81 mg, Oral, Daily  cefTRIAXone, 1,000 mg, Intravenous, Q24H  [START ON 2023] digoxin, 125 mcg, Oral, Once per day on   Linezolid, 600 mg, Intravenous, Q12H  metoprolol tartrate, 50 mg, Oral, Q12H  miconazole, 1 application , Topical, Q12H  senna-docusate sodium, 2 tablet, Oral, BID  sodium chloride, 10 mL, Intravenous, Q12H    sodium chloride, 50 mL/hr, Last Rate: 75 mL/hr (23 1017)        Current Antimicrobial Therapy:  Anti-Infectives (From admission, onward)      Ordered     Dose/Rate Route Frequency Start Stop    23 0016  cefTRIAXone (ROCEPHIN) 1,000 mg in sodium chloride 0.9 % 100 mL IVPB-VTB        Ordering Provider: Gustavo Vaughan MD    1,000 mg  200 mL/hr over 30 Minutes Intravenous Every 24 Hours 23 1500 23 1459    23 0020  Linezolid (ZYVOX) 600 mg 300 mL        Ordering Provider: Gustavo Vaughan MD    600 mg  300 mL/hr over 60 Minutes Intravenous Every 12 Hours 23 0100 23 0059    23 1454  cefTRIAXone (ROCEPHIN) 1,000 mg in sodium chloride 0.9 % 100 mL IVPB-VTB        Ordering Provider: Milo Narayanan DO    1,000 mg  200 mL/hr over 30 Minutes Intravenous Once 23 1517  12/22/23 1652    12/22/23 1736  cephalexin (KEFLEX) 500 MG capsule        Ordering Provider: Milo Narayanan DO    500 mg Oral 2 Times Daily 12/22/23 0000            Current Diuretic Therapy:  Diuretics (From admission, onward)      None          ----------------------------------------------------------------------------------------------------------------------  Vital Signs:  Temp:  [97.6 °F (36.4 °C)-98.5 °F (36.9 °C)] 97.9 °F (36.6 °C)  Heart Rate:  [] 82  Resp:  [20] 20  BP: ()/(50-62) 108/50  SpO2:  [96 %-99 %] 96 %  on  Flow (L/min):  [2-3] 3;   Device (Oxygen Therapy): nasal cannula  Body mass index is 40.23 kg/m².    Wt Readings from Last 3 Encounters:   12/24/23 99.8 kg (220 lb)   12/22/23 98.4 kg (217 lb)     Intake & Output (last 3 days)         12/21 0701 12/22 0700 12/22 0701 12/23 0700 12/23 0701 12/24 0700 12/24 0701  12/25 0700    P.O.  150      Total Intake(mL/kg)  150 (1.5)      Urine (mL/kg/hr)   0 (0)     Stool   0     Total Output   0     Net  +150 0             Urine Unmeasured Occurrence  2 x 3 x     Stool Unmeasured Occurrence  2 x 3 x           NPO Diet NPO Type: Strict NPO  ----------------------------------------------------------------------------------------------------------------------  Physical exam:   Constitutional:  Well-developed and well-nourished.  No acute distress.      HENT:  Head:  Normocephalic and atraumatic.   Cardiovascular:  Normal rate, regular rhythm   Pulmonary/Chest:  No respiratory distress, breath sounds clear to auscultation bilaterally  Abdominal:  Soft. No distension and no tenderness.   Musculoskeletal:  No deformity.     Neurological: Awake, alert, no focal deficit on gross examination. No slurred speech or facial droop.   Skin:  Skin is warm and dry.   Peripheral vascular:  No cyanosis, trace lower extremity edema bilaterally.  Psychiatric: Appropriate mood and affect  Edited by: Yvonne Medrano DO at 12/24/2023  "1503  ----------------------------------------------------------------------------------------------------------------------  Results from last 7 days   Lab Units 12/24/23  0701 12/23/23  0211 12/23/23  0156 12/22/23  2201 12/22/23  1844 12/22/23  1626 12/22/23  1605 12/22/23  1239   CRP mg/dL  --   --   --   --   --  3.26*  --   --    LACTATE mmol/L  --   --  2.0 2.2* 2.4*  --    < >  --    WBC 10*3/mm3 8.52 9.70  --   --   --   --   --  11.09*   HEMOGLOBIN g/dL 10.9* 10.9*  --   --   --   --   --  11.2*   HEMATOCRIT % 32.7* 34.4  --   --   --   --   --  34.0   MCV fL 96.5 100.6*  --   --   --   --   --  97.4*   MCHC g/dL 33.3 31.7  --   --   --   --   --  32.9   PLATELETS 10*3/mm3 126* 151  --   --   --   --   --  172   INR   --   --   --   --   --   --   --  2.81*    < > = values in this interval not displayed.         Results from last 7 days   Lab Units 12/24/23  0701 12/23/23 0211 12/22/23  1626 12/22/23  1240 12/22/23  1239   SODIUM mmol/L 136 137  --   --  135*   POTASSIUM mmol/L 3.1* 3.4*  --   --  4.0   CHLORIDE mmol/L 102 99  --   --  99   CO2 mmol/L 22.5 21.4*  --   --  24.9   BUN mg/dL 29* 41*  --   --  40*   CREATININE mg/dL 1.08* 1.29*  --  1.40* 1.36*   CALCIUM mg/dL 8.3* 8.9  --   --  8.8   GLUCOSE mg/dL 83 99  --   --  109*   ALBUMIN g/dL  --  2.9*  --   --  2.9*   BILIRUBIN mg/dL  --  1.5* 1.6*  --  1.7*   ALK PHOS U/L  --  335*  --   --  357*   AST (SGOT) U/L  --  74*  --   --  86*   ALT (SGPT) U/L  --  45*  --   --  48*   Estimated Creatinine Clearance: 43.6 mL/min (A) (by C-G formula based on SCr of 1.08 mg/dL (H)).  Ammonia   Date Value Ref Range Status   12/22/2023 21 11 - 51 umol/L Final     Results from last 7 days   Lab Units 12/22/23  1626 12/22/23  1424   HSTROP T ng/L 27* 25*             Glucose   Date/Time Value Ref Range Status   12/22/2023 1235 87 70 - 130 mg/dL Final     No results found for: \"TSH\", \"FREET4\"  No results found for: \"PREGTESTUR\", \"PREGSERUM\", \"HCG\", \"HCGQUANT\"  Pain " "Management Panel           No data to display              Brief Urine Lab Results  (Last result in the past 365 days)        Color   Clarity   Blood   Leuk Est   Nitrite   Protein   CREAT   Urine HCG        12/22/23 1317 Yellow   Cloudy   Moderate (2+)   Moderate (2+)   Negative   Negative                 Blood Culture   Date Value Ref Range Status   12/22/2023 No growth at 24 hours  Preliminary   12/22/2023 No growth at 24 hours  Preliminary     Urine Culture   Date Value Ref Range Status   12/22/2023 Yeast isolated (A)  Final     Comment:     No further workup.     No results found for: \"WOUNDCX\"  No results found for: \"STOOLCX\"  No results found for: \"RESPCX\"  No results found for: \"AFBCX\"  Results from last 7 days   Lab Units 12/23/23  0156 12/22/23  2201 12/22/23  1844 12/22/23  1626 12/22/23  1605   PROCALCITONIN ng/mL 0.14  --   --   --   --    LACTATE mmol/L 2.0 2.2* 2.4*  --  2.9*   CRP mg/dL  --   --   --  3.26*  --        I have personally looked at the labs and they are summarized above.  ----------------------------------------------------------------------------------------------------------------------  Detailed radiology reports for the last 24 hours:  Imaging Results (Last 24 Hours)       ** No results found for the last 24 hours. **          Assessment & Plan      # Acute metabolic encephalopathy  # Acute urinary tract infection  # History of VRE  - Encephalopathy improving  -Continue ceftriaxone and linezolid pending culture data, since she had recent VRE infection  -CBC in a.m.    # Suspected partial small bowel obstruction  # Cholelithiasis without cholecystitis  # Small amount of ascites  # Transaminitis, mild  # Mild direct hyperbilirubinemia  -General surgery following, appreciate assistance  -Monitor hepatic function with repeat CMP in a.m.  -Abdominal exam is benign.  Awaiting barium enema which was ordered by general surgery.  -Patient remains n.p.o. at this time as she was too somnolent " yesterday to participate in speech therapist evaluation.  Now that she is much more awake could consider nursing bedside swallow evaluation while awaiting speech therapy services availability after the holiday.    # Acute kidney injury, improving  -Creatinine baseline appears to be 0.76.  Creatinine was up to 1.4 on admission, and has improved to 1.08 today.  -Continue maintenance IV fluids, but decreased to 50 cc/h as she is developing some mild lower extremity edema.  If she demonstrates worsening fluid overload then we will hold further IV fluids.  -Monitor intake and output, daily weights  -Avoid nephrotoxins as able    # Minimally elevated troponin with flat trend and no reports of chest pain  -Likely due to acute kidney injury  -Echo shows left ventricular EF 66 to 70%, left ventricular diastolic function consistent with age, mild pulmonary hypertension  -Monitor on telemetry    #Hypokalemia  - K+ level 3.1 today  - Replace with IV potassium supplement until cleared for oral intake  - Repeat chemistry panel in a.m.  - Obtain magnesium level to rule out concomitant hypomagnesemia    # Abnormal uterine bleeding  - Patient had transvaginal ultrasound on 11/27/23 which was significant for abnormal thickening of endometrium.  Endometrial carcinoma cannot be excluded.  At that time patient was not interested in workup and would not pursue any treatment.  Recommend outpatient follow-up.    Chronic medical conditions    # A-fib  # History of pulmonary embolism  # History of DVT  # Blood clotting disorder  -As no surgical intervention appears to be planned at this time we will restart her home Eliquis and aspirin  -On telemetry    # History of combined systolic and diastolic heart failure  # History of dilated cardiomyopathy  # Pulmonary hypertension  -Echo results reviewed as noted above showing normal left ventricular EF and diastolic function consistent with age and mild pulmonary hypertension    # Central  hypertension  -Pressure well-controlled.  Monitor vital signs per protocol      Edited by: Yvonne Medrano DO at 12/24/2023 1503    VTE Prophylaxis:   Mechanical Order History:       None          Pharmalogical Order History:        Ordered     Dose Route Frequency Stop    12/23/23 0401  apixaban (ELIQUIS) tablet 5 mg         5 mg PO Every 12 Hours Scheduled --                    Dispo: pending clinical progress    Yvonne Medrano DO  [facility]  12/24/23  15:03 EST

## 2023-12-25 NOTE — PROGRESS NOTES
[facility] HOSPITALIST PROGRESS NOTE     Patient Identification:  Name:  Cristina Hughes  Age:  83 y.o.  Sex:  female  :  1940  MRN:  [id number]  Visit Number:  [id number]  ROOM: [address] [place] [address]     Primary Care Provider:  Sunshine Prescott PA    Length of stay in inpatient status:  2    Subjective     Chief Compliant:    Chief Complaint   Patient presents with    Altered Mental Status       History of Presenting Illness:    Patient seen in follow-up for encephalopathy and presumptive urinary tract infection.  Hemodynamically stable earlier this morning, later this evening nursing reported hypotension although patient was lethargic, she was arousable and would curse occasionally and nursing with any questions that was asked.  Afebrile and denied any complaints.    Objective     Current Hospital Meds:apixaban, 5 mg, Oral, Q12H  aspirin, 81 mg, Oral, Daily  cefTRIAXone, 1,000 mg, Intravenous, Q24H  digoxin, 125 mcg, Oral, Once per day on   Linezolid, 600 mg, Intravenous, Q12H  metoprolol tartrate, 50 mg, Oral, Q12H  miconazole, 1 application , Topical, Q12H  senna-docusate sodium, 2 tablet, Oral, BID  sodium chloride, 1,000 mL, Intravenous, Once  sodium chloride, 10 mL, Intravenous, Q12H    sodium chloride, 50 mL/hr, Last Rate: 50 mL/hr (23 1620)        Current Antimicrobial Therapy:  Anti-Infectives (From admission, onward)      Ordered     Dose/Rate Route Frequency Start Stop    23 0016  cefTRIAXone (ROCEPHIN) 1,000 mg in sodium chloride 0.9 % 100 mL IVPB-VTB        Ordering Provider: Gustavo Vaughan MD    1,000 mg  200 mL/hr over 30 Minutes Intravenous Every 24 Hours 23 1500 23 1459    23 0020  Linezolid (ZYVOX) 600 mg 300 mL        Ordering Provider: Gustavo Vaughan MD    600 mg  300 mL/hr over 60 Minutes Intravenous Every 12 Hours 23 0100 23 0059    23 1454  cefTRIAXone (ROCEPHIN) 1,000 mg in sodium chloride 0.9 % 100 mL  IVPB-VTB        Ordering Provider: Milo Narayanan DO    1,000 mg  200 mL/hr over 30 Minutes Intravenous Once 12/22/23 1510 12/22/23 1652    12/22/23 1736  cephalexin (KEFLEX) 500 MG capsule        Ordering Provider: Milo Narayanan DO    500 mg Oral 2 Times Daily 12/22/23 0000            Current Diuretic Therapy:  Diuretics (From admission, onward)      None          ----------------------------------------------------------------------------------------------------------------------  Vital Signs:  Temp:  [97.9 °F (36.6 °C)-98.7 °F (37.1 °C)] 97.9 °F (36.6 °C)  Heart Rate:  [74-97] 96  Resp:  [18-20] 18  BP: ()/(42-90) 86/52  SpO2:  [94 %-98 %] 97 %  on  Flow (L/min):  [3] 3;   Device (Oxygen Therapy): nasal cannula  Body mass index is 41.98 kg/m².    Wt Readings from Last 3 Encounters:   12/25/23 104 kg (229 lb 9.6 oz)   12/22/23 98.4 kg (217 lb)     Intake & Output (last 3 days)         12/22 0701 12/23 0700 12/23 0701 12/24 0700 12/24 0701  12/25 0700 12/25 0701  12/26 0700    P.O. 150   0    Total Intake(mL/kg) 150 (1.5)   0 (0)    Urine (mL/kg/hr)  0 (0)      Stool  0      Total Output  0      Net +150 0  0            Urine Unmeasured Occurrence 2 x 4 x 5 x 1 x    Stool Unmeasured Occurrence 2 x 4 x 4 x 1 x          Diet: Regular/House Diet; Texture: Pureed (NDD 1); Fluid Consistency: Thin (IDDSI 0)  ----------------------------------------------------------------------------------------------------------------------  Physical exam:  Constitutional: Elderly, chronically ill-appearing female, resting comfortably in bed, no acute distress.      HENT:  Head:  Normocephalic and atraumatic.  Mouth:  Moist mucous membranes.    Eyes:  Conjunctivae and EOM are normal. No scleral icterus.   Cardiovascular:  Normal rate, regular rhythm and normal heart sounds with no murmur. No JVD.   Pulmonary/Chest:  No respiratory distress, no wheezes, no crackles, with normal breath sounds and good air movement. Unlabored.  No accessory muscle use.  Abdominal:  Soft. No distension and no tenderness.  Bowel sounds present. No rebound or guarding.   Musculoskeletal:  No tenderness, and no deformity.  No red or swollen joints anywhere.    Neurological:  No cranial nerve deficit.   Nonfocal.   Skin:  Skin is warm and dry. No rash noted. No pallor.   Peripheral vascular:  No clubbing, no cyanosis, no edema. Pedal and tibial pulses 2 out of 4 bilaterally.     ----------------------------------------------------------------------------------------------------------------------  Results from last 7 days   Lab Units 12/25/23  0057 12/24/23  0701 12/23/23  0211 12/23/23  0156 12/22/23  2201 12/22/23  1844 12/22/23  1626 12/22/23  1605 12/22/23  1239   CRP mg/dL  --   --   --   --   --   --  3.26*  --   --    LACTATE mmol/L  --   --   --  2.0 2.2* 2.4*  --    < >  --    WBC 10*3/mm3 8.74 8.52 9.70  --   --   --   --   --  11.09*   HEMOGLOBIN g/dL 10.1* 10.9* 10.9*  --   --   --   --   --  11.2*   HEMATOCRIT % 31.2* 32.7* 34.4  --   --   --   --   --  34.0   MCV fL 99.0* 96.5 100.6*  --   --   --   --   --  97.4*   MCHC g/dL 32.4 33.3 31.7  --   --   --   --   --  32.9   PLATELETS 10*3/mm3 132* 126* 151  --   --   --   --   --  172   INR   --   --   --   --   --   --   --   --  2.81*    < > = values in this interval not displayed.         Results from last 7 days   Lab Units 12/25/23  0057 12/24/23  1511 12/24/23  0701 12/23/23  0211 12/22/23  1626 12/22/23  1240 12/22/23  1239   SODIUM mmol/L 139  --  136 137  --   --  135*   POTASSIUM mmol/L 3.6  --  3.1* 3.4*  --   --  4.0   MAGNESIUM mg/dL 2.3 2.4  --   --   --   --   --    CHLORIDE mmol/L 104  --  102 99  --   --  99   CO2 mmol/L 26.0  --  22.5 21.4*  --   --  24.9   BUN mg/dL 30*  --  29* 41*  --   --  40*   CREATININE mg/dL 1.22*  --  1.08* 1.29*  --    < > 1.36*   CALCIUM mg/dL 8.5*  --  8.3* 8.9  --   --  8.8   GLUCOSE mg/dL 97  --  83 99  --   --  109*   ALBUMIN g/dL  --   --   --  2.9*   "--   --  2.9*   BILIRUBIN mg/dL  --   --   --  1.5* 1.6*  --  1.7*   ALK PHOS U/L  --   --   --  335*  --   --  357*   AST (SGOT) U/L  --   --   --  74*  --   --  86*   ALT (SGPT) U/L  --   --   --  45*  --   --  48*    < > = values in this interval not displayed.   Estimated Creatinine Clearance: 39.5 mL/min (A) (by C-G formula based on SCr of 1.22 mg/dL (H)).  Ammonia   Date Value Ref Range Status   12/22/2023 21 11 - 51 umol/L Final     Results from last 7 days   Lab Units 12/22/23  1626 12/22/23  1424   HSTROP T ng/L 27* 25*             No results found for: \"HGBA1C\", \"POCGLU\"  No results found for: \"TSH\", \"FREET4\"  No results found for: \"PREGTESTUR\", \"PREGSERUM\", \"HCG\", \"HCGQUANT\"  Pain Management Panel           No data to display              Brief Urine Lab Results  (Last result in the past 365 days)        Color   Clarity   Blood   Leuk Est   Nitrite   Protein   CREAT   Urine HCG        12/22/23 1317 Yellow   Cloudy   Moderate (2+)   Moderate (2+)   Negative   Negative                 Blood Culture   Date Value Ref Range Status   12/22/2023 No growth at 2 days  Preliminary   12/22/2023 No growth at 2 days  Preliminary     Urine Culture   Date Value Ref Range Status   12/22/2023 Yeast isolated (A)  Final     Comment:     No further workup.     No results found for: \"WOUNDCX\"  No results found for: \"STOOLCX\"  No results found for: \"RESPCX\"  No results found for: \"AFBCX\"  Results from last 7 days   Lab Units 12/23/23  0156 12/22/23  2201 12/22/23  1844 12/22/23  1626 12/22/23  1605   PROCALCITONIN ng/mL 0.14  --   --   --   --    LACTATE mmol/L 2.0 2.2* 2.4*  --  2.9*   CRP mg/dL  --   --   --  3.26*  --        I have personally looked at the labs and they are summarized above.  ----------------------------------------------------------------------------------------------------------------------  Detailed radiology reports for the last 24 hours:  Imaging Results (Last 24 Hours)       ** No results found for " the last 24 hours. **          Assessment & Plan      Patient is an 83-year-old female with history significant for PE, dilated cardiomyopathy, atrial fibrillation with pulmonary hypertension who presented to the ER with reports of AMS per family.    #Acute metabolic encephalopathy  #Sepsis d/t acute urinary tract infection, POA  --Patient presented with reports of AMS per family, recently admitted to [facility] on 11/26 due to weakness and AMS-at that time was diagnosed with VRE and completed a course of oral Zyvox.  At that time also found to have a distended colon felt to be consistent with [place]'s  --ABG unremarkable on 3 L  --CT head negative  --CT angios without evidence of LVO  --MRI brain cerebellar and cerebral atrophy  --Neurology consulted, signed off  -- Patient became hypotensive throughout the day on 12/25, responsive to sternal rub and would become verbally abusive to nursing staff  --Labs repeated, ABG unremarkable  --Urine culture yeast, contaminant  --Bladder scan negative  --1 L bolus normal saline  --Continue empiric Zyvox (history of VRE), Rocephin, follow-up on culture data-likely falsely negative given recent antibiotic use, unclear if this was treated completely or partially  --Continue to monitor on telemetry    #Ileus versus partial large bowel obstruction  #Acute hepatitis  #Direct hyperbilirubinemia  --CT abdomen/pelvis noted multiple distended large bowel segments consistent of ileus versus partial distal large bowel obstruction, small volume of ascites  --MRCP without any features of choledocholithiasis, cholelithiasis noted  --Initial elevation in transaminases and direct hyperbilirubinemia, but these are currently improving  --Acute hepatitis panel negative  --No nausea or vomiting or GI symptoms  --Multiple bowel movements her last several days, no evidence of ileus or SBO, general surgery consulted, signed off    #Cardiomyopathy  #Essential hypertension  #Atrial  fibrillation  --Rate controlled, documented cardiomyopathy both systolic/diastolic however echo only notes diastolic dysfunction, none previous on file  --Echo noted EF is 66 to 70%, mild pulmonary hypertension  --Continue Eliquis for stroke prophylaxis  --Continue Lopressor and dig with holding parameters  --Follow-up with cardiology outpatient    #Moderate protein and albumin malnutrition  #Bilateral pleural effusions, left greater than right  #History of pulmonary embolism    CHECKLIST:  Abx: Zyvox, Rocephin  VTE: Eliquis  GI ppx: None  Diet: Consistent carb  Code: CPR, full  Dispo: Patient is high risk due to encephalopathy, sepsis due to suspected urinary tract infection.  Anticipate greater than 2 midnight stay.  Dispo Home versus SNF when stable.    Kirill Fernandez DO  [facility] Hospitalist  12/25/23  15:59 EST

## 2023-12-25 NOTE — PLAN OF CARE
Goal Outcome Evaluation:  Plan of Care Reviewed With: patient        Progress: improving          Patient has been resting this shift. No s/s of acute distress noted. Will continue to follow plan of care.

## 2023-12-25 NOTE — PLAN OF CARE
Problem: Adult Inpatient Plan of Care  Goal: Absence of Hospital-Acquired Illness or Injury  Intervention: Prevent Skin Injury  Recent Flowsheet Documentation  Taken 12/24/2023 1931 by Lizett Kelley RN  Body Position: supine  Skin Protection:   adhesive use limited   incontinence pads utilized     Problem: Adult Inpatient Plan of Care  Goal: Absence of Hospital-Acquired Illness or Injury  Intervention: Prevent and Manage VTE (Venous Thromboembolism) Risk  Recent Flowsheet Documentation  Taken 12/24/2023 1931 by Lizett Kelley, RN  Activity Management: bedrest  VTE Prevention/Management: (See MAR) other (see comments)   Goal Outcome Evaluation:

## 2023-12-26 NOTE — THERAPY EVALUATION
Patient Name: Cristina Hughes  : 1940    MRN: [id number]                              Today's Date: 2023       Admit Date: 2023    Visit Dx:     ICD-10-CM ICD-9-CM   1. Acute UTI  N39.0 599.0   2. Altered mental status, unspecified altered mental status type  R41.82 780.97     Patient Active Problem List   Diagnosis    Acute encephalopathy     Past Medical History:   Diagnosis Date    Atrial fibrillation     CHF (congestive heart failure)     Hypertension      History reviewed. No pertinent surgical history.   General Information       Row Name 23 1514          OT Time and Intention    Document Type evaluation  -     Mode of Treatment individual therapy;occupational therapy  -       Row Name 23 1514          General Information    Patient Profile Reviewed yes  -     Prior Level of Function --  pt did not provide subjective history; per chart/staff, resides with son, she was able to feed self with set-up; pt with recent hospitalization at [facility]  and was in rehab at skilled nursing facility prior to current hospitalization  -     Existing Precautions/Restrictions fall  -     Barriers to Rehab previous functional deficit  -       Row Name 23 1514          Occupational Profile    Reason for Services/Referral (Occupational Profile) Patient was admitted to [facility] on 2023. She was referred for OT evaluation due to change in functional performance with ADLs, functional mobility, and/or transfers.  -       Row Name 23 1514          Living Environment    People in Home child(demarcus), adult  -       Row Name 23 1514          Cognition    Orientation Status (Cognition) unable/difficult to assess;refused to attempt  -       Row Name 23 1514          Safety Issues, Functional Mobility    Impairments Affecting Function (Mobility) strength;endurance/activity tolerance  -               User Key  (r) = Recorded By, (t) = Taken By, (c)  = Cosigned By      Initials Name Provider Type     Caroline Mulligan OT Occupational Therapist                     Mobility/ADL's       Row Name 12/26/23 1516          Bed Mobility    Bed Mobility bed mobility (all) activities  -     All Activities, [place] (Bed Mobility) dependent (less than 25% patient effort)  -[facility] Name 12/26/23 1516          Transfers    Comment, (Transfers) unable to safely attempt due to decreased participation with activities from supine level  -[facility] Name 12/26/23 1516          Activities of Daily Living    BADL Assessment/Intervention bathing;upper body dressing;lower body dressing;grooming;toileting  -[facility] Name 12/26/23 1516          Bathing Assessment/Intervention    [place] Level (Bathing) bathing skills;dependent (less than 25% patient effort)  -KP       Row Name 12/26/23 1516          Upper Body Dressing Assessment/Training    [place] Level (Upper Body Dressing) upper body dressing skills;dependent (less than 25% patient effort)  -[facility] Name 12/26/23 1516          Lower Body Dressing Assessment/Training    [place] Level (Lower Body Dressing) lower body dressing skills;dependent (less than 25% patient effort)  -[facility] Name 12/26/23 1516          Grooming Assessment/Training    [place] Level (Grooming) grooming skills;dependent (less than 25% patient effort)  -[facility] Name 12/26/23 1516          Toileting Assessment/Training    [place] Level (Toileting) toileting skills;dependent (less than 25% patient effort)  -               User Key  (r) = Recorded By, (t) = Taken By, (c) = Cosigned By      Initials Name Provider Type     Caroline Mulligan OT Occupational Therapist                   Obj/Interventions       [facility] Name 12/26/23 1517          Sensory Assessment (Somatosensory)    Sensory Assessment (Somatosensory) unable/difficult to assess  -KP       Row Name 12/26/23 1517          Vision Assessment/Intervention     Vision Assessment Comment opened eyes to name X1, able to focus on therapist appropriately while eyes open  -       Row Name 12/26/23 1517          Range of Motion Comprehensive    Comment, General Range of Motion AAROM/PROM WFLs  -       Row Name 12/26/23 1517          Strength Comprehensive (MMT)    Comment, General Manual Muscle Testing (MMT) Assessment proximally 1/5 in BUEs, poor plus grasp in bilateral hands  -       Row Name 12/26/23 1517          Motor Skills    Motor Skills functional endurance  -     Functional Endurance poor  -[facility] Name 12/26/23 1517          Balance    Comment, Balance unable to assess  -               User Key  (r) = Recorded By, (t) = Taken By, (c) = Cosigned By      Initials Name Provider Type     Caroline Mulligan, OT Occupational Therapist                   Goals/Plan       [facility] Name 12/26/23 1521          Transfer Goal 1 (OT)    Activity/Assistive Device (Transfer Goal 1, OT) toilet;commode, 3-in-1  -KP     [place] Level/Cues Needed (Transfer Goal 1, OT) maximum assist (25-49% patient effort)  -     Time Frame (Transfer Goal 1, OT) by discharge  -[facility] Name 12/26/23 1521          Bathing Goal 1 (OT)    Activity/Device (Bathing Goal 1, OT) bathing skills, all  -KP     [place] Level/Cues Needed (Bathing Goal 1, OT) maximum assist (25-49% patient effort)  -     Time Frame (Bathing Goal 1, OT) by discharge  -[facility] Name 12/26/23 1521          Strength Goal 1 (OT)    Strength Goal 1 (OT) Patient will demonstrate 2+/5 or better in BUEs to promote functional performance with daily routine.  -     Time Frame (Strength Goal 1, OT) by discharge  -[facility] Name 12/26/23 1521          Therapy Assessment/Plan (OT)    Planned Therapy Interventions (OT) activity tolerance training;BADL retraining;passive ROM/stretching;occupation/activity based interventions;strengthening exercise;ROM/therapeutic exercise;patient/caregiver education/training   -               User Key  (r) = Recorded By, (t) = Taken By, (c) = Cosigned By      Initials Name Provider Type     Caroline Mulligan, OT Occupational Therapist                   Clinical Impression       Row Name 12/26/23 1518          Pain Assessment    Pretreatment Pain Rating 0/10 - no pain  -     Posttreatment Pain Rating 0/10 - no pain  -       Row Name 12/26/23 1518          Plan of Care Review    Plan of Care Reviewed With patient  -     Progress no change  -     Outcome Evaluation Patient seen for OT evaluation. She was in semi-fowlers position and opened eyes X1. She was able to follow commands for strength and movement testing, but dependent for all other tasks with patient lying in bed with eyes closed. She did not speak verbally with therapist, but per other staff was observed and heard to speak with family earlier on this date. She would benefit from OT services by a licensed therapist to promote highest level of independence and safety to return to prior level of function and home with son.  -       Row Name 12/26/23 1518          Therapy Assessment/Plan (OT)    Patient/Family Therapy Goal Statement (OT) patient did not state  -     Rehab Potential (OT) fair, will monitor progress closely  motivation towards therapy  -     Criteria for Skilled Therapeutic Interventions Met (OT) yes;meets criteria;skilled treatment is necessary  [facility]     Therapy Frequency (OT) 3 times/wk  3-5x/week as able and available to promote functional progress  -     Predicted Duration of Therapy Intervention (OT) discharge  -       Row Name 12/26/23 1518          Therapy Plan Review/Discharge Plan (OT)    Anticipated Discharge Disposition (OT) skilled nursing facility  -       Row Name 12/26/23 1518          Positioning and Restraints    Pre-Treatment Position in bed  -     Post Treatment Position bed  -     In Bed fowlers;call light within reach;encouraged to call for assist;exit alarm on  -                User Key  (r) = Recorded By, (t) = Taken By, (c) = Cosigned By      Initials Name Provider Type    Caroline Bethea, OT Occupational Therapist                   Outcome Measures       Row Name 12/26/23 0369          How much help from another person do you currently need...    Turning from your back to your side while in flat bed without using bedrails? 1  -MW     Moving from lying on back to sitting on the side of a flat bed without bedrails? 1  -MW     Moving to and from a bed to a chair (including a wheelchair)? 1  -MW     Standing up from a chair using your arms (e.g., wheelchair, bedside chair)? 1  -MW     Climbing 3-5 steps with a railing? 1  -MW     To walk in hospital room? 1  -MW     AM-PAC 6 Clicks Score (PT) 6  -MW     Highest Level of Mobility Goal 2 --> Bed activities/dependent transfer  -MW               User Key  (r) = Recorded By, (t) = Taken By, (c) = Cosigned By      Initials Name Provider Type    Leona Watts, RN Registered Nurse                      OT Recommendation and Plan  Planned Therapy Interventions (OT): activity tolerance training, BADL retraining, passive ROM/stretching, occupation/activity based interventions, strengthening exercise, ROM/therapeutic exercise, patient/caregiver education/training  Therapy Frequency (OT): 3 times/wk (3-5x/week as able and available to promote functional progress)  Plan of Care Review  Plan of Care Reviewed With: patient  Progress: no change  Outcome Evaluation: Patient seen for OT evaluation. She was in semi-fowlers position and opened eyes X1. She was able to follow commands for strength and movement testing, but dependent for all other tasks with patient lying in bed with eyes closed. She did not speak verbally with therapist, but per other staff was observed and heard to speak with family earlier on this date. She would benefit from OT services by a licensed therapist to promote highest level of independence and safety to return to prior  level of function and home with son.     Time Calculation:         Time Calculation- OT       Row Name 12/26/23 1522             Time Calculation- OT    OT Received On 12/26/23  -ELEANOR                User Key  (r) = Recorded By, (t) = Taken By, (c) = Cosigned By      Initials Name Provider Type    Caroline Bethea OT Occupational Therapist                  Therapy Charges for Today       Code Description Service Date Service Provider Modifiers Qty    [id number] HC OT EVAL MOD COMPLEXITY 4 12/26/2023 Caroline Mulligan OT GO 1                 Caroline Mulligan OT  12/26/2023

## 2023-12-26 NOTE — THERAPY EVALUATION
Acute Care - Speech Language Pathology   Swallow Initial Evaluation [facility]  CLINICAL DYSPHAGIA EVALUATION     Patient Name: Cristina Hughes  : 1940  MRN: [id number]  Today's Date: 2023             Admit Date: 2023    Cristina Hughes  was seen at bedside this pm on 3S unit of [facility] to assess safety/efficacy of swallowing fnx, determine safest/least restrictive diet tolerance.     She has a medical hx significant for pulmonary embolism, blood clotting disorder, combined systolic and diastolic heart failure, dilated cardiomyopathy, DVT, hypertension, atrial fibrillation, and pulmonary hypertension.     Ms Hughes was unfamiliar to SLP department of [facility] prior to this admission, however has been unable to participate in dysphagia evaluation until this date per surgery consultation and a/a status. Per last SLP note on , she was recommended to continue NPO per her a/a status. Diet was initiated on  of puree solids and thin liquids.     Social History     Socioeconomic History    Marital status:    Tobacco Use    Smoking status: Never    Smokeless tobacco: Never   Vaping Use    Vaping Use: Every day   Substance and Sexual Activity    Drug use: Never      Imaging:   FL Barium Enema Single Contrast [[id number]] Ean as Reviewed   Order Status: Completed Collected: 23 1105    Updated: 23 1324   Narrative:     EXAMINATION: FL BARIUM ENEMA SINGLE-CONTRAST-     CLINICAL INDICATION: evaluate for rectal obstruciton; N39.0-Urinary  tract infection, site not specified; R41.82-Altered mental status,  unspecified     COMPARISON: CT scan performed the same day.     PROCEDURE:  Somewhat limited exam due to patient's inability to be moved.     Tip was placed in the rectum and contrast was injected in the standard  retrograde fashion under fluoroscopy with total fluoroscopy time of 2.06  minutes.     No obstruction was identified to flow in the left colon all the way to  the distal transverse  colon.     No intraluminal mass was seen.     No extrinsic deviation of the colon.      Impression:     No evidence of obstruction in the left colon on today's exam.  The right colon and proximal half of the transverse colon not imaged.     This report was finalized on 12/26/2023 1:22 PM by Dr. Abhinav Riley MD.       CT Abdomen Pelvis Without Contrast [[id number]] Ean as Reviewed   Order Status: Completed Collected: 12/26/23 1008    Updated: 12/26/23 1014   Narrative:     EXAM: CT ABDOMEN PELVIS WO CONTRAST-     TECHNIQUE: Multiple axial CT images were obtained from lung bases  through pubic symphysis WITHOUT administration of IV contrast.  Reformatted images in the coronal and/or sagittal plane(s) were  generated from the axial data set to facilitate diagnostic accuracy  and/or surgical planning.  Oral Contrast:NONE.     Radiation dose reduction techniques were utilized per ALARA protocol.  Automated exposure control was initiated through either or [employer] or  DoseRight software packages by  protocol.    DOSE:     Clinical information AMS; N39.0-Urinary tract infection, site not  specified; R41.82-Altered mental status, unspecified     Comparison 12/22/2023     FINDINGS:     Lower thorax: Small bibasilar effusions and bibasilar consolidation.     Abdomen:     Liver: Homogeneous. No focal hepatic mass or ductal dilatation.     Gallbladder: Cholelithiasis     Pancreas: Unremarkable. No mass or ductal dilatation.     Spleen: Homogeneous. No splenomegaly.     Adrenals: No mass.     Kidneys/ureters: No mass. No obstructive uropathy.  No evidence of  urolithiasis.     GI tract: Colonic distention.. Distal colon is stool and fluid-filled.     MESENTERY: Small volume ascites     Vasculature: Evidence of atherosclerotic vascular disease     Abdominal wall: No focal hernia or mass.     Bladder: No focal mass or significant wall thickening     Reproductive: Unremarkable as visualized     Bones: No acute bony  abnormality.      Impression:        1. Mild colonic distention. Distal colon is fluid and stool-filled.  Consider direct visualization. Thickening of the right colon wall which  may represent a nonspecific colitis.     2. Small volume ascites.     3. Bibasilar effusions and minimal bibasilar airspace disease     4. Cholelithiasis     This report was finalized on 12/26/2023 10:12 AM by Dr. Abhinav Riley MD.       CT Chest Without Contrast Diagnostic [[id number]] Ean as Reviewed   Order Status: Completed Collected: 12/26/23 1013    Updated: 12/26/23 1016   Narrative:     EXAM: CT CHEST WO CONTRAST DIAGNOSTIC-     CLINICAL INDICATION:AMS; N39.0-Urinary tract infection, site not  specified; R41.82-Altered mental status, unspecified     COMPARISON: None immediately available.     TECHNIQUE: Multiple axial CT images were obtained from lung apex through  upper abdomen without the administration of IV contrast. Reformatted  images in the coronal and/or sagittal plane(s) were generated from the  axial data set to facilitate diagnostic accuracy and/or surgical  planning.     Radiation dose reduction techniques were utilized per ALARA protocol.  Automated exposure control was initiated through either or [employer]ose or  Dose[employer] software packages by  protocol.    DOSE (DLP mGy-cm):     FINDINGS:     LUNGS: Patchy right-sided airspace disease and bibasilar consolidation     HEART: Unremarkable.     MEDIASTINUM: No masses. No enlarged lymph nodes.  No fluid collections.     PLEURA: Bibasilar pleural effusions     VASCULATURE: No evidence of aneurysm.     BONES: No acute bony abnormality.     VISUALIZED UPPER ABDOMEN: Please see the CT report for the abdomen and  pelvis     Other: None.      Impression:     Bibasilar effusions and bibasilar consolidation  Patchy airspace disease is present in the right lung      This report was finalized on 12/26/2023 10:14 AM by Dr. Abhinav Riley MD.          CT Abdomen Pelvis  Without Contrast [[id number]] Ean as Reviewed   Order Status: Completed Collected: 12/22/23 1823    Updated: 12/22/23 1830   Narrative:     PROCEDURE: CT of the abdomen and pelvis performed on December 22, 2023.  The examination was performed with 4 mm axial imaging and sagittal and  coronal reconstruction images. The examination was performed according  to as low as reasonably achievable dose protocol. Total DLP = 2041.     HISTORY: Abdominal pain. Acute onset symptoms. Nonlocalized.     COMPARISON: None.     FINDINGS:     Mild enlarged heart size.  Small right and left pleural effusion, left greater than right with  associated compressive atelectasis in the lung bases.  Calcified granulomas in the spleen.  Cholelithiasis.  Small volume of free fluid in the abdomen and there is a small volume of  free fluid along the right and left colic gutter.  No features of cholecystitis or pancreatitis.  No acute process seen in the kidneys.  No abdominal aortic aneurysm or retroperitoneal hemorrhage.  Multilevel degenerative disc disease throughout the lumbar spine with  endplate and facet hypertrophic changes.  Mild osteoarthritis at the right and left hip joint.  Distended configuration to large bowel segments throughout the abdomen  and pelvis with multiple air-fluid levels suggestive of diffuse ileus  versus underlying partial distal large bowel obstruction.  No herniated bowel segment.  No acute process seen in the uterus.  No acute process seen in the bladder.      Impression:        1.  Multiple distended large bowel segments with air-fluid levels  throughout the colon suggestive of diffuse ileus versus underlying  partial distal large bowel obstruction.  2.  The appendix is not identified.  3.  Cholelithiasis.  4.  Small volume of ascites in the abdomen extending to the upper right  and left colic gutter.  5.  Small bilateral pleural effusions, left greater than right with  compressive atelectasis at the lung  bases.  6.  Right lower lobe calcified granuloma.  7.  Calcified granulomas in the spleen.  8.  Multilevel degenerative disc disease throughout the lumbar spine  with endplate and facet hypertrophic changes.  9.  No abdominal aortic aneurysm or retroperitoneal hemorrhage.  10.  No free air, abscess, or hematoma.     This report was finalized on 12/22/2023 6:28 PM by Jaden Stephens MD.       MRI Brain Without Contrast [[id number]] Ean as Reviewed   Order Status: Completed Collected: 12/22/23 1507    Updated: 12/22/23 1512   Narrative:     EXAM:    MR Head Without Intravenous Contrast     EXAM DATE:    12/22/2023 2:24 PM     CLINICAL HISTORY:    stroke protocol     TECHNIQUE:    Magnetic resonance images of the head/brain without intravenous  contrast in multiple planes.     COMPARISON:    No relevant prior studies available.     FINDINGS:    BRAIN AND EXTRA-AXIAL SPACES:  Abnormal T2 signal in the deep cerebral  white matter is consistent with small vessel ischemic/degenerative  changes.  The cerebral and cerebellar sulci are prominent consistent  with brain atrophy.  No hemorrhage.    BONES/JOINTS:  Unremarkable as visualized.    SINUSES:  Unremarkable as visualized.  No acute sinusitis.    MASTOID AIR CELLS:  Unremarkable as visualized.  No mastoid effusion.    ORBITS:  Unremarkable as visualized.      Impression:     1.  Small vessel ischemic/degenerative changes.  2.  Cerebral and cerebellar atrophy.     This report was finalized on 12/22/2023 3:10 PM by Dr. Harry Gross MD.       XR Chest 1 View [[id number]] Ean as Reviewed   Order Status: Completed Collected: 12/22/23 1420    Updated: 12/22/23 1422   Narrative:     EXAM:    XR Chest, 1 View     EXAM DATE:    12/22/2023 1:37 PM     CLINICAL HISTORY:    Acute Stroke Protocol (onset < 12 hrs)     TECHNIQUE:    Frontal view of the chest.     COMPARISON:    No relevant prior studies available.     FINDINGS:    LUNGS AND PLEURAL SPACES:  Left lower lobe linear  atelectasis or  minimal airspace opacity noted.  Coarsened interstitial markings noted  throughout the lungs.  No pneumothorax.    HEART:  Unremarkable as visualized.  No cardiomegaly.    MEDIASTINUM:  Unremarkable as visualized.    BONES/JOINTS:  Unremarkable as visualized.      Impression:       Left lower lobe linear atelectasis or minimal airspace opacity noted.     This report was finalized on 12/22/2023 2:20 PM by Dr. Harry Gross MD.     Labs:   Latest Reference Range & Units 12/22/23 12:39 12/23/23 02:11 12/24/23 07:01 12/25/23 00:57 12/26/23 02:30   WBC 3.40 - 10.80 10*3/mm3 11.09 (H) 9.70 8.52 8.74 8.47   RBC 3.77 - 5.28 10*6/mm3 3.49 (L) 3.42 (L) 3.39 (L) 3.15 (L) 2.94 (L)   Hemoglobin 12.0 - 15.9 g/dL 11.2 (L) 10.9 (L) 10.9 (L) 10.1 (L) 9.4 (L)   Hematocrit 34.0 - 46.6 % 34.0 34.4 32.7 (L) 31.2 (L) 29.9 (L)   Platelets 140 - 450 10*3/mm3 172 151 126 (L) 132 (L) 119 (L)   RDW 12.3 - 15.4 % 23.4 (H) 23.6 (H) 23.2 (H) 23.2 (H) 23.8 (H)   MCV 79.0 - 97.0 fL 97.4 (H) 100.6 (H) 96.5 99.0 (H) 101.7 (H)   MCH 26.6 - 33.0 pg 32.1 31.9 32.2 32.1 32.0   MCHC 31.5 - 35.7 g/dL 32.9 31.7 33.3 32.4 31.4 (L)   MPV 6.0 - 12.0 fL 9.4 9.2 9.8 10.2 10.7   RDW-SD 37.0 - 54.0 fl 82.0 (H) 86.2 (H) 80.6 (H) 82.9 (H) 87.3 (H)   (H): Data is abnormally high  (L): Data is abnormally low  Diet Orders (active) (From admission, onward)       Start     Ordered    12/25/23 1135  Diet: Regular/House Diet; Texture: Pureed (NDD 1); Fluid Consistency: Thin (IDDSI 0)  Diet Effective Now         12/25/23 1134                  Upon SLP entry, Ms Hughes was reclined and sleeping in bed w/ family member present and attentive at bedside. Family member reports that Ms Hughes has not been awake since he arrived. Ms Hughes is able to wake up to moderate speech and tactile cues. She is agreeable to participate in dysphagia assessment. She introduces family member present as her son. She is oriented to self at this time and asks where she is at.  Her son is able to inform he and she later is able to recall this information.     She endorses no difficulty w/ swallowing of which she is aware and additionally denies any history of recurrent pneumonia. Her son additionally states that she does not typically have any pneumonia of which he is aware.     Ms Hughes was observed on 3L nasal cannula w/o complications across this evaluation.     She was positioned upright and centered in bed to accept multiple po presentations of ice chips, solid cracker, puree, and thin liquids via spoon, cup, and straw.  Patient reports being able to self provide po trials however does not demonstrate this across this assessment as she reports she is cold and declines to remove arms from under blankets.      Facial/oral structures were symmetrical upon observation. Lingual protrusion revealed no deviation. Oral mucosa were mildly xerostomic w/ dried tacky secretions present in the oral cavity. She is edentulous at this time, however both she and her son report that she normally does wear dentures to eat. Secretions were clear, thin, and well controlled. OROM/THIERRY was generally weak overall to imitate oral postures. Gag is not assessed. Volitional cough was intact w/ mildly weak intensity, clear in quality, non-productive. Voice was slightly weak in intensity, clear in quality w/ intelligible speech. Vocal intensity improves across conversational exchanges.    Upon po presentations, adequate bolus anticipation and acceptance w/ good labial seal for bolus clearance via spoon bowl, cup rim stability and suction via straw. Bolus formation, manipulation and control were mildly prolonged w/ solid cracker presentation only. A-p transit appeared mildly delayed w/o significant oral residue appreciated. No overt s/s aspiration before the swallow.      Pharyngeal swallow was timely w/ adequate hyolaryngeal elevation per palpation. No overt s/s aspiration evidenced across this evaluation. No  silent aspiration suspected. Patient denied odynophagia.    Visit Dx:     ICD-10-CM ICD-9-CM   1. Acute UTI  N39.0 599.0   2. Altered mental status, unspecified altered mental status type  R41.82 780.97     Patient Active Problem List   Diagnosis    Acute encephalopathy     Past Medical History:   Diagnosis Date    Atrial fibrillation     CHF (congestive heart failure)     Hypertension      History reviewed. No pertinent surgical history.    Impression:     Ms Hughes presented w/ a mild oral dysphagia w/ prolonged mastication of solid consistencies only and a mild delay in a-p transit. Pharyngeal swallow appears wfl across this assessment. Current oral dysphagia is felt to be primarily related to current edentulous status.     She is felt to most benefit from po diet advancement to mechanical soft solids, chopped meats, and thin liquids w/ medications administered whole in puree/thins or crushed PRN. Recommend assistance w/ feeding as needed. SLP discussed importance of upright and centered positioning for all po intake.     SLP Recommendation and Plan      1. Soft to chew textures, chopped meats, thin liquids.    2. Medicatoins whole in puree/thins. Crush PRN.  3. Upright and centered for all po intake  4. PHU precautions.  5. Oral care protocol.    No further formal SLP f/u warranted/recommended at this time.    D/w patient results and recommendations w/ verbal agreement.    D/w RN results and recommendations w/ verbal agreement.    Thank you for allowing me to participate in the care of your patient-  Negrita Mena M.S., CCC-SLP          EDUCATION  The patient has been educated in the following areas:   Dysphagia (Swallowing Impairment) Oral Care/Hydration Modified Diet Instruction.        Time Calculation:    Time Calculation- SLP       Row Name 12/26/23 0809             Time Calculation- SLP    SLP Received On 12/23/23  -                User Key  (r) = Recorded By, (t) = Taken By, (c) = Cosigned By       Initials Name Provider Type    Kimberly Tan MA,CCC-SLP Speech and Language Pathologist                    Therapy Charges for Today       Code Description Service Date Service Provider Modifiers Qty    [id number]  ST EVAL ORAL PHARYNG SWALLOW 4 12/26/2023 Negrita Mena, MS CCC-SLP GN 1                 Negrita Mena MS CCC-SLP  12/26/2023

## 2023-12-26 NOTE — PROGRESS NOTES
Diagnosis: Concern for LBO    No obstruction felt on RADHA or visualized on BE.   CT noted right colon thickening with recommendations for colonoscopy.     Ms Hughes will need to be able to safely tolerate a bowel prep prior to scope in order to adequately visualize her colon wall.     Plan for follow up as outpatient for colonoscopy in 1 month after discharge.     Josselin Chin MD       General Surgeon  [facility]

## 2023-12-26 NOTE — PLAN OF CARE
Goal Outcome Evaluation:  Plan of Care Reviewed With: patient        Progress: improving          Patient has been resting this shift. Patient noted to have a very poor appetite. Patient did respond much better when family participated in care. Patient had hypotension this shift, bolus given. See mar, see orders. Will continue to follow plan of care.

## 2023-12-26 NOTE — CASE MANAGEMENT/SOCIAL WORK
Discharge Planning Assessment  [facility]     Patient Name: Cristina Hughes  MRN: [id number]  Today's Date: 12/26/2023    Admit Date: 12/22/2023    Plan: SS received a consult for discharge planning. SS contacted pt's son, Argenis on this date who state pt hs been at [facility] for short term and plans for pt to return when medically stable. SS contacted [facility] per [place] who states pt is a resident but is not able to check bedhold at this time. SS was informed to call back 12/27/23 for bed hold information. SS to follow and assist with discharge planning.       Discharge Plan       Row Name 12/26/23 4674       Plan    Plan SS received a consult for discharge planning. SS contacted pt's son, Argenis on this date who state pt hs been at [facility] for short term and plans for pt to return when medically stable. SS contacted [facility] per [place] who states pt is a resident but is not able to check bedhold at this time. SS was informed to call back 12/27/23 for bed hold information. SS to follow and assist with discharge planning.    Patient/Family in Agreement with Plan yes                   KAPIL Grover

## 2023-12-26 NOTE — PROGRESS NOTES
[facility] HOSPITALIST PROGRESS NOTE     Patient Identification:  Name:  Cristina Hughes  Age:  83 y.o.  Sex:  female  :  1940  MRN:  [id number]  Visit Number:  [id number]  ROOM: [address] [place] [address]     Primary Care Provider:  Sunshine Prescott PA    Length of stay in inpatient status:  3    Subjective     Chief Compliant:    Chief Complaint   Patient presents with    Altered Mental Status       History of Presenting Illness:    Patient seen in follow-up for encephalopathy and presumptive urinary tract infection.  Hemodynamically stable earlier this morning, later this evening nursing reported hypotension although patient was lethargic, she was arousable and would curse occasionally and nursing with any questions that was asked-this happened both after lunch on the  and the .  When family arrived, she was talking and answering questions appropriately and stayed awake throughout the entire visit.  Afebrile and denied any complaints.    Objective     Current Hospital Meds:apixaban, 5 mg, Oral, Q12H  aspirin, 81 mg, Oral, Daily  digoxin, 125 mcg, Oral, Once per day on   doxycycline, 100 mg, Intravenous, Q12H  metoprolol tartrate, 50 mg, Oral, Q12H  miconazole, 1 application , Topical, Q12H  piperacillin-tazobactam, 4.5 g, Intravenous, Once  piperacillin-tazobactam, 4.5 g, Intravenous, Q8H  senna-docusate sodium, 2 tablet, Oral, BID  sodium chloride, 10 mL, Intravenous, Q12H    sodium chloride, 50 mL/hr, Last Rate: 50 mL/hr (23 1834)        Current Antimicrobial Therapy:  Anti-Infectives (From admission, onward)      Ordered     Dose/Rate Route Frequency Start Stop    23 1307  piperacillin-tazobactam (ZOSYN) IVPB 4.5 g in 100 mL NS VTB        Ordering Provider: Kirill Fernandez DO    4.5 g  over 4 Hours Intravenous Every 8 Hours 23 1303  doxycycline (VIBRAMYCIN) 100 mg in sodium chloride 0.9 % 100 mL IVPB-VTB        Ordering  Provider: Kirill Fernandez DO    100 mg  over 60 Minutes Intravenous Every 12 Hours 12/26/23 1400 01/02/24 1359    12/26/23 1307  piperacillin-tazobactam (ZOSYN) IVPB 4.5 g in 100 mL NS VTB        Ordering Provider: Kirill Fernandez DO    4.5 g  over 30 Minutes Intravenous Once 12/26/23 1400      12/22/23 1454  cefTRIAXone (ROCEPHIN) 1,000 mg in sodium chloride 0.9 % 100 mL IVPB-VTB        Ordering Provider: Milo Narayanan DO    1,000 mg  200 mL/hr over 30 Minutes Intravenous Once 12/22/23 1510 12/22/23 1652    12/22/23 1736  cephalexin (KEFLEX) 500 MG capsule        Ordering Provider: Milo Narayanan DO    500 mg Oral 2 Times Daily 12/22/23 0000            Current Diuretic Therapy:  Diuretics (From admission, onward)      None          ----------------------------------------------------------------------------------------------------------------------  Vital Signs:  Temp:  [96.3 °F (35.7 °C)-97.9 °F (36.6 °C)] 96.3 °F (35.7 °C)  Heart Rate:  [] 97  Resp:  [18] 18  BP: ()/(47-67) 83/47  SpO2:  [95 %-98 %] 98 %  on  Flow (L/min):  [3] 3;   Device (Oxygen Therapy): nasal cannula  Body mass index is 42.62 kg/m².    Wt Readings from Last 3 Encounters:   12/26/23 106 kg (233 lb 1.6 oz)   12/22/23 98.4 kg (217 lb)     Intake & Output (last 3 days)         12/23 0701 12/24 0700 12/24 0701 12/25 0700 12/25 0701 12/26 0700 12/26 0701 12/27 0700    P.O.   0 60    Total Intake(mL/kg)   0 (0) 60 (0.6)    Urine (mL/kg/hr) 0 (0)       Stool 0       Total Output 0       Net 0  0 +60            Urine Unmeasured Occurrence 4 x 5 x 4 x     Stool Unmeasured Occurrence 4 x 4 x 4 x           Diet: Regular/House Diet; Feeding Assistance - Nursing; Texture: Soft to Chew (NDD 3); Soft to Chew: Chopped Meat; Fluid Consistency: Thin (IDDSI 0)  ----------------------------------------------------------------------------------------------------------------------  Physical exam:  Constitutional: Elderly,  chronically ill-appearing female, resting comfortably in bed, no acute distress.      HENT:  Head:  Normocephalic and atraumatic.  Mouth:  Moist mucous membranes.    Eyes:  Conjunctivae and EOM are normal. No scleral icterus.   Cardiovascular:  Normal rate, regular rhythm and normal heart sounds with no murmur. No JVD.   Pulmonary/Chest:  No respiratory distress, no wheezes, no crackles, with normal breath sounds and good air movement. Unlabored. No accessory muscle use.  Abdominal:  Soft. No distension and no tenderness.  Bowel sounds present. No rebound or guarding.   Musculoskeletal:  No tenderness, and no deformity.  No red or swollen joints anywhere.    Neurological:  No cranial nerve deficit.   Nonfocal.   Skin:  Skin is warm and dry. No rash noted. No pallor.   Peripheral vascular:  No clubbing, no cyanosis, no edema. Pedal and tibial pulses 2 out of 4 bilaterally.     Unchanged from 12/25/2023  Electronically signed by Kirill Fernandez DO, 12/26/23, 3:12 PM EST.      ----------------------------------------------------------------------------------------------------------------------  Results from last 7 days   Lab Units 12/26/23  0842 12/26/23  0230 12/25/23  1552 12/25/23  0057 12/24/23  0701 12/23/23  0211 12/23/23  0156 12/22/23  1844 12/22/23  1626 12/22/23  1605 12/22/23  1239   CRP mg/dL  --   --  3.87*  --   --   --   --   --  3.26*  --   --    LACTATE mmol/L 3.3*  --  3.1*  --   --   --  2.0   < >  --    < >  --    WBC 10*3/mm3  --  8.47  --  8.74 8.52   < >  --   --   --   --  11.09*   HEMOGLOBIN g/dL  --  9.4*  --  10.1* 10.9*   < >  --   --   --   --  11.2*   HEMATOCRIT %  --  29.9*  --  31.2* 32.7*   < >  --   --   --   --  34.0   MCV fL  --  101.7*  --  99.0* 96.5   < >  --   --   --   --  97.4*   MCHC g/dL  --  31.4*  --  32.4 33.3   < >  --   --   --   --  32.9   PLATELETS 10*3/mm3  --  119*  --  132* 126*   < >  --   --   --   --  172   INR  2.09*  --  3.14*  --   --   --   --   --   --    "--  2.81*    < > = values in this interval not displayed.     Results from last 7 days   Lab Units 12/25/23  1606   PH, ARTERIAL pH units 7.408   PO2 ART mm Hg 168.0*   PCO2, ARTERIAL mm Hg 35.8   HCO3 ART mmol/L 22.6     Results from last 7 days   Lab Units 12/26/23  0230 12/25/23  0057 12/24/23  1511 12/24/23  0701 12/23/23  0211 12/22/23  1626 12/22/23  1240 12/22/23  1239   SODIUM mmol/L 137 139  --  136 137  --   --  135*   POTASSIUM mmol/L 3.9 3.6  --  3.1* 3.4*  --   --  4.0   MAGNESIUM mg/dL  --  2.3 2.4  --   --   --   --   --    CHLORIDE mmol/L 103 104  --  102 99  --   --  99   CO2 mmol/L 20.4* 26.0  --  22.5 21.4*  --   --  24.9   BUN mg/dL 28* 30*  --  29* 41*  --   --  40*   CREATININE mg/dL 1.31* 1.22*  --  1.08* 1.29*  --    < > 1.36*   CALCIUM mg/dL 8.2* 8.5*  --  8.3* 8.9  --   --  8.8   GLUCOSE mg/dL 108* 97  --  83 99  --   --  109*   ALBUMIN g/dL  --  2.7*  --   --  2.9*  --   --  2.9*   BILIRUBIN mg/dL  --  1.0  --   --  1.5* 1.6*  --  1.7*   ALK PHOS U/L  --  245*  --   --  335*  --   --  357*   AST (SGOT) U/L  --  54*  --   --  74*  --   --  86*   ALT (SGPT) U/L  --  38*  --   --  45*  --   --  48*    < > = values in this interval not displayed.   Estimated Creatinine Clearance: 37.2 mL/min (A) (by C-G formula based on SCr of 1.31 mg/dL (H)).  No results found for: \"AMMONIA\"    Results from last 7 days   Lab Units 12/22/23  1626 12/22/23  1424   HSTROP T ng/L 27* 25*             No results found for: \"HGBA1C\", \"POCGLU\"  No results found for: \"TSH\", \"FREET4\"  No results found for: \"PREGTESTUR\", \"PREGSERUM\", \"HCG\", \"HCGQUANT\"  Pain Management Panel           No data to display              Brief Urine Lab Results  (Last result in the past 365 days)        Color   Clarity   Blood   Leuk Est   Nitrite   Protein   CREAT   Urine HCG        12/22/23 1317 Yellow   Cloudy   Moderate (2+)   Moderate (2+)   Negative   Negative                 Blood Culture   Date Value Ref Range Status   12/22/2023 No " "growth at 2 days  Preliminary   12/22/2023 No growth at 2 days  Preliminary     Urine Culture   Date Value Ref Range Status   12/22/2023 Yeast isolated (A)  Final     Comment:     No further workup.     No results found for: \"WOUNDCX\"  No results found for: \"STOOLCX\"  No results found for: \"RESPCX\"  No results found for: \"AFBCX\"  Results from last 7 days   Lab Units 12/26/23  0842 12/25/23  1555 12/25/23  1552 12/23/23  0156 12/22/23  2201 12/22/23  1844 12/22/23  1626 12/22/23  1605   PROCALCITONIN ng/mL  --   --  0.15 0.14  --   --   --   --    LACTATE mmol/L 3.3*  --  3.1* 2.0 2.2* 2.4*  --  2.9*   SED RATE mm/hr  --  6  --   --   --   --   --   --    CRP mg/dL  --   --  3.87*  --   --   --  3.26*  --        I have personally looked at the labs and they are summarized above.  ----------------------------------------------------------------------------------------------------------------------  Detailed radiology reports for the last 24 hours:  Imaging Results (Last 24 Hours)       Procedure Component Value Units Date/Time    FL Barium Enema Single Contrast [[id number]] Collected: 12/26/23 1105     Updated: 12/26/23 1324    Narrative:      EXAMINATION: FL BARIUM ENEMA SINGLE-CONTRAST-      CLINICAL INDICATION: evaluate for rectal obstruciton; N39.0-Urinary  tract infection, site not specified; R41.82-Altered mental status,  unspecified        COMPARISON: CT scan performed the same day.     PROCEDURE:  Somewhat limited exam due to patient's inability to be moved.     Tip was placed in the rectum and contrast was injected in the standard  retrograde fashion under fluoroscopy with total fluoroscopy time of 2.06  minutes.     No obstruction was identified to flow in the left colon all the way to  the distal transverse colon.     No intraluminal mass was seen.     No extrinsic deviation of the colon.       Impression:      No evidence of obstruction in the left colon on today's exam.  The right colon and proximal half " of the transverse colon not imaged.        This report was finalized on 12/26/2023 1:22 PM by Dr. Abhinav Riley MD.       CT Chest Without Contrast Diagnostic [[id number]] Collected: 12/26/23 1013     Updated: 12/26/23 1016    Narrative:      EXAM: CT CHEST WO CONTRAST DIAGNOSTIC-      CLINICAL INDICATION:AMS; N39.0-Urinary tract infection, site not  specified; R41.82-Altered mental status, unspecified      COMPARISON: None immediately available.     TECHNIQUE: Multiple axial CT images were obtained from lung apex through  upper abdomen without the administration of IV contrast. Reformatted  images in the coronal and/or sagittal plane(s) were generated from the  axial data set to facilitate diagnostic accuracy and/or surgical  planning.     Radiation dose reduction techniques were utilized per ALARA protocol.  Automated exposure control was initiated through either or [employer] or  [employer] software packages by  protocol.    DOSE (DLP mGy-cm):        FINDINGS:     LUNGS: Patchy right-sided airspace disease and bibasilar consolidation     HEART: Unremarkable.     MEDIASTINUM: No masses. No enlarged lymph nodes.  No fluid collections.     PLEURA: Bibasilar pleural effusions     VASCULATURE: No evidence of aneurysm.     BONES: No acute bony abnormality.     VISUALIZED UPPER ABDOMEN: Please see the CT report for the abdomen and  pelvis     Other: None.       Impression:      Bibasilar effusions and bibasilar consolidation  Patchy airspace disease is present in the right lung                 This report was finalized on 12/26/2023 10:14 AM by Dr. Abhinav Riley MD.       CT Abdomen Pelvis Without Contrast [[id number]] Collected: 12/26/23 1008     Updated: 12/26/23 1014    Narrative:      EXAM: CT ABDOMEN PELVIS WO CONTRAST-         TECHNIQUE: Multiple axial CT images were obtained from lung bases  through pubic symphysis WITHOUT administration of IV contrast.  Reformatted images in the coronal and/or sagittal  plane(s) were  generated from the axial data set to facilitate diagnostic accuracy  and/or surgical planning.  Oral Contrast:NONE.     Radiation dose reduction techniques were utilized per ALARA protocol.  Automated exposure control was initiated through either or [employer] or  [employer] software packages by  protocol.    DOSE:     Clinical information AMS; N39.0-Urinary tract infection, site not  specified; R41.82-Altered mental status, unspecified      Comparison 12/22/2023     FINDINGS:     Lower thorax: Small bibasilar effusions and bibasilar consolidation.     Abdomen:     Liver: Homogeneous. No focal hepatic mass or ductal dilatation.     Gallbladder: Cholelithiasis     Pancreas: Unremarkable. No mass or ductal dilatation.     Spleen: Homogeneous. No splenomegaly.     Adrenals: No mass.     Kidneys/ureters: No mass. No obstructive uropathy.  No evidence of  urolithiasis.     GI tract: Colonic distention.. Distal colon is stool and fluid-filled.     MESENTERY: Small volume ascites     Vasculature: Evidence of atherosclerotic vascular disease     Abdominal wall: No focal hernia or mass.        Bladder: No focal mass or significant wall thickening     Reproductive: Unremarkable as visualized     Bones: No acute bony abnormality.       Impression:         1. Mild colonic distention. Distal colon is fluid and stool-filled.  Consider direct visualization. Thickening of the right colon wall which  may represent a nonspecific colitis.     2. Small volume ascites.     3. Bibasilar effusions and minimal bibasilar airspace disease     4. Cholelithiasis                          This report was finalized on 12/26/2023 10:12 AM by Dr. Abhinav iRley MD.             Assessment & Plan      Patient is an 83-year-old female with history significant for PE, dilated cardiomyopathy, atrial fibrillation with pulmonary hypertension who presented to the ER with reports of AMS per family.    #Acute metabolic  encephalopathy  #Sepsis d/t suspected acute urinary tract infection, POA  --Patient presented with reports of AMS per family, recently admitted to [facility] on 11/26 due to weakness and AMS-at that time was diagnosed with VRE and completed a course of oral Zyvox.  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  Intermittently hypotensive, hard to arouse again today but then when family arrived she stayed awake and talk to them as they visited  -- As urine culture is negative, antibiotics broadened to Zosyn to include cover potential colitis noted on CT  --Neurology consulted, signed off    #Possible colitis  #Ileus versus partial large bowel obstruction  #Acute hepatitis  #Direct hyperbilirubinemia  --CT abdomen/pelvis noted multiple distended large bowel segments consistent of ileus versus partial distal large bowel obstruction, small volume of ascites  --MRCP without any features of choledocholithiasis, cholelithiasis noted  --Initial elevation in transaminases and direct hyperbilirubinemia, but these are currently improving  --Repeat CT abdomen pelvis noted mild colonic distention with fluid and stool-filled, thickening of the right colon wall may represent colitis  --Acute hepatitis panel negative  --No nausea or vomiting or GI symptoms  --Multiple bowel movements her last several days, no evidence of ileus or SBO, general surgery following, plan for outpatient scope  --Given intermittent hypotension without otherwise clear source, will empirically change antibiotics to Zosyn for intra-abdominal  coverage    #Dementia without behavioral disturbances   --I discussed with son at length on 12/26, noted decline over the past year after  had passed.  Noted prior to any acute illnesses while at home she would be very forgetful and hallucinate-he stated she would see people at home in her house    #Cardiomyopathy  #Essential hypertension  #Atrial fibrillation  --Rate controlled, documented cardiomyopathy both systolic/diastolic however echo only notes diastolic dysfunction, none previous on file  --Echo noted EF is 66 to 70%, mild pulmonary hypertension  --Continue Eliquis for stroke prophylaxis  --Continue Lopressor and dig with holding parameters if able to take p.o.  --Follow-up with cardiology outpatient    #Moderate protein and albumin malnutrition  #Bilateral pleural effusions, left greater than right  #History of pulmonary embolism    CHECKLIST:  Abx: Zosyn  VTE: Eliquis  GI ppx: None  Diet: Consistent carb  Code: CPR, full  Dispo: Patient is high risk due to encephalopathy, sepsis due to suspected urinary tract infection.  Anticipate greater than 2 midnight stay.  Dispo Home versus SNF when stable.    Kirill Fernandez,   [facility] Hospitalist  12/26/23  15:04 EST

## 2023-12-27 NOTE — PLAN OF CARE
Goal Outcome Evaluation:            Contact Spore precautions in place for VRE and C Diff rule out. No episodes of loose stools this evening. Patient was able to urinate. Vaginal bleeding still present. Seemed more alert than she was previously.

## 2023-12-27 NOTE — CASE MANAGEMENT/SOCIAL WORK
Discharge Planning Assessment  [facility]     Patient Name: Cristina Hughes  MRN: [id number]  Today's Date: 12/27/2023    Admit Date: 12/22/2023    Plan: SS [facility] with [facility] per Satinder who stated pt does not have bed hold but facility will consider pt for readmit when medically stable for discharge based on bed availablity and medical status.  Pt's insurance will require a pre authorization prior to discharge.  SS will follow.       Discharge Plan       Row Name 12/27/23 1025       Plan    Plan SS spoke with [facility] per Satinder who stated pt does not have bed hold but facility will consider pt for readmit when medically stable for discharge based on bed availablity and medical status.  Pt's insurance will require a pre authorization prior to discharge.  SS will follow.                  Continued Care and Services - Admitted Since 12/22/2023    Coordination has not been started for this encounter.       Expected Discharge Date and Time       Expected Discharge Date Expected Discharge Time    Dec 27, 2023             JACOB MacdonaldW

## 2023-12-27 NOTE — CASE MANAGEMENT/SOCIAL WORK
Discharge Planning Assessment  [facility]     Patient Name: Cristina Hughes  MRN: [id number]  Today's Date: 12/27/2023    Admit Date: 12/22/2023    Plan: SS spoke with Satinder at [facility] who is reviewing pt's information for possible readmit.  Pt's insurance will require pre authorization prior to discharge.  SS will follow.       Discharge Plan       Row Name 12/27/23 1511       Plan    Plan SS spoke with Satinder at [facility] who is reviewing pt's information for possible readmit.  Pt's insurance will require pre authorization prior to discharge.  SS will follow.                  Continued Care and Services - Admitted Since 12/22/2023       Destination       Service Provider Request Status Selected Services Address Phone Fax Patient Preferred    [facility] Pending - Request Sent N/A [address] [facility] [address] [phone] --                  Expected Discharge Date and Time       Expected Discharge Date Expected Discharge Time    Dec 27, 2023           JACOB MacdonaldW

## 2023-12-27 NOTE — PROGRESS NOTES
[facility] HOSPITALIST PROGRESS NOTE     Patient Identification:  Name:  Cristina Hughes  Age:  83 y.o.  Sex:  female  :  1940  MRN:  [id number]  Visit Number:  [id number]  ROOM: [address] [place] [address]     Primary Care Provider:  Sunshine Prescott PA    Length of stay in inpatient status:  4    Subjective     Chief Compliant:    Chief Complaint   Patient presents with    Altered Mental Status       History of Presenting Illness:    Patient seen in follow-up for encephalopathy and presumptive urinary tract infection.  Patient has remained hemodynamically stable.  She is more awake and interactive this morning, pleasant and answers questions appropriately.  She denied any pain or symptoms but stated she occasionally did get nauseous.  Denies any abdominal pain.  Afebrile.  No adverse events noted overnight.    Objective     Current Hospital Meds:apixaban, 5 mg, Oral, Q12H  aspirin, 81 mg, Oral, Daily  digoxin, 125 mcg, Oral, Once per day on   doxycycline, 100 mg, Intravenous, Q12H  ipratropium-albuterol, 3 mL, Nebulization, 4x Daily - RT  metoprolol tartrate, 50 mg, Oral, Q12H  miconazole, 1 application , Topical, Q12H  piperacillin-tazobactam, 4.5 g, Intravenous, Q8H  promethazine, 6.25 mg, Oral, BID  senna-docusate sodium, 2 tablet, Oral, BID  sodium chloride, 10 mL, Intravenous, Q12H           Current Antimicrobial Therapy:  Anti-Infectives (From admission, onward)      Ordered     Dose/Rate Route Frequency Start Stop    23 1307  piperacillin-tazobactam (ZOSYN) IVPB 4.5 g in 100 mL NS VTB        Ordering Provider: Kirill Fernandez DO    4.5 g  over 4 Hours Intravenous Every 8 Hours 23 2000 24 1959    23 1303  doxycycline (VIBRAMYCIN) 100 mg in sodium chloride 0.9 % 100 mL IVPB-VTB        Ordering Provider: Kirill Fernandez DO    100 mg  over 60 Minutes Intravenous Every 12 Hours 23 1400 24 1359    23 1307  piperacillin-tazobactam  (ZOSYN) IVPB 4.5 g in 100 mL NS VTB        Ordering Provider: Kirill Fernandez DO    4.5 g  over 30 Minutes Intravenous Once 12/26/23 1400 12/26/23 1534    12/22/23 1454  cefTRIAXone (ROCEPHIN) 1,000 mg in sodium chloride 0.9 % 100 mL IVPB-VTB        Ordering Provider: Milo Narayanan DO    1,000 mg  200 mL/hr over 30 Minutes Intravenous Once 12/22/23 1510 12/22/23 1652    12/22/23 1736  cephalexin (KEFLEX) 500 MG capsule        Ordering Provider: Milo Narayanan DO    500 mg Oral 2 Times Daily 12/22/23 0000            Current Diuretic Therapy:  Diuretics (From admission, onward)      None          ----------------------------------------------------------------------------------------------------------------------  Vital Signs:  Temp:  [96.4 °F (35.8 °C)-97.4 °F (36.3 °C)] 97.4 °F (36.3 °C)  Heart Rate:  [] 96  Resp:  [18-20] 18  BP: ()/(47-78) 116/66  SpO2:  [93 %-98 %] 98 %  on  Flow (L/min):  [3] 3;   Device (Oxygen Therapy): nasal cannula  Body mass index is 42.59 kg/m².    Wt Readings from Last 3 Encounters:   12/27/23 106 kg (232 lb 14.4 oz)   12/22/23 98.4 kg (217 lb)     Intake & Output (last 3 days)         12/24 0701 12/25 0700 12/25 0701 12/26 0700 12/26 0701  12/27 0700 12/27 0701 12/28 0700    P.O.  0 60 120    Total Intake(mL/kg)  0 (0) 60 (0.6) 120 (1.1)    Urine (mL/kg/hr)        Stool        Total Output        Net  0 +60 +120            Urine Unmeasured Occurrence 5 x 4 x 1 x     Stool Unmeasured Occurrence 4 x 4 x 3 x           Diet: Regular/House Diet; Feeding Assistance - Nursing; Texture: Soft to Chew (NDD 3); Soft to Chew: Chopped Meat; Fluid Consistency: Thin (IDDSI 0)  ----------------------------------------------------------------------------------------------------------------------  Physical exam:  Constitutional: Elderly, chronically ill-appearing female, resting comfortably in bed, or awake, alert and interactive today no acute distress.      HENT:  Head:   Normocephalic and atraumatic.  Mouth:  Moist mucous membranes.    Eyes:  Conjunctivae and EOM are normal. No scleral icterus.   Cardiovascular:  Normal rate, regular rhythm and normal heart sounds with no murmur. No JVD.   Pulmonary/Chest:  No respiratory distress, no wheezes, no crackles, with normal breath sounds and good air movement. Unlabored. No accessory muscle use.  Abdominal:  Soft. No distension and no tenderness.  Bowel sounds present. No rebound or guarding.   Musculoskeletal:  No tenderness, and no deformity.  No red or swollen joints anywhere.    Neurological:  No cranial nerve deficit.   Nonfocal.   Skin:  Skin is warm and dry. No rash noted. No pallor.   Peripheral vascular:  No clubbing, no cyanosis, no edema. Pedal and tibial pulses 2 out of 4 bilaterally.     ----------------------------------------------------------------------------------------------------------------------  Results from last 7 days   Lab Units 12/27/23  0136 12/26/23  0842 12/26/23  0230 12/25/23  1552 12/25/23  0057 12/23/23  0211 12/23/23  0156 12/22/23  1844 12/22/23  1626 12/22/23  1605 12/22/23  1239   CRP mg/dL  --   --   --  3.87*  --   --   --   --  3.26*  --   --    LACTATE mmol/L  --  3.3*  --  3.1*  --   --  2.0   < >  --    < >  --    WBC 10*3/mm3 8.65  --  8.47  --  8.74   < >  --   --   --   --  11.09*   HEMOGLOBIN g/dL 10.2*  --  9.4*  --  10.1*   < >  --   --   --   --  11.2*   HEMATOCRIT % 32.2*  --  29.9*  --  31.2*   < >  --   --   --   --  34.0   MCV fL 101.9*  --  101.7*  --  99.0*   < >  --   --   --   --  97.4*   MCHC g/dL 31.7  --  31.4*  --  32.4   < >  --   --   --   --  32.9   PLATELETS 10*3/mm3 127*  --  119*  --  132*   < >  --   --   --   --  172   INR   --  2.09*  --  3.14*  --   --   --   --   --   --  2.81*    < > = values in this interval not displayed.     Results from last 7 days   Lab Units 12/25/23  1606   PH, ARTERIAL pH units 7.408   PO2 ART mm Hg 168.0*   PCO2, ARTERIAL mm Hg 35.8   HCO3  "ART mmol/L 22.6     Results from last 7 days   Lab Units 12/27/23  0136 12/26/23  0230 12/25/23  0057 12/24/23  1511 12/24/23  0701 12/23/23  0211 12/22/23  1626 12/22/23  1240 12/22/23  1239   SODIUM mmol/L 138 137 139  --    < > 137  --   --  135*   POTASSIUM mmol/L 3.9 3.9 3.6  --    < > 3.4*  --   --  4.0   MAGNESIUM mg/dL  --   --  2.3 2.4  --   --   --   --   --    CHLORIDE mmol/L 105 103 104  --    < > 99  --   --  99   CO2 mmol/L 17.5* 20.4* 26.0  --    < > 21.4*  --   --  24.9   BUN mg/dL 27* 28* 30*  --    < > 41*  --   --  40*   CREATININE mg/dL 1.33* 1.31* 1.22*  --    < > 1.29*  --    < > 1.36*   CALCIUM mg/dL 8.3* 8.2* 8.5*  --    < > 8.9  --   --  8.8   GLUCOSE mg/dL 87 108* 97  --    < > 99  --   --  109*   ALBUMIN g/dL  --   --  2.7*  --   --  2.9*  --   --  2.9*   BILIRUBIN mg/dL  --   --  1.0  --   --  1.5* 1.6*  --  1.7*   ALK PHOS U/L  --   --  245*  --   --  335*  --   --  357*   AST (SGOT) U/L  --   --  54*  --   --  74*  --   --  86*   ALT (SGPT) U/L  --   --  38*  --   --  45*  --   --  48*    < > = values in this interval not displayed.   Estimated Creatinine Clearance: 36.7 mL/min (A) (by C-G formula based on SCr of 1.33 mg/dL (H)).  No results found for: \"AMMONIA\"    Results from last 7 days   Lab Units 12/22/23  1626 12/22/23  1424   HSTROP T ng/L 27* 25*             No results found for: \"HGBA1C\", \"POCGLU\"  No results found for: \"TSH\", \"FREET4\"  No results found for: \"PREGTESTUR\", \"PREGSERUM\", \"HCG\", \"HCGQUANT\"  Pain Management Panel           No data to display              Brief Urine Lab Results  (Last result in the past 365 days)        Color   Clarity   Blood   Leuk Est   Nitrite   Protein   CREAT   Urine HCG        12/22/23 1317 Yellow   Cloudy   Moderate (2+)   Moderate (2+)   Negative   Negative                 Blood Culture   Date Value Ref Range Status   12/22/2023 No growth at 2 days  Preliminary   12/22/2023 No growth at 2 days  Preliminary     Urine Culture   Date Value Ref " "Range Status   12/22/2023 Yeast isolated (A)  Final     Comment:     No further workup.     No results found for: \"WOUNDCX\"  No results found for: \"STOOLCX\"  No results found for: \"RESPCX\"  No results found for: \"AFBCX\"  Results from last 7 days   Lab Units 12/26/23  0842 12/25/23  1555 12/25/23  1552 12/23/23  0156 12/22/23  2201 12/22/23  1844 12/22/23  1626 12/22/23  1605   PROCALCITONIN ng/mL  --   --  0.15 0.14  --   --   --   --    LACTATE mmol/L 3.3*  --  3.1* 2.0 2.2* 2.4*  --  2.9*   SED RATE mm/hr  --  6  --   --   --   --   --   --    CRP mg/dL  --   --  3.87*  --   --   --  3.26*  --        I have personally looked at the labs and they are summarized above.  ----------------------------------------------------------------------------------------------------------------------  Detailed radiology reports for the last 24 hours:  Imaging Results (Last 24 Hours)       Procedure Component Value Units Date/Time    FL Barium Enema Single Contrast [[id number]] Collected: 12/26/23 1105     Updated: 12/26/23 1324    Narrative:      EXAMINATION: FL BARIUM ENEMA SINGLE-CONTRAST-      CLINICAL INDICATION: evaluate for rectal obstruciton; N39.0-Urinary  tract infection, site not specified; R41.82-Altered mental status,  unspecified        COMPARISON: CT scan performed the same day.     PROCEDURE:  Somewhat limited exam due to patient's inability to be moved.     Tip was placed in the rectum and contrast was injected in the standard  retrograde fashion under fluoroscopy with total fluoroscopy time of 2.06  minutes.     No obstruction was identified to flow in the left colon all the way to  the distal transverse colon.     No intraluminal mass was seen.     No extrinsic deviation of the colon.       Impression:      No evidence of obstruction in the left colon on today's exam.  The right colon and proximal half of the transverse colon not imaged.        This report was finalized on 12/26/2023 1:22 PM by Dr. Abhinav Riley, " MD.             Assessment & Plan      Patient is an 83-year-old female with history significant for PE, dilated cardiomyopathy, atrial fibrillation with pulmonary hypertension who presented to the ER with reports of AMS per family.    #Acute metabolic encephalopathy  #Sepsis d/t suspected acute urinary tract infection, POA  --Patient presented with reports of AMS per family, recently admitted to [facility] on 11/26 due to weakness and AMS-at that time was diagnosed with VRE and completed a course of oral Zyvox.  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  Intermittently hypotensive, hard to arouse again today but then when family arrived she stayed awake and talk to them as they visited  --As urine culture is negative, antibiotics broadened to Zosyn to include cover potential colitis noted on CT  --Patient is more awake and alert this morning and able to participate in conversation, looks improved from prior days.  If she remains hemodynamically stable, potentially back to the nursing home in 24 to 48 hours.    #Possible colitis  #Ileus versus partial large bowel obstruction  #Acute hepatitis  #Direct hyperbilirubinemia  --CT abdomen/pelvis noted multiple distended large bowel segments consistent of ileus versus partial distal large bowel obstruction, small volume of ascites  --MRCP without any features of choledocholithiasis, cholelithiasis noted  --Initial elevation in transaminases and direct hyperbilirubinemia, but these are currently improving  --Repeat CT abdomen pelvis noted mild colonic distention  with fluid and stool-filled, thickening of the right colon wall may represent colitis  --Acute hepatitis panel negative  --No nausea or vomiting or GI symptoms  --Multiple bowel movements her last several days, no evidence of ileus or SBO, general surgery following, plan for outpatient scope  --Continue Zosyn for intra-abdominal coverage    #Dementia without behavioral disturbances   --I discussed with son at length on 12/26, noted decline over the past year after  had passed.  Noted prior to any acute illnesses while at home she would be very forgetful and hallucinate-he stated she would see people at home in her house    #Cardiomyopathy  #Essential hypertension  #Atrial fibrillation  --Rate controlled, documented cardiomyopathy both systolic/diastolic however echo only notes diastolic dysfunction, none previous on file  --Echo noted EF is 66 to 70%, mild pulmonary hypertension  --Continue Eliquis for stroke prophylaxis  --Continue Lopressor and dig with holding parameters if able to take p.o.  --Follow-up with cardiology outpatient    #Moderate protein and albumin malnutrition  #Bilateral pleural effusions, left greater than right  #History of pulmonary embolism    CHECKLIST:  Abx: Zosyn  VTE: Eliquis  GI ppx: None  Diet: Consistent carb  Code: CPR, full  Dispo: Patient seems to be improving, more awake and interactive today.  If she remains hemodynamically stable and all cultures remain negative, possibly discharge back to the nursing facility in 24 to 48 hours.    Kirill Fernandez DO  [facility] Hospitalist  12/27/23  11:48 EST

## 2023-12-27 NOTE — THERAPY EVALUATION
Acute Care - Physical Therapy Initial Evaluation   [facility]     Patient Name: Cristina Hughes  : 1940  MRN: [id number]  Today's Date: 2023      Visit Dx:     ICD-10-CM ICD-9-CM   1. Acute UTI  N39.0 599.0   2. Altered mental status, unspecified altered mental status type  R41.82 780.97     Patient Active Problem List   Diagnosis    Acute encephalopathy     Past Medical History:   Diagnosis Date    Atrial fibrillation     CHF (congestive heart failure)     Hypertension      History reviewed. No pertinent surgical history.  PT Assessment (last 12 hours)       PT Evaluation and Treatment       Row Name 23 1252          Physical Therapy Time and Intention    Document Type evaluation  -KM     Mode of Treatment individual therapy;physical therapy  -KM     Patient Effort fair  -KM       Row Name 23 1252          General Information    Patient Profile Reviewed yes  -KM     Patient Observations lethargic  -KM     Prior Level of Function --  Pt. was recently admitted to nursing home shortly before being admitted to hospital.  -KM     Existing Precautions/Restrictions fall  -KM     Risks Reviewed patient:;LOB;nausea/vomiting;dizziness;increased discomfort  -KM     Benefits Reviewed patient:;improve function;increase independence;increase strength;increase balance  -KM     Barriers to Rehab previous functional deficit  -KM       Row Name 23 1252          Living Environment    Current Living Arrangements assisted living facility  -KM     Primary Care Provided by other (see comments)  facility staff  -KM       Row Name 23 1252          Home Use of Assistive/Adaptive Equipment    Equipment Currently Used at Home --  unable to obtain from pt.  -KM       Row Name 23 1252          Cognition    Affect/Mental Status (Cognition) flat/blunted affect;low arousal/lethargic  -KM     Orientation Status (Cognition) unable/difficult to assess  -KM     Follows Commands (Cognition) follows one-step  commands;50-74% accuracy;delayed response/completion;physical/tactile prompts required;repetition of directions required  -KM       Row Name 12/27/23 1252          Range of Motion (ROM)    Range of Motion --  BLE PROM limited d/t pain  -KM       Row Name 12/27/23 1252          Strength (Manual Muscle Testing)    Strength (Manual Muscle Testing) --  BLE strength grossly 1/5  -KM       Row Name 12/27/23 1252          Bed Mobility    Bed Mobility bed mobility (all) activities  -KM     All Activities, [place] (Bed Mobility) dependent (less than 25% patient effort)  -KM       Row Name 12/27/23 1252          Transfers    Comment, (Transfers) unable to perform safely  -KM       Row Name 12/27/23 1252          Gait/Stairs (Locomotion)    Patient was able to Ambulate no, other medical factors prevent ambulation  -KM     Reason Patient was unable to Ambulate Non-Ambulatory at Baseline  -KM       Row Name 12/27/23 1252          Safety Issues, Functional Mobility    Safety Issues Affecting Function (Mobility) awareness of need for assistance;friction/shear risk;insight into deficits/self-awareness  -KM     Impairments Affecting Function (Mobility) balance;endurance/activity tolerance;postural/trunk control;strength  -KM       Row Name 12/27/23 1252          Balance    Comment, Balance unable to assess at time of evaluation  -KM       Row Name 12/27/23 1252          Plan of Care Review    Plan of Care Reviewed With patient  -KM     Outcome Evaluation Pt. evaluation completed during PT session. She was able to perform bed mobility dependently. She was unable to perform transfers d/t safety and being dependent at baseline. Anticipated discharge to SNF.  -KM       Row Name 12/27/23 1252          Therapy Assessment/Plan (PT)    Functional Level at Time of Evaluation (PT) dependent  -KM     Criteria for Skilled Interventions Met (PT) other (see comments)  pt. at baseline  -KM     Therapy Frequency (PT) evaluation only  -KM      Problem List (PT) problems related to;mobility  -KM     Activity Limitations Related to Problem List (PT) unable to ambulate safely;unable to transfer safely  -KM       Row Name 12/27/23 1252          Therapy Plan Review/Discharge Plan (PT)    Therapy Plan Review (PT) evaluation/treatment results reviewed;patient  -KM               User Key  (r) = Recorded By, (t) = Taken By, (c) = Cosigned By      Initials Name Provider Type    Qamar Nolen, BIANCA Physical Therapist                    Physical Therapy Education       Title: PT OT SLP Therapies (Done)       Topic: Physical Therapy (Done)       Point: Mobility training (Done)       Learning Progress Summary             Patient Acceptance, E,TB, VU by  at 12/27/2023 1309                         Point: Home exercise program (Done)       Learning Progress Summary             Patient Acceptance, E,TB, VU by  at 12/27/2023 1309                         Point: Body mechanics (Done)       Learning Progress Summary             Patient Acceptance, E,TB, VU by  at 12/27/2023 1309                         Point: Precautions (Done)       Learning Progress Summary             Patient Acceptance, E,TB, VU by  at 12/27/2023 1309                                         User Key       Initials Effective Dates Name Provider Type [facility] 05/24/22 -  Qamar Ramirez, PT Physical Therapist PT                  PT Recommendation and Plan  Anticipated Discharge Disposition (PT): skilled nursing facility  Therapy Frequency (PT): evaluation only  Plan of Care Reviewed With: patient  Outcome Evaluation: Pt. evaluation completed during PT session. She was able to perform bed mobility dependently. She was unable to perform transfers d/t safety and being dependent at baseline. Anticipated discharge to SNF.       Time Calculation:    PT Charges       Row Name 12/27/23 8716             Time Calculation    PT Received On 12/27/23  -                User Key  (r) = Recorded By, (t) =  Taken By, (c) = Cosigned By      Initials Name Provider Type    KM Qamar Ramirez, PT Physical Therapist                  Therapy Charges for Today       Code Description Service Date Service Provider Modifiers Qty    [id number] HC PT EVAL MOD COMPLEXITY 4 12/27/2023 Qamar Ramirez, PT GP 1            PT G-Codes  AM-PAC 6 Clicks Score (PT): 6    Qamar Ramirez PT  12/27/2023

## 2023-12-27 NOTE — DISCHARGE PLACEMENT REQUEST
"Franchesca Hannah (83 y.o. Female)       Date of Birth   1940    Social Security Number       Address   [address] [address]    Home Phone   [phone]    MRN   [id number]       Taoist   Unknown    Marital Status                               Admission Date   12/22/23    Admission Type   Emergency    Admitting Provider   Gustavo Vaughan MD    Attending Provider   Kirill Fernandez DO    Department, Room/Bed   [address], 3314/2S       Discharge Date       Discharge Disposition       Discharge Destination                                 Attending Provider: Kirill Fernandez DO    Allergies: No Known Allergies    Isolation: Spore   Infection: C.difficile (rule out) (12/26/23)   Code Status: CPR    Ht: 157.5 cm (62.01\")   Wt: 106 kg (232 lb 14.4 oz)    Admission Cmt: None   Principal Problem: Acute encephalopathy [G93.40]                   Active Insurance as of 12/22/2023       Primary Coverage       Payor Plan Insurance Group Employer/Plan Group    ANTHEM MEDICARE REPLACEMENT ANTHEM MEDICARE ADVANTAGE [id number]       Payor Plan Address Payor Plan Phone Number Payor Plan Fax Number Effective Dates    [address] [phone]  1/1/2022 - None Entered    [facility] [address]         Subscriber Name Subscriber Birth Date Member ID       FRANCHESCA HANNAH 1940 [id number]                     Emergency Contacts        (Rel.) Home Phone Work Phone Mobile Phone    Argenis hannah (Son) [phone] -- --              Emergency Contact Information       Name Relation Home Work Mobile    Argenis hannah Son [phone]            Insurance Information                  ANTHEM MEDICARE REPLACEMENT/ANTHEM MEDICARE ADVANTAGE Phone: [phone]    Subscriber: Franchesca Hannah Subscriber#: [id number]    Group#: [id number] Precert#: --          Problem List             Codes Noted - Resolved       Hospital    * (Principal) Acute " encephalopathy ICD-10-CM: G93.40  ICD-9-CM: 348.30 2023 - Present        History & Physical        Goergette Briseno PA-C at 23 0027       Attestation signed by Gustavo Vaughan MD at 23 0451 (Updated)    I have discussed this patient with Georgette Briseno PA-C, and have reviewed this documentation and agree. Treat UTI empirically, covering for both gram negative organisms with IV rocephin and recent VRE with linezolid. CT abdomen/pelvis shows obstruction vs ileus, and found to have similar findings on imaging in [place]. CT here also mentioned ascites, which could be malignant if she in deed has endometrial carcinoma. Will keep NPO and await further recommendations from general surgery regarding abdominal CT findings.                        [facility]  HISTORY & PHYSICAL    Patient Identification:  Name:  Cristina Hughes  Age:  83 y.o.  Sex:  female  :  1940  MRN:  [id number]   Visit Number:  [id number]  Admit Date: 2023   Primary Care Physician:  Sunshine Prescott PA     Subjective     Chief complaint:   Chief Complaint   Patient presents with    Altered Mental Status     History of presenting illness:   Patient is a 83 y.o. female with past medical history significant for pulmonary embolism, blood clotting disorder, combined systolic and diastolic heart failure, dilated cardiomyopathy, DVT, hypertension, atrial fibrillation, pulmonary hypertension that presented to the [facility] emergency department for evaluation of altered mental status.  Was recently admitted to [facility] on 2023 due to generalized weakness and altered mental status.  At that time patient was found to have a UTI that grew VRE and was treated with oral Zyvox.  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  Stool studies were negative at that time.  Rectal tube was used on  admission.  Patient was ultimately discharged to nursing home for rehab.  Patient was sent to the ED by EMS due to altered mental status/lethargy.  On presentation to our ER, due to being altered and having dysarthria, code stroke was called, and teleneurology evaluated the patient.  All imaging of head and neck was unremarkable.  Neurology signed off and stated this was thought to be medication induced or metabolic rather than due to CVA.  Patient examined at bedside on 3 S.  RN Ainsley also present.  Patient is oriented to self and time but not location or situation.  Patient is complaining of nausea but states she has no other complaints.  Denied abdominal pain, dysuria, chest pain.  Patient was tender when palpating abdomen though.  Patient did get pleasantly confused at times during interview.    Upon arrival to the ED, vitals were temperature DNR, HR 88, RR 20, /82, SpO2 98% on 2 L.  Labs significant for creatinine 1.36, BUN 40, BUN/creatinine ratio 29.4, GFR 38.7, alk phos 357, AST 86, ALT 48, total bilirubin 1.7.  WBC 11.09, hemoglobin 11.2.  UA cloudy, moderate blood, moderate leukocytes, 21-50 RBC, too numerous WBC to count, trace bacteria, 3-6 squamous epithelial cells.    CT head shows small vessel ischemic/degenerative changes.  Head and neck CT angiogram no acute findings.  CT abdomen/pelvis shows multiple distended large bowel segments with air-fluid levels suggestive of diffuse ileus versus underlying partial distal large bowel obstruction.  chest x-ray with left lower lobe atelectasis.  Small volume of ascites in the abdomen extending to the  upper right and left colonic gutter.  Small bilateral pleural effusions.  Right lower lobe calcified granuloma.  Calcified granulomas in the spleen.  Degenerative disc disease.  No abdominal aortic aneurysm or retroperitoneal hemorrhage.  No free air or abscess or hematoma MRCP shows cholelithiasis.  No features of choledocholithiasis.  No dilated  intrahepatic or extrahepatic bile ducts or pancreatic duct.  No features of pancreatitis or cholecystitis.  Bladder ultrasound shows cholelithiasis without cholecystitis.  Minimal gallbladder wall thickening could be due to edema from underlying ascites.    Patient has been admitted to the Telemetry unit.   ---------------------------------------------------------------------------------------------------------------------   Review of Systems   Unable to perform ROS: Mental status change      ---------------------------------------------------------------------------------------------------------------------   No past medical history on file.  No past surgical history on file.  No family history on file.  Social History     Socioeconomic History    Marital status:      ---------------------------------------------------------------------------------------------------------------------   Allergies:  Patient has no known allergies.  ---------------------------------------------------------------------------------------------------------------------   Medications below are reported home medications pulling from within the system; at this time, these medications have not been reconciled unless otherwise specified and are in the verification process for further verifcation as current home medications.    Prior to Admission Medications       None          ---------------------------------------------------------------------------------------------------------------------    Objective     Hospital Scheduled Meds:  cefTRIAXone, 1,000 mg, Intravenous, Q24H  Linezolid, 600 mg, Intravenous, Q12H  miconazole, 1 application , Topical, Q12H  senna-docusate sodium, 2 tablet, Oral, BID  sodium chloride, 10 mL, Intravenous, Q12H      sodium chloride, 75 mL/hr, Last Rate: 75 mL/hr (12/23/23 4918)        Current listed hospital scheduled medications may not yet reflect those currently placed in orders that are signed and held,  awaiting patient's arrival to floor/unit.    ---------------------------------------------------------------------------------------------------------------------   Vital Signs:  Temp:  [99 °F (37.2 °C)-99.5 °F (37.5 °C)] 99.5 °F (37.5 °C)  Heart Rate:  [] 85  Resp:  [19-20] 19  BP: ()/(55-92) 110/57  Mean Arterial Pressure (Non-Invasive) for the past 24 hrs (Last 3 readings):   Noninvasive MAP (mmHg)   12/23/23 0139 73   12/23/23 0015 105   12/22/23 2315 70     SpO2 Percentage    12/22/23 2145 12/23/23 0019 12/23/23 0139   SpO2: 98% 98% 98%     SpO2:  [98 %-100 %] 98 %  on  Flow (L/min):  [2] 2;   Device (Oxygen Therapy): nasal cannula    Body mass index is 40.51 kg/m².  Wt Readings from Last 3 Encounters:   12/23/23 100 kg (221 lb 8 oz)   12/22/23 98.4 kg (217 lb)     ---------------------------------------------------------------------------------------------------------------------   Physical Exam:  Physical Exam  Exam conducted with a chaperone present (RITA Schmitz).   Constitutional:       General: She is not in acute distress.     Appearance: Normal appearance.   HENT:      Head: Normocephalic and atraumatic.      Right Ear: External ear normal.      Left Ear: External ear normal.      Nose: No nasal deformity.      Mouth/Throat:      Lips: Pink.      Mouth: Mucous membranes are moist.   Eyes:      Conjunctiva/sclera: Conjunctivae normal.      Pupils: Pupils are equal, round, and reactive to light.   Cardiovascular:      Rate and Rhythm: Normal rate. Rhythm irregularly irregular.      Heart sounds: Normal heart sounds.   Pulmonary:      Effort: Pulmonary effort is normal.      Breath sounds: Normal breath sounds. No wheezing, rhonchi or rales.   Abdominal:      General: Abdomen is protuberant. Bowel sounds are decreased. There is distension.      Palpations: Abdomen is soft.      Tenderness: There is generalized abdominal tenderness. There is no guarding or rebound.      Comments: High-pitched bowel  sounds   Genitourinary:     Comments: No mckeon catheter in place   Vaginal bleeding noted  Musculoskeletal:      Cervical back: Neck supple. Normal range of motion.      Right lower le+ Pitting Edema present.      Left lower le+ Pitting Edema present.   Skin:     General: Skin is warm and dry.   Neurological:      General: No focal deficit present.      Mental Status: She is alert. She is disoriented and confused.      Comments: Oriented to person and time, not place or situation.  Pleasantly confused   Psychiatric:         Mood and Affect: Mood normal.         Behavior: Behavior normal.       ---------------------------------------------------------------------------------------------------------------------  EKG: Atrial fibrillation.  Heart rate 85.  Confirmed by cardiology.        I have personally reviewed the EKG/Telemetry strip  ---------------------------------------------------------------------------------------------------------------------   Results from last 7 days   Lab Units 23  1626 23  1424   HSTROP T ng/L 27* 25*           Results from last 7 days   Lab Units 23  0211 23  0156 23  2201 23  1844 23  1626 23  1605 23  1239   CRP mg/dL  --   --   --   --  3.26*  --   --    LACTATE mmol/L  --  2.0 2.2* 2.4*  --    < >  --    WBC 10*3/mm3 9.70  --   --   --   --   --  11.09*   HEMOGLOBIN g/dL 10.9*  --   --   --   --   --  11.2*   HEMATOCRIT % 34.4  --   --   --   --   --  34.0   MCV fL 100.6*  --   --   --   --   --  97.4*   MCHC g/dL 31.7  --   --   --   --   --  32.9   PLATELETS 10*3/mm3 151  --   --   --   --   --  172   INR   --   --   --   --   --   --  2.81*    < > = values in this interval not displayed.     Results from last 7 days   Lab Units 23  0211 23  1626 23  1240 23  1239   SODIUM mmol/L 137  --   --  135*   POTASSIUM mmol/L 3.4*  --   --  4.0   CHLORIDE mmol/L 99  --   --  99   CO2 mmol/L 21.4*  --   --  " 24.9   BUN mg/dL 41*  --   --  40*   CREATININE mg/dL 1.29*  --  1.40* 1.36*   CALCIUM mg/dL 8.9  --   --  8.8   GLUCOSE mg/dL 99  --   --  109*   ALBUMIN g/dL 2.9*  --   --  2.9*   BILIRUBIN mg/dL 1.5* 1.6*  --  1.7*   ALK PHOS U/L 335*  --   --  357*   AST (SGOT) U/L 74*  --   --  86*   ALT (SGPT) U/L 45*  --   --  48*   Estimated Creatinine Clearance: 36.6 mL/min (A) (by C-G formula based on SCr of 1.29 mg/dL (H)).  Ammonia   Date Value Ref Range Status   12/22/2023 21 11 - 51 umol/L Final       Glucose   Date/Time Value Ref Range Status   12/22/2023 1235 87 70 - 130 mg/dL Final     No results found for: \"HGBA1C\"  No results found for: \"TSH\", \"FREET4\"    No results found for: \"BLOODCX\"  No results found for: \"URINECX\"  No results found for: \"WOUNDCX\"  No results found for: \"STOOLCX\"  No results found for: \"RESPCX\"  No results found for: \"MRSACX\"  Pain Management Panel           No data to display              I have personally reviewed the above laboratory results.   ---------------------------------------------------------------------------------------------------------------------  Imaging Results (Last 7 Days)       Procedure Component Value Units Date/Time    MRI abdomen wo contrast mrcp [[id number]] Collected: 12/22/23 2159     Updated: 12/22/23 2208    Narrative:      PROCEDURE: MRI examination of the abdomen performed without IV contrast.  The examination was performed with coronal T2 haste and axial T2 haste  and axial T2 fat saturation sequences. Additional imaging performed in  multiple planes according to an MRCP protocol for assessment of the  common bile duct. No IV contrast. 3D T2 imaging also performed.     HISTORY: Possible gallstone in the common bile duct.     COMPARISON: None.     FINDINGS:     Small right pleural effusion and small to medium size left pleural  effusion.  Mild enlarged heart size.  Small volume of free fluid in the right upper quadrant and left upper  quadrant consistent with " ascites. Free fluid extends into the right and  left colic gutter.  Multiple gallstones noted in the gallbladder lumen.  No stone identified in the common bile duct.  No features of choledocholithiasis.  The common bile duct is normal in size.  No features of cholecystitis.  No features of pancreatitis.  No acute process seen in the adrenal glands, spleen, kidneys, and  pancreas.  No dilated pancreatic duct.  No conclusive features of acute pancreatitis.  Distended configuration to large bowel segments with occasional  air-fluid levels suggestive of underlying large bowel ileus.  Small volume of free fluid noted in the lateral left abdomen.  Mild degenerative disc disease throughout the thoracic spine and lumbar  spine with slight levoconvex curvature at the thoracolumbar junction.  No abdominal aortic aneurysm or retroperitoneal hemorrhage.       Impression:         1.  Cholelithiasis.  2.  No features of choledocholithiasis.  3.  No dilated intrahepatic or extrahepatic bile ducts.  4.  No dilated pancreatic duct.  5.  No features of pancreatitis or cholecystitis.  6.  Bilateral pleural effusions, left greater than right with features  of compressive atelectasis at the lung bases.  7.  Small volume of ascites within the abdomen.  8.  Air-fluid levels in the large bowel segments possibly due to  underlying ileus.  9.  Possible small size hiatal hernia.  10.  No acute process seen in the liver, pancreas, adrenal glands,  kidneys, and spleen.        This report was finalized on 12/22/2023 10:05 PM by Jaden Stephens MD.       US Gallbladder [[id number]] Collected: 12/22/23 1828     Updated: 12/22/23 1832    Narrative:      PROCEDURE: Right upper quadrant gallbladder abdominal ultrasound  evaluation performed on December 22, 2023. Color flow imaging performed.     HISTORY: Cholelithiasis. Evaluate gallbladder.     COMPARISON: None.     FINDINGS:     Multiple gallstones present in the gallbladder lumen.  The gallbladder  wall measures 3.4 mm in thickness.  No pericholecystic fluid.  The common bile duct measures 4.2 mm in diameter.  Patent main portal vein with appropriate direction of flow.       Impression:         1.  Cholelithiasis.  2.  No features of cholecystitis.  3.  Minimal gallbladder wall thickening up to 3.4 mm could be due to  mild edema from underlying ascites.  4.  No pericystic fluid identified  5.  Normal common bile duct.  6.  Normal main portal vein.     This report was finalized on 12/22/2023 6:30 PM by Jaden Stephens MD.       CT Abdomen Pelvis Without Contrast [[id number]] Collected: 12/22/23 1823     Updated: 12/22/23 1830    Narrative:      PROCEDURE: CT of the abdomen and pelvis performed on December 22, 2023.  The examination was performed with 4 mm axial imaging and sagittal and  coronal reconstruction images. The examination was performed according  to as low as reasonably achievable dose protocol. Total DLP = 2041.     HISTORY: Abdominal pain. Acute onset symptoms. Nonlocalized.     COMPARISON: None.     FINDINGS:     Mild enlarged heart size.  Small right and left pleural effusion, left greater than right with  associated compressive atelectasis in the lung bases.  Calcified granulomas in the spleen.  Cholelithiasis.  Small volume of free fluid in the abdomen and there is a small volume of  free fluid along the right and left colic gutter.  No features of cholecystitis or pancreatitis.  No acute process seen in the kidneys.  No abdominal aortic aneurysm or retroperitoneal hemorrhage.  Multilevel degenerative disc disease throughout the lumbar spine with  endplate and facet hypertrophic changes.  Mild osteoarthritis at the right and left hip joint.  Distended configuration to large bowel segments throughout the abdomen  and pelvis with multiple air-fluid levels suggestive of diffuse ileus  versus underlying partial distal large bowel obstruction.  No herniated bowel segment.  No acute process seen in the  uterus.  No acute process seen in the bladder.       Impression:         1.  Multiple distended large bowel segments with air-fluid levels  throughout the colon suggestive of diffuse ileus versus underlying  partial distal large bowel obstruction.  2.  The appendix is not identified.  3.  Cholelithiasis.  4.  Small volume of ascites in the abdomen extending to the upper right  and left colic gutter.  5.  Small bilateral pleural effusions, left greater than right with  compressive atelectasis at the lung bases.  6.  Right lower lobe calcified granuloma.  7.  Calcified granulomas in the spleen.  8.  Multilevel degenerative disc disease throughout the lumbar spine  with endplate and facet hypertrophic changes.  9.  No abdominal aortic aneurysm or retroperitoneal hemorrhage.  10.  No free air, abscess, or hematoma.     This report was finalized on 12/22/2023 6:28 PM by Jaden Stephens MD.       MRI Brain Without Contrast [[id number]] Collected: 12/22/23 1507     Updated: 12/22/23 1512    Narrative:      EXAM:    MR Head Without Intravenous Contrast     EXAM DATE:    12/22/2023 2:24 PM     CLINICAL HISTORY:    stroke protocol     TECHNIQUE:    Magnetic resonance images of the head/brain without intravenous  contrast in multiple planes.     COMPARISON:    No relevant prior studies available.     FINDINGS:    BRAIN AND EXTRA-AXIAL SPACES:  Abnormal T2 signal in the deep cerebral  white matter is consistent with small vessel ischemic/degenerative  changes.  The cerebral and cerebellar sulci are prominent consistent  with brain atrophy.  No hemorrhage.    BONES/JOINTS:  Unremarkable as visualized.    SINUSES:  Unremarkable as visualized.  No acute sinusitis.    MASTOID AIR CELLS:  Unremarkable as visualized.  No mastoid effusion.    ORBITS:  Unremarkable as visualized.       Impression:      1.  Small vessel ischemic/degenerative changes.  2.  Cerebral and cerebellar atrophy.        This report was finalized on 12/22/2023 3:10  PM by Dr. Harry Gross MD.       XR Chest 1 View [[id number]] Collected: 12/22/23 1420     Updated: 12/22/23 1422    Narrative:      EXAM:    XR Chest, 1 View     EXAM DATE:    12/22/2023 1:37 PM     CLINICAL HISTORY:    Acute Stroke Protocol (onset < 12 hrs)     TECHNIQUE:    Frontal view of the chest.     COMPARISON:    No relevant prior studies available.     FINDINGS:    LUNGS AND PLEURAL SPACES:  Left lower lobe linear atelectasis or  minimal airspace opacity noted.  Coarsened interstitial markings noted  throughout the lungs.  No pneumothorax.    HEART:  Unremarkable as visualized.  No cardiomegaly.    MEDIASTINUM:  Unremarkable as visualized.    BONES/JOINTS:  Unremarkable as visualized.       Impression:        Left lower lobe linear atelectasis or minimal airspace opacity noted.        This report was finalized on 12/22/2023 2:20 PM by Dr. Harry Gross MD.       CT CEREBRAL PERFUSION WITH & WITHOUT CONTRAST [[id number]] Collected: 12/22/23 1307     Updated: 12/22/23 1310    Narrative:      EXAM:    CT Brain Perfusion Without and With Intravenous Contrast, [email or web address]  Protocol     EXAM DATE:    12/22/2023 1:03 PM     CLINICAL HISTORY:    Neuro deficit, acute, stroke suspected     TECHNIQUE:    Axial computed tomography images of the brain without and with  intravenous contrast using cerebral perfusion protocol.  Post-processing  parametric maps were created using [email or web address].AI software and reviewed.   Sagittal and coronal reformatted images were created and reviewed.  This  CT exam was performed using one or more of the following dose reduction  techniques:  automated exposure control, adjustment of the mA and/or kV  according to patient size, and/or use of iterative reconstruction  technique.     COMPARISON:    None.     FINDINGS:    ARTERIAL INPUT FUNCTION:  Optimal.    ESTIMATED INFARCT CORE, RCBF < 30%: 0    PERFUSION, TMAX > 6S:  12.    PERFUSION, TMAX > 10S:  0.    MISMATCH VOLUME:  0.    MISMATCH RATIO:   N/A.       BRAIN AND EXTRA-AXIAL SPACES:  No intracranial hemorrhage.       Impression:      No core infarct detected.     This report was finalized on 12/22/2023 1:08 PM by Dr. Harry Gross MD.       CT Angiogram Neck [[id number]] Collected: 12/22/23 1306     Updated: 12/22/23 1309    Narrative:      EXAM:    CT Angiography Neck With Intravenous Contrast     EXAM DATE:    12/22/2023 1:02 PM     CLINICAL HISTORY:    Stroke, follow up     TECHNIQUE:    Axial computed tomographic angiography images of the neck with  intravenous contrast.  This CT exam was performed using one or more of  the following dose reduction techniques:  automated exposure control,  adjustment of the mA and/or kV according to patient size, and/or use of  iterative reconstruction technique.    MIP reconstructed images were created and reviewed.     COMPARISON:    None.     FINDINGS:      VASCULATURE:    RIGHT COMMON CAROTID ARTERY:  Unremarkable as visualized.  No  occlusion or significant stenosis.  No dissection.    RIGHT INTERNAL CAROTID ARTERY:  Mild bilateral ICA atherosclerotic  calcific stenosis.  No dissection.    RIGHT EXTERNAL CAROTID ARTERY:  Unremarkable as visualized.  No  occlusion.    RIGHT VERTEBRAL ARTERY:  Unremarkable as visualized.  No occlusion or  significant stenosis.  No dissection.       LEFT COMMON CAROTID ARTERY:  Unremarkable as visualized.  No occlusion  or significant stenosis.  No dissection.    LEFT INTERNAL CAROTID ARTERY:  See above.    LEFT EXTERNAL CAROTID ARTERY:  Unremarkable as visualized.  No  occlusion.    LEFT VERTEBRAL ARTERY:  The cervical portion of the left vertebral  artery may be occluded or very diminutive.  No occlusion or significant  stenosis.      NECK:    BONES/JOINTS:  Unremarkable as visualized.    SOFT TISSUES:  Unremarkable as visualized.    LUNG APICES:  Clear.      CAROTID STENOSIS REFERENCE USING NASCET CRITERIA:    % ICA stenosis = (1 - narrowest ICA diameter/diameter of  distal  cervical ICA) x 100.    Mild - <50% stenosis.    Moderate - 50-69% stenosis.    Severe - 70-94% stenosis.    Near occlusion - 95-99% stenosis.    Occluded - 100% stenosis.       Impression:      1.  The cervical portion of the left vertebral artery may be occluded or  very diminutive.  2.  Mild bilateral ICA atherosclerotic calcific stenosis.        This report was finalized on 12/22/2023 1:07 PM by Dr. Harry Gross MD.       CT Angiogram Head w AI Analysis of LVO [[id number]] Collected: 12/22/23 1304     Updated: 12/22/23 1308    Narrative:      EXAM:    CT Angiography Head With Intravenous Contrast     EXAM DATE:    12/22/2023 1:01 PM     CLINICAL HISTORY:    Stroke, follow up     TECHNIQUE:    Axial computed tomographic angiography images of the head with  intravenous contrast. LVO analysis was performed with [email or web address] software.   This CT exam was performed using one or more of the following dose  reduction techniques:  automated exposure control, adjustment of the mA  and/or kV according to patient size, and/or use of iterative  reconstruction technique.    MIP reconstructed images were created and reviewed.     COMPARISON:    No relevant prior studies available.     FINDINGS:    RIGHT INTERNAL CAROTID ARTERY:  No acute findings.  Intracranial  segment is patent with no significant stenosis.  No aneurysm.    RIGHT ANTERIOR CEREBRAL ARTERY:  Unremarkable as visualized.  No  occlusion or significant stenosis.  No aneurysm.    RIGHT MIDDLE CEREBRAL ARTERY:  Unremarkable as visualized.  No  occlusion or significant stenosis.  No aneurysm.    RIGHT POSTERIOR CEREBRAL ARTERY:  Unremarkable as visualized.  No  occlusion or significant stenosis.  No aneurysm.    RIGHT VERTEBRAL ARTERY:  Unremarkable as visualized.       LEFT INTERNAL CAROTID ARTERY:  No acute findings.  Intracranial  segment is patent with no significant stenosis.  No aneurysm.    LEFT ANTERIOR CEREBRAL ARTERY:  Unremarkable as visualized.   No  occlusion or significant stenosis.  No aneurysm.    LEFT MIDDLE CEREBRAL ARTERY:  Unremarkable as visualized.  No  occlusion or significant stenosis.  No aneurysm.    LEFT POSTERIOR CEREBRAL ARTERY:  Unremarkable as visualized.  No  occlusion or significant stenosis.  No aneurysm.    LEFT VERTEBRAL ARTERY:  Unremarkable as visualized.       BASILAR ARTERY:  Unremarkable as visualized.  No occlusion or  significant stenosis.  No aneurysm.       Impression:        No acute findings in the arteries of the head/brain.        This report was finalized on 12/22/2023 1:06 PM by Dr. Harry Gross MD.       CT Head Without Contrast Stroke Protocol [[id number]] Collected: 12/22/23 1302     Updated: 12/22/23 1305    Narrative:      EXAM:    CT Head Without Intravenous Contrast     EXAM DATE:    12/22/2023 12:29 PM     CLINICAL HISTORY:    Neuro deficit, acute stroke suspected     TECHNIQUE:    Axial computed tomography images of the head/brain without intravenous  contrast.  Sagittal and coronal reformatted images were created and  reviewed.  This CT exam was performed using one or more of the following  dose reduction techniques:  automated exposure control, adjustment of  the mA and/or kV according to patient size, and/or use of iterative  reconstruction technique.     COMPARISON:    No relevant prior studies available.     FINDINGS:    BRAIN AND EXTRA-AXIAL SPACES:  Areas of decreased attenuation in the  deep cerebral white matter are consistent with small vessel  ischemic/degenerative changes.  No hemorrhage.    BONES/JOINTS:  Unremarkable as visualized.  No acute fracture.    SOFT TISSUES:  Unremarkable as visualized.    SINUSES:  Unremarkable as visualized.  No acute sinusitis.    MASTOID AIR CELLS:  Unremarkable as visualized.  No mastoid effusion.       Impression:        Small vessel ischemic/degenerative changes.        This report was finalized on 12/22/2023 1:03 PM by Dr. Harry Gross MD.             I have  personally reviewed the above radiology results.     Last Echocardiogram:    ---------------------------------------------------------------------------------------------------------------------    Assessment & Plan      Active Hospital Problems    Diagnosis  POA    **Acute encephalopathy [G93.40]  Yes     Acute metabolic encephalopathy, POA  Acute Urinary Tract Infection, POA  History of VRE  Imaging of head unremarkable.  UA showed ketones, WBCs, bacteria  Continue Ceftriaxone and linezolid due to recent VRE, follow up cultures  Patient does not  meet sepsis criteria at this time. Continue to monitor vital signs.  Bowel Obstruction, POA   Cholelithiasis without cholecystitis, POA  Ascites, POA  Transaminitis, POA   Hyperbilirubinemia, POA  CT abdomen/pelvis shows dilated loops of bowel, ileus versus large bowel obstruction.  Also notes small volume ascites.  Gallbladder ultrasound and MRCP showed cholelithiasis without evidence of cholecystitis or choledocholithiasis.  Patient recently had dilated bowel on admission in November at [facility].  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  General surgery consulted while in ED, agrees to consult patient on admission.  Assistance appreciated  Slightly elevated transaminases.  Hepatitis panel negative.  Acetaminophen level negative.    Acute Kidney Injury   Baseline Cr 0.76 based on labs from last admission at [facility], was up to 1.40 on admission  Continue mIVFs, Trend Cr and urine output, avoid nephrotoxins, NSAIDs, dehydration and contrast as able.  Repeat BMP in the a.m.     Elevated troponin, POA  Likely secondary to ANGELICA   Initial HS troponin 25, elevated creatinine, repeat 27  Obtain updated TTE in the a.m  continuous cardiac monitoring    Abnormal uterine bleeding  Patient had transvaginal ultrasound on 11/27/23 was significant for abnormal thickening of endometrium.  Endometrial carcinoma cannot be excluded.  At that time patient was  not interested in workup and would not pursue any treatment.    CHRONIC MEDICAL PROBLEMS    Atrial fibrillation  History of pulmonary embolism  History of DVT  Blood clotting disorder  Restart home anticoagulation and rate control medicine pending formal pharmacy med rec  Continuous cardiac monitoring  Combined systolic and diastolic heart failure  Dilated cardiomyopathy   Pulmonary hypertension  TTE in the a.m.. Monitor daily weights and strict I's and O's. Monitor for signs symptoms of volume overload    Essential hypertension  BP appears well controlled   Plan to resume home antihypertensive regimen once reconciled per pharmacy with appropriate holding parameters to prevent hypotension and/or bradycardia   Closely monitor BP per hospital protocol, titrate medications as necessary    F/E/N: IV NS 75 mL/h.  Continue monitor electrolytes.  NPO.    ---------------------------------------------------  DVT Prophylaxis: Home anticoagulation  Activity: Up with assistance    The patient is considered to be a high risk patient due to: Encephalopathy, bowel obstruction, advanced age    INPATIENT status due to the need for care which can only be reasonably provided in an hospital setting such as aggressive/expedited ancillary services and/or consultation services, the necessity for IV medications, close physician monitoring and/or the possible need for procedures.  In such, I feel patient’s risk for adverse outcomes and need for care warrant INPATIENT evaluation and predict the patient’s care encounter to likely last beyond 2 midnights.      Code Status: Full CODE     Disposition/Discharge planning: Pending clinical course.  Likely back to nursing home on discharge.    I have discussed the patient's assessment and plan with Dr. Vaughan.      Georgette Briseno PA-C  Hospitalist Service -- [facility]       12/23/23  02:57 EST    Attending Physician: Gustavo Vaughan MD        Electronically signed by Clement  Gustavo Hope MD at 12/23/23 0451       Lines, Drains & Airways       Active LDAs       Name Placement date Placement time Site Days    Peripheral IV 12/23/23 0240 Anterior;Right Forearm 12/23/23  0240  Forearm  4                  Current Facility-Administered Medications   Medication Dose Route Frequency Provider Last Rate Last Admin    apixaban (ELIQUIS) tablet 5 mg  5 mg Oral Q12H Yvonne Medrano DO   5 mg at 12/27/23 0916    aspirin EC tablet 81 mg  81 mg Oral Daily Yvonne Medrano DO   81 mg at 12/27/23 0916    sennosides-docusate (PERICOLACE) 8.6-50 MG per tablet 2 tablet  2 tablet Oral BID Gustavo aVughan MD        And    polyethylene glycol (MIRALAX) packet 17 g  17 g Oral Daily PRN Gustavo Vaughan MD        And    bisacodyl (DULCOLAX) EC tablet 5 mg  5 mg Oral Daily PRN Gustavo Vaughan MD        And    bisacodyl (DULCOLAX) suppository 10 mg  10 mg Rectal Daily PRN Gustavo Vaughan MD        digoxin (LANOXIN) tablet 125 mcg  125 mcg Oral Once per day on Mon Wed Fri Georgette Briseno PA-C   125 mcg at 12/27/23 0916    doxycycline (VIBRAMYCIN) 100 mg in sodium chloride 0.9 % 100 mL IVPB-VTB  100 mg Intravenous Q12H Kirill Fernandez DO   100 mg at 12/27/23 0136    ipratropium-albuterol (DUO-NEB) nebulizer solution 3 mL  3 mL Nebulization 4x Daily - RT Kirill Fernandez DO   3 mL at 12/27/23 0711    metoprolol tartrate (LOPRESSOR) tablet 50 mg  50 mg Oral Q12H Georgette Briseno PA-C   50 mg at 12/24/23 2103    miconazole (MICOTIN) 2 % powder 1 application   1 application  Topical Q12H Georgette Briseno PA-C   1 application  at 12/27/23 0854    midodrine (PROAMATINE) tablet 2.5 mg  2.5 mg Oral Q8H PRN Kirill Fernandez DO        nitroglycerin (NITROSTAT) SL tablet 0.4 mg  0.4 mg Sublingual Q5 Min PRN Gustavo Vaughan MD        ondansetron (ZOFRAN) injection 4 mg  4 mg Intravenous Q6H PRN Georgette Briseno PA-C        piperacillin-tazobactam (ZOSYN) IVPB 4.5 g  in 100 mL NS VTB  4.5 g Intravenous Q8H Kirill Fernandez, DO   4.5 g at 12/27/23 1139    promethazine (PHENERGAN) tablet 6.25 mg  6.25 mg Oral BID Kirill Fernandez DO        sodium chloride 0.9 % flush 10 mL  10 mL Intravenous PRN Gustavo Vaughan MD        sodium chloride 0.9 % flush 10 mL  10 mL Intravenous Q12H Gustavo Vaughan MD   10 mL at 12/27/23 0854    sodium chloride 0.9 % flush 10 mL  10 mL Intravenous PRN Gustavo Vaughan MD        sodium chloride 0.9 % infusion 40 mL  40 mL Intravenous Gustavo Anderson MD         Lab Results (most recent)       Procedure Component Value Units Date/Time    CBC & Differential [[id number]]  (Abnormal) Collected: 12/27/23 0136    Specimen: Blood Updated: 12/27/23 0406    Narrative:      The following orders were created for panel order CBC & Differential.  Procedure                               Abnormality         Status                     ---------                               -----------         ------                     CBC Auto Differential[[id number]]        Abnormal            Final result               Scan Slide[[id number]]                                       Final result                 Please view results for these tests on the individual orders.    CBC Auto Differential [[id number]]  (Abnormal) Collected: 12/27/23 0136    Specimen: Blood Updated: 12/27/23 0406     WBC 8.65 10*3/mm3      RBC 3.16 10*6/mm3      Hemoglobin 10.2 g/dL      Hematocrit 32.2 %      .9 fL      MCH 32.3 pg      MCHC 31.7 g/dL      RDW 23.9 %      RDW-SD 88.9 fl      MPV 10.5 fL      Platelets 127 10*3/mm3      Neutrophil % 63.3 %      Lymphocyte % 23.2 %      Monocyte % 7.9 %      Eosinophil % 4.3 %      Basophil % 0.8 %      Immature Grans % 0.5 %      Neutrophils, Absolute 5.48 10*3/mm3      Lymphocytes, Absolute 2.01 10*3/mm3      Monocytes, Absolute 0.68 10*3/mm3      Eosinophils, Absolute 0.37 10*3/mm3      Basophils, Absolute 0.07  10*3/mm3      Immature Grans, Absolute 0.04 10*3/mm3      nRBC 0.0 /100 WBC     Scan Slide [[id number]] Collected: 12/27/23 0136    Specimen: Blood Updated: 12/27/23 0406     Anisocytosis Large/3+     Hypochromia Slight/1+     Poikilocytes Slight/1+     Platelet Morphology Normal    Basic Metabolic Panel [[id number]]  (Abnormal) Collected: 12/27/23 0136    Specimen: Blood Updated: 12/27/23 0351     Glucose 87 mg/dL      BUN 27 mg/dL      Creatinine 1.33 mg/dL      Sodium 138 mmol/L      Potassium 3.9 mmol/L      Comment: Slight hemolysis detected by analyzer. Result may be falsely elevated.        Chloride 105 mmol/L      CO2 17.5 mmol/L      Calcium 8.3 mg/dL      BUN/Creatinine Ratio 20.3     Anion Gap 15.5 mmol/L      eGFR 39.8 mL/min/1.73     Narrative:      GFR Normal >60  Chronic Kidney Disease <60  Kidney Failure <15    The GFR formula is only valid for adults with stable renal function between ages 18 and 70.    Blood Culture - Blood, Arm, Right [[id number]]  (Normal) Collected: 12/22/23 1619    Specimen: Blood from Arm, Right Updated: 12/26/23 1631     Blood Culture No growth at 4 days    Blood Culture - Blood, Arm, Left [[id number]]  (Normal) Collected: 12/22/23 1605    Specimen: Blood from Arm, Left Updated: 12/26/23 1631     Blood Culture No growth at 4 days    Protime-INR [[id number]]  (Abnormal) Collected: 12/26/23 0842    Specimen: Blood Updated: 12/26/23 0939     Protime 24.1 Seconds      INR 2.09    Narrative:      Suggested INR therapeutic range for stable oral anticoagulant therapy:    Low Intensity therapy:   1.5-2.0  Moderate Intensity therapy:   2.0-3.0  High Intensity therapy:   2.5-4.0    Lactic Acid, Plasma [[id number]]  (Abnormal) Collected: 12/26/23 0842    Specimen: Blood Updated: 12/26/23 0921     Lactate 3.3 mmol/L     CBC & Differential [[id number]]  (Abnormal) Collected: 12/26/23 0230    Specimen: Blood Updated: 12/26/23 0337    Narrative:      The following orders were created for panel  order CBC & Differential.  Procedure                               Abnormality         Status                     ---------                               -----------         ------                     CBC Auto Differential[[id number]]        Abnormal            Final result               Scan Slide[[id number]]                                       Final result                 Please view results for these tests on the individual orders.    CBC Auto Differential [[id number]]  (Abnormal) Collected: 12/26/23 0230    Specimen: Blood Updated: 12/26/23 0337     WBC 8.47 10*3/mm3      RBC 2.94 10*6/mm3      Hemoglobin 9.4 g/dL      Hematocrit 29.9 %      .7 fL      MCH 32.0 pg      MCHC 31.4 g/dL      RDW 23.8 %      RDW-SD 87.3 fl      MPV 10.7 fL      Platelets 119 10*3/mm3      Neutrophil % 62.9 %      Lymphocyte % 21.7 %      Monocyte % 8.6 %      Eosinophil % 5.4 %      Basophil % 0.9 %      Immature Grans % 0.5 %      Neutrophils, Absolute 5.32 10*3/mm3      Lymphocytes, Absolute 1.84 10*3/mm3      Monocytes, Absolute 0.73 10*3/mm3      Eosinophils, Absolute 0.46 10*3/mm3      Basophils, Absolute 0.08 10*3/mm3      Immature Grans, Absolute 0.04 10*3/mm3      nRBC 0.0 /100 WBC     Scan Slide [[id number]] Collected: 12/26/23 0230    Specimen: Blood Updated: 12/26/23 0337     Anisocytosis Large/3+     Hypochromia Slight/1+     Macrocytes Slight/1+     Platelet Morphology Normal    Basic Metabolic Panel [[id number]]  (Abnormal) Collected: 12/26/23 0230    Specimen: Blood Updated: 12/26/23 0259     Glucose 108 mg/dL      BUN 28 mg/dL      Creatinine 1.31 mg/dL      Sodium 137 mmol/L      Potassium 3.9 mmol/L      Comment: Specimen hemolyzed.  Result may be falsely elevated.        Chloride 103 mmol/L      CO2 20.4 mmol/L      Calcium 8.2 mg/dL      BUN/Creatinine Ratio 21.4     Anion Gap 13.6 mmol/L      eGFR 40.5 mL/min/1.73     Narrative:      GFR Normal >60  Chronic Kidney Disease <60  Kidney Failure <15    The GFR  "formula is only valid for adults with stable renal function between ages 18 and 70.    Ferritin [[id number]]  (Abnormal) Collected: 12/25/23 1552    Specimen: Blood Updated: 12/25/23 1701     Ferritin 483.10 ng/mL     Narrative:      Results may be falsely decreased if patient taking Biotin.      Iron Profile [[id number]]  (Abnormal) Collected: 12/25/23 1552    Specimen: Blood Updated: 12/25/23 1655     Iron 188 mcg/dL      Iron Saturation (TSAT) 83 %      Transferrin 152 mg/dL      TIBC 226 mcg/dL     Reticulocytes [[id number]]  (Normal) Collected: 12/25/23 1555    Specimen: Blood Updated: 12/25/23 1652     Reticulocyte % 1.03 %      Reticulocyte Absolute 0.0299 10*6/mm3     Procalcitonin [[id number]]  (Normal) Collected: 12/25/23 1552    Specimen: Blood Updated: 12/25/23 1643     Procalcitonin 0.15 ng/mL     Narrative:      As a Marker for Sepsis (Non-Neonates):    1. <0.5 ng/mL represents a low risk of severe sepsis and/or septic shock.  2. >2 ng/mL represents a high risk of severe sepsis and/or septic shock.    As a Marker for Lower Respiratory Tract Infections that require antibiotic therapy:    PCT on Admission    Antibiotic Therapy       6-12 Hrs later    >0.5                Strongly Recommended  >0.25 - <0.5        Recommended   0.1 - 0.25          Discouraged              Remeasure/reassess PCT  <0.1                Strongly Discouraged     Remeasure/reassess PCT    As 28 day mortality risk marker: \"Change in Procalcitonin Result\" (>80% or <=80%) if Day 0 (or Day 1) and Day 4 values are available. Refer to http://www.[employer]-pct-calculator.com    Change in PCT <=80%  A decrease of PCT levels below or equal to 80% defines a positive change in PCT test result representing a higher risk for 28-day all-cause mortality of patients diagnosed with severe sepsis for septic shock.    Change in PCT >80%  A decrease of PCT levels of more than 80% defines a negative change in PCT result representing a lower risk for 28-day " all-cause mortality of patients diagnosed with severe sepsis or septic shock.       Lactic Acid, Plasma [[id number]]  (Abnormal) Collected: 12/25/23 1552    Specimen: Blood Updated: 12/25/23 1636     Lactate 3.1 mmol/L     C-reactive Protein [[id number]]  (Abnormal) Collected: 12/25/23 1552    Specimen: Blood Updated: 12/25/23 1633     C-Reactive Protein 3.87 mg/dL     Protime-INR [[id number]]  (Abnormal) Collected: 12/25/23 1552    Specimen: Blood Updated: 12/25/23 1621     Protime 33.1 Seconds      INR 3.14    Narrative:      Suggested INR therapeutic range for stable oral anticoagulant therapy:    Low Intensity therapy:   1.5-2.0  Moderate Intensity therapy:   2.0-3.0  High Intensity therapy:   2.5-4.0    Blood Gas, Arterial With Co-Ox [[id number]]  (Abnormal) Collected: 12/25/23 1606    Specimen: Arterial Blood Updated: 12/25/23 1614     Site Right Radial     Paul's Test Positive     pH, Arterial 7.408 pH units      pCO2, Arterial 35.8 mm Hg      pO2, Arterial 168.0 mm Hg      Comment: 83 Value above reference range        HCO3, Arterial 22.6 mmol/L      Base Excess, Arterial -1.8 mmol/L      O2 Saturation, Arterial >99.2 %      Comment: 93 Value above reportable range > 100.0        Hemoglobin, Blood Gas 9.3 g/dL      Comment: 84 Value below reference range        Hematocrit, Blood Gas 28.5 %      Comment: 84 Value below reference range        Oxyhemoglobin 98.5 %      Methemoglobin 0.30 %      Carboxyhemoglobin 1.3 %      A-a DO2 8.6 mmHg      CO2 Content 23.7 mmol/L      Barometric Pressure for Blood Gas 723 mmHg      Modality Nasal Cannula     FIO2 32 %      Flow Rate 3.0 lpm      Ventilator Mode NA     Collected by [id number]     Comment: Meter: [id number]     :  [id number]        pH, Temp Corrected --     pCO2, Temperature Corrected --     pO2, Temperature Corrected --    Sedimentation Rate [[id number]]  (Normal) Collected: 12/25/23 1555    Specimen: Blood Updated: 12/25/23 1612     Sed Rate 6 mm/hr      Hepatic Function Panel [[id number]]  (Abnormal) Collected: 12/25/23 0057    Specimen: Blood Updated: 12/25/23 1559     Total Protein 5.7 g/dL      Albumin 2.7 g/dL      ALT (SGPT) 38 U/L      AST (SGOT) 54 U/L      Alkaline Phosphatase 245 U/L      Total Bilirubin 1.0 mg/dL      Bilirubin, Direct 0.6 mg/dL      Bilirubin, Indirect 0.4 mg/dL     Magnesium [[id number]]  (Normal) Collected: 12/25/23 0057    Specimen: Blood Updated: 12/25/23 0134     Magnesium 2.3 mg/dL     CBC (No Diff) [[id number]]  (Abnormal) Collected: 12/25/23 0057    Specimen: Blood Updated: 12/25/23 0121     WBC 8.74 10*3/mm3      RBC 3.15 10*6/mm3      Hemoglobin 10.1 g/dL      Hematocrit 31.2 %      MCV 99.0 fL      MCH 32.1 pg      MCHC 32.4 g/dL      RDW 23.2 %      RDW-SD 82.9 fl      MPV 10.2 fL      Platelets 132 10*3/mm3     Magnesium [[id number]]  (Normal) Collected: 12/24/23 1511    Specimen: Blood Updated: 12/24/23 1546     Magnesium 2.4 mg/dL     CBC (No Diff) [[id number]]  (Abnormal) Collected: 12/24/23 0701    Specimen: Blood Updated: 12/24/23 0729     WBC 8.52 10*3/mm3      RBC 3.39 10*6/mm3      Hemoglobin 10.9 g/dL      Hematocrit 32.7 %      MCV 96.5 fL      MCH 32.2 pg      MCHC 33.3 g/dL      RDW 23.2 %      RDW-SD 80.6 fl      MPV 9.8 fL      Platelets 126 10*3/mm3     Urine Culture - Urine, Urine, Clean Catch [[id number]]  (Abnormal) Collected: 12/22/23 1317    Specimen: Urine, Clean Catch Updated: 12/23/23 1304     Urine Culture Yeast isolated     Comment: No further workup.       Narrative:      Colonization of the urinary tract without infection is common. Treatment is discouraged unless the patient is symptomatic, pregnant, or undergoing an invasive urologic procedure.    Comprehensive Metabolic Panel [[id number]]  (Abnormal) Collected: 12/23/23 0211    Specimen: Blood Updated: 12/23/23 0237     Glucose 99 mg/dL      BUN 41 mg/dL      Creatinine 1.29 mg/dL      Sodium 137 mmol/L      Potassium 3.4 mmol/L      Chloride 99  "mmol/L      CO2 21.4 mmol/L      Calcium 8.9 mg/dL      Total Protein 6.2 g/dL      Albumin 2.9 g/dL      ALT (SGPT) 45 U/L      AST (SGOT) 74 U/L      Alkaline Phosphatase 335 U/L      Total Bilirubin 1.5 mg/dL      Globulin 3.3 gm/dL      A/G Ratio 0.9 g/dL      BUN/Creatinine Ratio 31.8     Anion Gap 16.6 mmol/L      eGFR 41.3 mL/min/1.73     Narrative:      GFR Normal >60  Chronic Kidney Disease <60  Kidney Failure <15    The GFR formula is only valid for adults with stable renal function between ages 18 and 70.    Procalcitonin [[id number]]  (Normal) Collected: 12/23/23 0156    Specimen: Blood Updated: 12/23/23 0232     Procalcitonin 0.14 ng/mL     Narrative:      As a Marker for Sepsis (Non-Neonates):    1. <0.5 ng/mL represents a low risk of severe sepsis and/or septic shock.  2. >2 ng/mL represents a high risk of severe sepsis and/or septic shock.    As a Marker for Lower Respiratory Tract Infections that require antibiotic therapy:    PCT on Admission    Antibiotic Therapy       6-12 Hrs later    >0.5                Strongly Recommended  >0.25 - <0.5        Recommended   0.1 - 0.25          Discouraged              Remeasure/reassess PCT  <0.1                Strongly Discouraged     Remeasure/reassess PCT    As 28 day mortality risk marker: \"Change in Procalcitonin Result\" (>80% or <=80%) if Day 0 (or Day 1) and Day 4 values are available. Refer to http://www.[employer]-pct-calculator.com    Change in PCT <=80%  A decrease of PCT levels below or equal to 80% defines a positive change in PCT test result representing a higher risk for 28-day all-cause mortality of patients diagnosed with severe sepsis for septic shock.    Change in PCT >80%  A decrease of PCT levels of more than 80% defines a negative change in PCT result representing a lower risk for 28-day all-cause mortality of patients diagnosed with severe sepsis or septic shock.       Hepatitis Panel, Acute [[id number]]  (Normal) Collected: 12/23/23 0156    " Specimen: Blood Updated: 12/23/23 0232     Hepatitis B Surface Ag Non-Reactive     Hep A IgM Non-Reactive     Hep B C IgM Non-Reactive     Hepatitis C Ab Non-Reactive    Narrative:      Results may be falsely decreased if patient taking Biotin.     STAT Lactic Acid, Reflex [[id number]]  (Normal) Collected: 12/23/23 0156    Specimen: Blood Updated: 12/23/23 0219     Lactate 2.0 mmol/L     Acetaminophen Level [[id number]]  (Normal) Collected: 12/22/23 1424    Specimen: Blood from Arm, Right Updated: 12/23/23 0140     Acetaminophen <5.0 mcg/mL     C-reactive Protein [[id number]]  (Abnormal) Collected: 12/22/23 1626    Specimen: Blood from Arm, Left Updated: 12/23/23 0039     C-Reactive Protein 3.26 mg/dL     STAT Lactic Acid, Reflex [[id number]]  (Abnormal) Collected: 12/22/23 2201    Specimen: Blood from Arm, Left Updated: 12/22/23 2230     Lactate 2.2 mmol/L     Ammonia [[id number]]  (Normal) Collected: 12/22/23 1844    Specimen: Blood from Arm, Right Updated: 12/22/23 1907     Ammonia 21 umol/L     Bilirubin, Total & Direct [[id number]]  (Abnormal) Collected: 12/22/23 1626    Specimen: Blood from Arm, Left Updated: 12/22/23 1806     Total Bilirubin 1.6 mg/dL      Bilirubin, Direct 0.9 mg/dL      Bilirubin, Indirect 0.7 mg/dL     High Sensitivity Troponin T [[id number]]  (Abnormal) Collected: 12/22/23 1626    Specimen: Blood from Arm, Left Updated: 12/22/23 1709     HS Troponin T 27 ng/L     Narrative:      High Sensitive Troponin T Reference Range:  <14.0 ng/L- Negative Female for AMI  <22.0 ng/L- Negative Male for AMI  >=14 - Abnormal Female indicating possible myocardial injury.  >=22 - Abnormal Male indicating possible myocardial injury.   Clinicians would have to utilize clinical acumen, EKG, Troponin, and serial changes to determine if it is an Acute Myocardial Infarction or myocardial injury due to an underlying chronic condition.         POC Creatinine [[id number]]  (Abnormal) Collected: 12/22/23 1240    Specimen:  Blood Updated: 12/22/23 1650     Creatinine 1.40 mg/dL      Comment: Serial Number: [id number]:  [id number]       Single High Sensitivity Troponin T [[id number]]  (Abnormal) Collected: 12/22/23 1424    Specimen: Blood from Arm, Right Updated: 12/22/23 1452     HS Troponin T 25 ng/L     Narrative:      High Sensitive Troponin T Reference Range:  <14.0 ng/L- Negative Female for AMI  <22.0 ng/L- Negative Male for AMI  >=14 - Abnormal Female indicating possible myocardial injury.  >=22 - Abnormal Male indicating possible myocardial injury.   Clinicians would have to utilize clinical acumen, EKG, Troponin, and serial changes to determine if it is an Acute Myocardial Infarction or myocardial injury due to an underlying chronic condition.         [place] Draw [[id number]] Collected: 12/22/23 1239    Specimen: Blood from Arm, Left Updated: 12/22/23 1431    Narrative:      The following orders were created for panel order [place] Draw.  Procedure                               Abnormality         Status                     ---------                               -----------         ------                     Green Top (Gel)[[id number]]                                  Final result               Lavender Top[[id number]]                                     Final result               Gold Top - SST[[id number]]                                   Final result               Light Blue Top[[id number]]                                   Final result                 Please view results for these tests on the individual orders.    Green Top (Gel) [[id number]] Collected: 12/22/23 1324    Specimen: Blood from Arm, Left Updated: 12/22/23 1431     Extra Tube Hold for add-ons.     Comment: Auto resulted.       Urinalysis, Microscopic Only - Urine, Clean Catch [[id number]]  (Abnormal) Collected: 12/22/23 1317    Specimen: Urine, Clean Catch Updated: 12/22/23 1354     RBC, UA 21-50 /HPF      WBC, UA Too Numerous to Count /HPF      Bacteria, UA Trace  /HPF      Squamous Epithelial Cells, UA 3-6 /HPF      Yeast, UA       Moderate/2+ Budding Yeast w/Hyphae     /HPF     Hyaline Casts, UA None Seen /LPF      Methodology Manual Light Microscopy    Lavender Top [[id number]] Collected: 12/22/23 1239    Specimen: Blood from Arm, Left Updated: 12/22/23 1345     Extra Tube hold for add-on     Comment: Auto resulted       Gold Top - SST [[id number]] Collected: 12/22/23 1239    Specimen: Blood from Arm, Left Updated: 12/22/23 1345     Extra Tube Hold for add-ons.     Comment: Auto resulted.       Light Blue Top [[id number]] Collected: 12/22/23 1239    Specimen: Blood from Arm, Left Updated: 12/22/23 1345     Extra Tube Hold for add-ons.     Comment: Auto resulted       Urinalysis With Culture If Indicated - Urine, Clean Catch [[id number]]  (Abnormal) Collected: 12/22/23 1317    Specimen: Urine, Clean Catch Updated: 12/22/23 1336     Color, UA Yellow     Appearance, UA Cloudy     pH, UA 5.5     Specific Gravity, UA 1.029     Glucose, UA Negative     Ketones, UA Negative     Bilirubin, UA Negative     Blood, UA Moderate (2+)     Protein, UA Negative     Leuk Esterase, UA Moderate (2+)     Nitrite, UA Negative     Urobilinogen, UA 0.2 E.U./dL    Narrative:      In absence of clinical symptoms, the presence of pyuria, bacteria, and/or nitrites on the urinalysis result does not correlate with infection.    Comprehensive Metabolic Panel [[id number]]  (Abnormal) Collected: 12/22/23 1239    Specimen: Blood from Arm, Left Updated: 12/22/23 1314     Glucose 109 mg/dL      BUN 40 mg/dL      Creatinine 1.36 mg/dL      Sodium 135 mmol/L      Potassium 4.0 mmol/L      Comment: Slight hemolysis detected by analyzer. Result may be falsely elevated.        Chloride 99 mmol/L      CO2 24.9 mmol/L      Calcium 8.8 mg/dL      Total Protein 5.9 g/dL      Albumin 2.9 g/dL      ALT (SGPT) 48 U/L      AST (SGOT) 86 U/L      Alkaline Phosphatase 357 U/L      Total Bilirubin 1.7 mg/dL      Globulin 3.0  gm/dL      A/G Ratio 1.0 g/dL      BUN/Creatinine Ratio 29.4     Anion Gap 11.1 mmol/L      eGFR 38.7 mL/min/1.73     Narrative:      GFR Normal >60  Chronic Kidney Disease <60  Kidney Failure <15    The GFR formula is only valid for adults with stable renal function between ages 18 and 70.    aPTT [[id number]]  (Abnormal) Collected: 12/22/23 1239    Specimen: Blood from Arm, Left Updated: 12/22/23 1258     PTT 40.4 seconds     Narrative:      PTT Heparin Therapeutic Range:  59 - 95 seconds      POC Glucose Once [[id number]]  (Normal) Collected: 12/22/23 1235    Specimen: Blood Updated: 12/22/23 1242     Glucose 87 mg/dL           Orders (last 24 hrs)        Start     Ordered    12/27/23 1245  promethazine (PHENERGAN) tablet 6.25 mg  2 Times Daily         12/27/23 1147    12/27/23 1148  Transfer Patient  Once         12/27/23 1148    12/27/23 1148  Discontinue Cardiac Monitoring  Once         12/27/23 1148    12/27/23 0600  CBC Auto Differential  PROCEDURE ONCE         12/26/23 2201    12/27/23 0406  Scan Slide  Once         12/27/23 0405    12/26/23 2000  piperacillin-tazobactam (ZOSYN) IVPB 4.5 g in 100 mL NS VTB  Every 8 Hours         12/26/23 1307    12/26/23 1900  ipratropium-albuterol (DUO-NEB) nebulizer solution 3 mL  4 Times Daily - RT         12/26/23 1530    12/26/23 1500  cefTRIAXone (ROCEPHIN) 1,000 mg in sodium chloride 0.9 % 100 mL IVPB-VTB  Every 24 Hours,   Status:  Discontinued         12/25/23 1620    12/26/23 1403  Diet: Regular/House Diet; Feeding Assistance - Nursing; Texture: Soft to Chew (NDD 3); Soft to Chew: Chopped Meat; Fluid Consistency: Thin (IDDSI 0)  Diet Effective Now         12/26/23 1402    12/26/23 1400  piperacillin-tazobactam (ZOSYN) IVPB 3.375 g in 100 mL NS VTB  Every 6 Hours,   Status:  Discontinued         12/26/23 1303    12/26/23 1400  doxycycline (VIBRAMYCIN) 100 mg in sodium chloride 0.9 % 100 mL IVPB-VTB  Every 12 Hours         12/26/23 1303    12/26/23 1400   piperacillin-tazobactam (ZOSYN) IVPB 4.5 g in 100 mL NS VTB  Once         12/26/23 1307    12/26/23 1313  midodrine (PROAMATINE) tablet 2.5 mg  Every 8 Hours PRN         12/26/23 1314    12/26/23 1303  Clostridioides difficile Toxin - Stool, Per Rectum  Once         12/26/23 1303    12/26/23 1303  Clostridioides difficile Toxin, PCR - Stool, Per Rectum  PROCEDURE ONCE         12/26/23 1303    12/26/23 1245  sodium chloride 0.9 % bolus 1,000 mL  Once         12/26/23 1152    12/26/23 0600  CBC & Differential  Daily       12/25/23 1835    12/26/23 0600  Basic Metabolic Panel  Daily       12/25/23 1835    12/25/23 0900  digoxin (LANOXIN) tablet 125 mcg  Once per day on Mon Wed Fri 12/23/23 0401    12/24/23 0800  Neuro Checks  Every Shift      Comments: On arrival and handoff, increase frequency as clinically indicated or for thrombolytic administration, otherwise Q2 hours. Documentation of arrival assessment and any neuro change required.    12/23/23 2044 12/23/23 0900  sennosides-docusate (PERICOLACE) 8.6-50 MG per tablet 2 tablet  2 Times Daily        [facility] for [facility] Report.    12/23/23 0148    12/23/23 0900  miconazole (MICOTIN) 2 % powder 1 application   Every 12 Hours Scheduled         12/23/23 0251    12/23/23 0900  apixaban (ELIQUIS) tablet 5 mg  Every 12 Hours Scheduled         12/23/23 0401    12/23/23 0900  aspirin EC tablet 81 mg  Daily         12/23/23 0401    12/23/23 0900  metoprolol tartrate (LOPRESSOR) tablet 50 mg  Every 12 Hours Scheduled         12/23/23 0401    12/23/23 0800  Oral Care  2 Times Daily       12/23/23 0148    12/23/23 0245  sodium chloride 0.9 % flush 10 mL  Every 12 Hours Scheduled         12/23/23 0148    12/23/23 0245  sodium chloride 0.9 % infusion  Continuous,   Status:  Discontinued         12/23/23 0148    12/23/23 0219  ondansetron (ZOFRAN) injection 4 mg  Every 6 Hours PRN         12/23/23 0219    12/23/23 0149  Daily Weights  Daily        12/23/23 0148    12/23/23 0148  sodium chloride 0.9 % flush 10 mL  As Needed         12/23/23 0148    12/23/23 0148  sodium chloride 0.9 % infusion 40 mL  As Needed         12/23/23 0148 12/23/23 0148  polyethylene glycol (MIRALAX) packet 17 g  Daily PRN        See [facility] for full Linked Orders Report.    12/23/23 0148 12/23/23 0148  bisacodyl (DULCOLAX) EC tablet 5 mg  Daily PRN        See [facility] for full Linked Orders Report.    12/23/23 0148 12/23/23 0148  bisacodyl (DULCOLAX) suppository 10 mg  Daily PRN        See [facility] for full Linked Orders Report.    12/23/23 0148 12/23/23 0148  nitroglycerin (NITROSTAT) SL tablet 0.4 mg  Every 5 Minutes PRN         12/23/23 0148 12/23/23 0100  Linezolid (ZYVOX) 600 mg 300 mL  Every 12 Hours,   Status:  Discontinued         12/23/23 0020    12/22/23 1226  sodium chloride 0.9 % flush 10 mL  As Needed         12/22/23 1226    12/22/23 0000  cephalexin (KEFLEX) 500 MG capsule  2 Times Daily         12/22/23 1736    Unscheduled  Oxygen Therapy- Nasal Cannula; Titrate 1-6 LPM Per SpO2; 90 - 95%  Continuous PRN       12/22/23 1226    Unscheduled  Up With Assistance  As Needed       12/23/23 0148    --  aspirin 81 MG EC tablet  Daily         12/23/23 0312    --  furosemide (LASIX) 40 MG tablet  Daily         12/23/23 0312    --  digoxin (LANOXIN) 125 MCG tablet  3 Times Weekly         12/23/23 0312    --  linaclotide (Linzess) 145 MCG capsule capsule  Every Morning Before Breakfast         12/23/23 0312    --  mirtazapine (REMERON) 15 MG tablet  Nightly         12/23/23 0312    --  apixaban (ELIQUIS) 5 MG tablet tablet  Every 12 Hours Scheduled         12/23/23 0312    --  cefdinir (OMNICEF) 300 MG capsule  2 Times Daily         12/23/23 0312    --  metoprolol tartrate (LOPRESSOR) 50 MG tablet  Every 12 Hours         12/23/23 0312    --  ipratropium-albuterol (DUO-NEB) 0.5-2.5 mg/3 ml nebulizer  Every 6 Hours PRN         12/23/23 0312    --  SCANNED -  TELEMETRY           23 0000    --  SCANNED - TELEMETRY           23 0000    --  SCANNED - TELEMETRY           23 0000    --  SCANNED - TELEMETRY           23 0000    --  SCANNED - TELEMETRY           23 0000    --  SCANNED - TELEMETRY           23 0000    --  SCANNED - TELEMETRY           23 0000    --  SCANNED - TELEMETRY           23 0000    --  SCANNED - TELEMETRY           23 0000                     Physician Progress Notes (most recent note)        Kirill Fernandez DO at 23 1133              [facility]     Patient Identification:  Name:  Cristina Hughes  Age:  83 y.o.  Sex:  female  :  1940  MRN:  [id number]  Visit Number:  [id number]  ROOM: [address] [place] [address]     Primary Care Provider:  Sunshine Prescott PA    Length of stay in inpatient status:  4    Subjective     Chief Compliant:    Chief Complaint   Patient presents with    Altered Mental Status       History of Presenting Illness:    Patient seen in follow-up for encephalopathy and presumptive urinary tract infection.  Patient has remained hemodynamically stable.  She is more awake and interactive this morning, pleasant and answers questions appropriately.  She denied any pain or symptoms but stated she occasionally did get nauseous.  Denies any abdominal pain.  Afebrile.  No adverse events noted overnight.    Objective     Current Hospital Meds:apixaban, 5 mg, Oral, Q12H  aspirin, 81 mg, Oral, Daily  digoxin, 125 mcg, Oral, Once per day on   doxycycline, 100 mg, Intravenous, Q12H  ipratropium-albuterol, 3 mL, Nebulization, 4x Daily - RT  metoprolol tartrate, 50 mg, Oral, Q12H  miconazole, 1 application , Topical, Q12H  piperacillin-tazobactam, 4.5 g, Intravenous, Q8H  promethazine, 6.25 mg, Oral, BID  senna-docusate sodium, 2 tablet, Oral, BID  sodium chloride, 10 mL, Intravenous, Q12H           Current Antimicrobial  Therapy:  Anti-Infectives (From admission, onward)      Ordered     Dose/Rate Route Frequency Start Stop    12/26/23 1307  piperacillin-tazobactam (ZOSYN) IVPB 4.5 g in 100 mL NS VTB        Ordering Provider: Kirill Fernandez DO    4.5 g  over 4 Hours Intravenous Every 8 Hours 12/26/23 2000 01/02/24 1959    12/26/23 1303  doxycycline (VIBRAMYCIN) 100 mg in sodium chloride 0.9 % 100 mL IVPB-VTB        Ordering Provider: Kirill Fernandez DO    100 mg  over 60 Minutes Intravenous Every 12 Hours 12/26/23 1400 01/02/24 1359    12/26/23 1307  piperacillin-tazobactam (ZOSYN) IVPB 4.5 g in 100 mL NS VTB        Ordering Provider: Kirill Fernandez DO    4.5 g  over 30 Minutes Intravenous Once 12/26/23 1400 12/26/23 1534    12/22/23 1454  cefTRIAXone (ROCEPHIN) 1,000 mg in sodium chloride 0.9 % 100 mL IVPB-VTB        Ordering Provider: Milo Narayanan DO    1,000 mg  200 mL/hr over 30 Minutes Intravenous Once 12/22/23 1510 12/22/23 1652    12/22/23 1736  cephalexin (KEFLEX) 500 MG capsule        Ordering Provider: Milo Narayanan DO    500 mg Oral 2 Times Daily 12/22/23 0000            Current Diuretic Therapy:  Diuretics (From admission, onward)      None          ----------------------------------------------------------------------------------------------------------------------  Vital Signs:  Temp:  [96.4 °F (35.8 °C)-97.4 °F (36.3 °C)] 97.4 °F (36.3 °C)  Heart Rate:  [] 96  Resp:  [18-20] 18  BP: ()/(47-78) 116/66  SpO2:  [93 %-98 %] 98 %  on  Flow (L/min):  [3] 3;   Device (Oxygen Therapy): nasal cannula  Body mass index is 42.59 kg/m².    Wt Readings from Last 3 Encounters:   12/27/23 106 kg (232 lb 14.4 oz)   12/22/23 98.4 kg (217 lb)     Intake & Output (last 3 days)         12/24 0701 12/25 0700 12/25 0701 12/26 0700 12/26 0701 12/27 0700 12/27 0701  12/28 0700    P.O.  0 60 120    Total Intake(mL/kg)  0 (0) 60 (0.6) 120 (1.1)    Urine (mL/kg/hr)        Stool        Total  Output        Net  0 +60 +120            Urine Unmeasured Occurrence 5 x 4 x 1 x     Stool Unmeasured Occurrence 4 x 4 x 3 x           Diet: Regular/House Diet; Feeding Assistance - Nursing; Texture: Soft to Chew (NDD 3); Soft to Chew: Chopped Meat; Fluid Consistency: Thin (IDDSI 0)  ----------------------------------------------------------------------------------------------------------------------  Physical exam:  Constitutional: Elderly, chronically ill-appearing female, resting comfortably in bed, or awake, alert and interactive today no acute distress.      HENT:  Head:  Normocephalic and atraumatic.  Mouth:  Moist mucous membranes.    Eyes:  Conjunctivae and EOM are normal. No scleral icterus.   Cardiovascular:  Normal rate, regular rhythm and normal heart sounds with no murmur. No JVD.   Pulmonary/Chest:  No respiratory distress, no wheezes, no crackles, with normal breath sounds and good air movement. Unlabored. No accessory muscle use.  Abdominal:  Soft. No distension and no tenderness.  Bowel sounds present. No rebound or guarding.   Musculoskeletal:  No tenderness, and no deformity.  No red or swollen joints anywhere.    Neurological:  No cranial nerve deficit.   Nonfocal.   Skin:  Skin is warm and dry. No rash noted. No pallor.   Peripheral vascular:  No clubbing, no cyanosis, no edema. Pedal and tibial pulses 2 out of 4 bilaterally.     ----------------------------------------------------------------------------------------------------------------------  Results from last 7 days   Lab Units 12/27/23  0136 12/26/23  0842 12/26/23  0230 12/25/23  1552 12/25/23  0057 12/23/23  0211 12/23/23  0156 12/22/23  1844 12/22/23  1626 12/22/23  1605 12/22/23  1239   CRP mg/dL  --   --   --  3.87*  --   --   --   --  3.26*  --   --    LACTATE mmol/L  --  3.3*  --  3.1*  --   --  2.0   < >  --    < >  --    WBC 10*3/mm3 8.65  --  8.47  --  8.74   < >  --   --   --   --  11.09*   HEMOGLOBIN g/dL 10.2*  --  9.4*  --   "10.1*   < >  --   --   --   --  11.2*   HEMATOCRIT % 32.2*  --  29.9*  --  31.2*   < >  --   --   --   --  34.0   MCV fL 101.9*  --  101.7*  --  99.0*   < >  --   --   --   --  97.4*   MCHC g/dL 31.7  --  31.4*  --  32.4   < >  --   --   --   --  32.9   PLATELETS 10*3/mm3 127*  --  119*  --  132*   < >  --   --   --   --  172   INR   --  2.09*  --  3.14*  --   --   --   --   --   --  2.81*    < > = values in this interval not displayed.     Results from last 7 days   Lab Units 12/25/23  1606   PH, ARTERIAL pH units 7.408   PO2 ART mm Hg 168.0*   PCO2, ARTERIAL mm Hg 35.8   HCO3 ART mmol/L 22.6     Results from last 7 days   Lab Units 12/27/23  0136 12/26/23  0230 12/25/23  0057 12/24/23  1511 12/24/23  0701 12/23/23  0211 12/22/23  1626 12/22/23  1240 12/22/23  1239   SODIUM mmol/L 138 137 139  --    < > 137  --   --  135*   POTASSIUM mmol/L 3.9 3.9 3.6  --    < > 3.4*  --   --  4.0   MAGNESIUM mg/dL  --   --  2.3 2.4  --   --   --   --   --    CHLORIDE mmol/L 105 103 104  --    < > 99  --   --  99   CO2 mmol/L 17.5* 20.4* 26.0  --    < > 21.4*  --   --  24.9   BUN mg/dL 27* 28* 30*  --    < > 41*  --   --  40*   CREATININE mg/dL 1.33* 1.31* 1.22*  --    < > 1.29*  --    < > 1.36*   CALCIUM mg/dL 8.3* 8.2* 8.5*  --    < > 8.9  --   --  8.8   GLUCOSE mg/dL 87 108* 97  --    < > 99  --   --  109*   ALBUMIN g/dL  --   --  2.7*  --   --  2.9*  --   --  2.9*   BILIRUBIN mg/dL  --   --  1.0  --   --  1.5* 1.6*  --  1.7*   ALK PHOS U/L  --   --  245*  --   --  335*  --   --  357*   AST (SGOT) U/L  --   --  54*  --   --  74*  --   --  86*   ALT (SGPT) U/L  --   --  38*  --   --  45*  --   --  48*    < > = values in this interval not displayed.   Estimated Creatinine Clearance: 36.7 mL/min (A) (by C-G formula based on SCr of 1.33 mg/dL (H)).  No results found for: \"AMMONIA\"    Results from last 7 days   Lab Units 12/22/23  1626 12/22/23  1424   HSTROP T ng/L 27* 25*             No results found for: \"HGBA1C\", \"POCGLU\"  No " "results found for: \"TSH\", \"FREET4\"  No results found for: \"PREGTESTUR\", \"PREGSERUM\", \"HCG\", \"HCGQUANT\"  Pain Management Panel           No data to display              Brief Urine Lab Results  (Last result in the past 365 days)        Color   Clarity   Blood   Leuk Est   Nitrite   Protein   CREAT   Urine HCG        12/22/23 1317 Yellow   Cloudy   Moderate (2+)   Moderate (2+)   Negative   Negative                 Blood Culture   Date Value Ref Range Status   12/22/2023 No growth at 2 days  Preliminary   12/22/2023 No growth at 2 days  Preliminary     Urine Culture   Date Value Ref Range Status   12/22/2023 Yeast isolated (A)  Final     Comment:     No further workup.     No results found for: \"WOUNDCX\"  No results found for: \"STOOLCX\"  No results found for: \"RESPCX\"  No results found for: \"AFBCX\"  Results from last 7 days   Lab Units 12/26/23  0842 12/25/23  1555 12/25/23  1552 12/23/23  0156 12/22/23  2201 12/22/23  1844 12/22/23  1626 12/22/23  1605   PROCALCITONIN ng/mL  --   --  0.15 0.14  --   --   --   --    LACTATE mmol/L 3.3*  --  3.1* 2.0 2.2* 2.4*  --  2.9*   SED RATE mm/hr  --  6  --   --   --   --   --   --    CRP mg/dL  --   --  3.87*  --   --   --  3.26*  --        I have personally looked at the labs and they are summarized above.  ----------------------------------------------------------------------------------------------------------------------  Detailed radiology reports for the last 24 hours:  Imaging Results (Last 24 Hours)       Procedure Component Value Units Date/Time    FL Barium Enema Single Contrast [[id number]] Collected: 12/26/23 1105     Updated: 12/26/23 1324    Narrative:      EXAMINATION: [place] BARIUM ENEMA SINGLE-CONTRAST-      CLINICAL INDICATION: evaluate for rectal obstruciton; N39.0-Urinary  tract infection, site not specified; R41.82-Altered mental status,  unspecified        COMPARISON: CT scan performed the same day.     PROCEDURE:  Somewhat limited exam due to patient's " inability to be moved.     Tip was placed in the rectum and contrast was injected in the standard  retrograde fashion under fluoroscopy with total fluoroscopy time of 2.06  minutes.     No obstruction was identified to flow in the left colon all the way to  the distal transverse colon.     No intraluminal mass was seen.     No extrinsic deviation of the colon.       Impression:      No evidence of obstruction in the left colon on today's exam.  The right colon and proximal half of the transverse colon not imaged.        This report was finalized on 12/26/2023 1:22 PM by Dr. Abhinav Riley MD.             Assessment & Plan      Patient is an 83-year-old female with history significant for PE, dilated cardiomyopathy, atrial fibrillation with pulmonary hypertension who presented to the ER with reports of AMS per family.    #Acute metabolic encephalopathy  #Sepsis d/t suspected acute urinary tract infection, POA  --Patient presented with reports of AMS per family, recently admitted to [facility] on 11/26 due to weakness and AMS-at that time was diagnosed with VRE and completed a course of oral Zyvox.  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  Intermittently hypotensive, hard to arouse again today but then when family arrived she stayed awake and talk to them as they visited  --As urine culture is negative, antibiotics broadened to Zosyn to include cover potential colitis noted on CT  --Patient is more awake and alert this morning and able to participate in conversation, looks improved from prior days.  If she  remains hemodynamically stable, potentially back to the nursing home in 24 to 48 hours.    #Possible colitis  #Ileus versus partial large bowel obstruction  #Acute hepatitis  #Direct hyperbilirubinemia  --CT abdomen/pelvis noted multiple distended large bowel segments consistent of ileus versus partial distal large bowel obstruction, small volume of ascites  --MRCP without any features of choledocholithiasis, cholelithiasis noted  --Initial elevation in transaminases and direct hyperbilirubinemia, but these are currently improving  --Repeat CT abdomen pelvis noted mild colonic distention with fluid and stool-filled, thickening of the right colon wall may represent colitis  --Acute hepatitis panel negative  --No nausea or vomiting or GI symptoms  --Multiple bowel movements her last several days, no evidence of ileus or SBO, general surgery following, plan for outpatient scope  --Continue Zosyn for intra-abdominal coverage    #Dementia without behavioral disturbances   --I discussed with son at length on 12/26, noted decline over the past year after  had passed.  Noted prior to any acute illnesses while at home she would be very forgetful and hallucinate-he stated she would see people at home in her house    #Cardiomyopathy  #Essential hypertension  #Atrial fibrillation  --Rate controlled, documented cardiomyopathy both systolic/diastolic however echo only notes diastolic dysfunction, none previous on file  --Echo noted EF is 66 to 70%, mild pulmonary hypertension  --Continue Eliquis for stroke prophylaxis  --Continue Lopressor and dig with holding parameters if able to take p.o.  --Follow-up with cardiology outpatient    #Moderate protein and albumin malnutrition  #Bilateral pleural effusions, left greater than right  #History of pulmonary embolism    CHECKLIST:  Abx: Zosyn  VTE: Eliquis  GI ppx: None  Diet: Consistent carb  Code: CPR, full  Dispo: Patient seems to be improving, more awake and interactive  today.  If she remains hemodynamically stable and all cultures remain negative, possibly discharge back to the nursing facility in 24 to 48 hours.    Kirill Fernandez DO  [facility]  12/27/23  11:48 EST      Electronically signed by Kirill Fernandez DO at 12/27/23 1148          Consult Notes (most recent note)        Josselin Avalos MD at 12/23/23 1028        Consult Orders    1. Inpatient General Surgery Consult [[id number]] ordered by Gustavo Vaughan MD at 12/22/23 1916                 Patient Name:  Cristina Hughes  YOB: 1940  [id number]       Patient Care Team:  Sunshine Prescott PA as PCP - General (Family Medicine)      General Surgery Consult Note     Date of Consultation: 12/23/23    Consulting Physician : Yvonne Medrano DO    Reason for Consult : large bowel obstrucition    Subjective     I have been asked to see  Cristina Hughes , a 83 y.o. female in consultation for concern for large bowel obstruction. She is a difficult historian and is unable to communicate. Per chart review, presented to ER due to somnolence and CT imaging concerning for large bowel obstruction. She was able to respond that she does not have abdominal pain this morning. She has been having multiple episodes of incontinence of liquid stool.       Allergy: No Known Allergies    Medications:  [Held by provider] apixaban, 5 mg, Oral, Q12H  [Held by provider] aspirin, 81 mg, Oral, Daily  cefTRIAXone, 1,000 mg, Intravenous, Q24H  [START ON 12/25/2023] digoxin, 125 mcg, Oral, Once per day on Mon Wed Fri  Linezolid, 600 mg, Intravenous, Q12H  metoprolol tartrate, 50 mg, Oral, Q12H  miconazole, 1 application , Topical, Q12H  senna-docusate sodium, 2 tablet, Oral, BID  sodium chloride, 10 mL, Intravenous, Q12H      sodium chloride, 75 mL/hr, Last Rate: 75 mL/hr (12/23/23 0248)      No current facility-administered medications on file prior to encounter.     Current Outpatient  "Medications on File Prior to Encounter   Medication Sig    apixaban (ELIQUIS) 5 MG tablet tablet Take 1 tablet by mouth Every 12 (Twelve) Hours.    aspirin 81 MG EC tablet Take 1 tablet by mouth Daily.    cefdinir (OMNICEF) 300 MG capsule Take 1 capsule by mouth 2 (Two) Times a Day.    digoxin (LANOXIN) 125 MCG tablet Take 1 tablet by mouth 3 (Three) Times a Week.    furosemide (LASIX) 40 MG tablet Take 1 tablet by mouth Daily.    linaclotide (Linzess) 145 MCG capsule capsule Take 1 capsule by mouth Every Morning Before Breakfast.    metoprolol tartrate (LOPRESSOR) 50 MG tablet Take 1 tablet by mouth Every 12 (Twelve) Hours.    ipratropium-albuterol (DUO-NEB) 0.5-2.5 mg/3 ml nebulizer Take 3 mL by nebulization Every 6 (Six) Hours As Needed for Wheezing.    mirtazapine (REMERON) 15 MG tablet Take 1 tablet by mouth Every Night.       PMHx:   Past Medical History:   Diagnosis Date    Atrial fibrillation     CHF (congestive heart failure)     Hypertension        Past Surgical History, Social Hx, Surgical Hx:  Unknown due to mental condition         Objective     Physical Exam:      Vital Signs  /43 (BP Location: Right leg, Patient Position: Lying)   Pulse 110   Temp 99.4 °F (37.4 °C) (Axillary)   Resp 20   Ht 157.5 cm (62\")   Wt 100 kg (221 lb 8 oz) Comment: new admit  SpO2 97%   BMI 40.51 kg/m²     Intake/Output Summary (Last 24 hours) at 12/23/2023 1029  Last data filed at 12/23/2023 0435  Gross per 24 hour   Intake 150 ml   Output --   Net 150 ml         Physical Exam:    Head: Normocephalic, atraumatic.   Mouth: No lesions noted  CV: Regular rate and rhythm   Lungs: Bilateral chest rise and fall, no use of accessory muscles   Abdomen: Soft, nontender, nondistended  Extremities:  No cyanosis, clubbing or edema bilaterally   Neurologic: Somnolent  Rectal exam: tight anal sphincter, no palpable mass on exam (not well tolerated exam)      Results Review: I have personally reviewed all of the recent lab and " imaging results available at this time: CT scan with liquid stool in the colon, no evidence of filling of the rectum           Assessment and Plan:    Problem List Items Addressed This Visit    None  Visit Diagnoses       Acute UTI    -  Primary    Relevant Medications    cefTRIAXone (ROCEPHIN) 1,000 mg in sodium chloride 0.9 % 100 mL IVPB-VTB (Completed)    cephalexin (KEFLEX) 500 MG capsule    cefTRIAXone (ROCEPHIN) 1,000 mg in sodium chloride 0.9 % 100 mL IVPB-VTB (Start on 2023  3:00 PM)    cefdinir (OMNICEF) 300 MG capsule    Altered mental status, unspecified altered mental status type                 Active Hospital Problems    Diagnosis  POA    **Acute encephalopathy [G93.40]  Yes      Resolved Hospital Problems   No resolved problems to display.     Ms Hughes is an 82 yo F w/ concerns for a partial large bowel obstruction. She is not fully obstructed as she has been having bowel movements. There does not appear to be impacted stool in her rectum on the CT scan. Digital rectal exam did not reveal an obstructing mass within the reach of my finger. Will plan for barium enema for evaluation for more proximal obstructive process.     I discussed the patient's findings and my recommendations with the patient and/or family, as well as the primary team     Josselin Avalos MD  23  10:29 EST          Electronically signed by Josselin Avalos MD at 23 1033       Physical Therapy Notes (most recent note)    No notes exist for this encounter.          Occupational Therapy Notes (most recent note)        Caroline Mulligan OT at 23 1522          Patient Name: Cristina Hughes  : 1940    MRN: [id number]                              Today's Date: 2023       Admit Date: 2023    Visit Dx:     ICD-10-CM ICD-9-CM   1. Acute UTI  N39.0 599.0   2. Altered mental status, unspecified altered mental status type  R41.82 780.97     Patient Active Problem List   Diagnosis    Acute  encephalopathy     Past Medical History:   Diagnosis Date    Atrial fibrillation     CHF (congestive heart failure)     Hypertension      History reviewed. No pertinent surgical history.   General Information       Row Name 12/26/23 1514          OT Time and Intention    Document Type evaluation  -     Mode of Treatment individual therapy;occupational therapy  -       Row Name 12/26/23 1514          General Information    Patient Profile Reviewed yes  -     Prior Level of Function --  pt did not provide subjective history; per chart/staff, resides with son, she was able to feed self with set-up; pt with recent hospitalization at [facility] 11/26 and was in rehab at skilled nursing facility prior to current hospitalization  -     Existing Precautions/Restrictions fall  -     Barriers to Rehab previous functional deficit  -       Row Name 12/26/23 1514          Occupational Profile    Reason for Services/Referral (Occupational Profile) Patient was admitted to [facility] on 12/22/2023. She was referred for OT evaluation due to change in functional performance with ADLs, functional mobility, and/or transfers.  -       Row Name 12/26/23 1514          Living Environment    People in Home child(demarcus), adult  -       Row Name 12/26/23 1514          Cognition    Orientation Status (Cognition) unable/difficult to assess;refused to attempt  -       Row Name 12/26/23 1514          Safety Issues, Functional Mobility    Impairments Affecting Function (Mobility) strength;endurance/activity tolerance  -               User Key  (r) = Recorded By, (t) = Taken By, (c) = Cosigned By      Initials Name Provider Type     Caroline Mulligan, JAEML Occupational Therapist                     Mobility/ADL's       Row Name 12/26/23 1516          Bed Mobility    Bed Mobility bed mobility (all) activities  -     All Activities, [place] (Bed Mobility) dependent (less than 25% patient effort)  -       Row Name  12/26/23 1516          Transfers    Comment, (Transfers) unable to safely attempt due to decreased participation with activities from supine level  -[facility] Name 12/26/23 1516          Activities of Daily Living    BADL Assessment/Intervention bathing;upper body dressing;lower body dressing;grooming;toileting  -[facility] Name 12/26/23 1516          Bathing Assessment/Intervention    [place] Level (Bathing) bathing skills;dependent (less than 25% patient effort)  -[facility] Name 12/26/23 1516          Upper Body Dressing Assessment/Training    [place] Level (Upper Body Dressing) upper body dressing skills;dependent (less than 25% patient effort)  -[facility] Name 12/26/23 1516          Lower Body Dressing Assessment/Training    [place] Level (Lower Body Dressing) lower body dressing skills;dependent (less than 25% patient effort)  -[facility] Name 12/26/23 1516          Grooming Assessment/Training    [place] Level (Grooming) grooming skills;dependent (less than 25% patient effort)  -[facility] Name 12/26/23 1516          Toileting Assessment/Training    [place] Level (Toileting) toileting skills;dependent (less than 25% patient effort)  -               User Key  (r) = Recorded By, (t) = Taken By, (c) = Cosigned By      Initials Name Provider Type     Caroline Mulligan OT Occupational Therapist                   Obj/Interventions       [facility] Name 12/26/23 1517          Sensory Assessment (Somatosensory)    Sensory Assessment (Somatosensory) unable/difficult to assess  -[facility] Name 12/26/23 1517          Vision Assessment/Intervention    Vision Assessment Comment opened eyes to name X1, able to focus on therapist appropriately while eyes open  -[facility] Name 12/26/23 1517          Range of Motion Comprehensive    Comment, General Range of Motion AAROM/PROM WFLs  -[facility] Name 12/26/23 1517          Strength Comprehensive (MMT)    Comment, General Manual Muscle  Testing (MMT) Assessment proximally 1/5 in BUEs, poor plus grasp in bilateral hands  -       Row Name 12/26/23 1517          Motor Skills    Motor Skills functional endurance  -     Functional Endurance poor  -[facility] Name 12/26/23 1517          Balance    Comment, Balance unable to assess  -               User Key  (r) = Recorded By, (t) = Taken By, (c) = Cosigned By      Initials Name Provider Type     Caroline Mulligan OT Occupational Therapist                   Goals/Plan       [facility] Name 12/26/23 1521          Transfer Goal 1 (OT)    Activity/Assistive Device (Transfer Goal 1, OT) toilet;commode, 3-in-1  -KP     Ray Level/Cues Needed (Transfer Goal 1, OT) maximum assist (25-49% patient effort)  -     Time Frame (Transfer Goal 1, OT) by discharge  -[facility] Name 12/26/23 1521          Bathing Goal 1 (OT)    Activity/Device (Bathing Goal 1, OT) bathing skills, all  -KP     Ray Level/Cues Needed (Bathing Goal 1, OT) maximum assist (25-49% patient effort)  -     Time Frame (Bathing Goal 1, OT) by discharge  -[facility] Name 12/26/23 1521          Strength Goal 1 (OT)    Strength Goal 1 (OT) Patient will demonstrate 2+/5 or better in BUEs to promote functional performance with daily routine.  -     Time Frame (Strength Goal 1, OT) by discharge  -[facility] Name 12/26/23 1521          Therapy Assessment/Plan (OT)    Planned Therapy Interventions (OT) activity tolerance training;BADL retraining;passive ROM/stretching;occupation/activity based interventions;strengthening exercise;ROM/therapeutic exercise;patient/caregiver education/training  -               User Key  (r) = Recorded By, (t) = Taken By, (c) = Cosigned By      Initials Name Provider Type    Caroline Bethea OT Occupational Therapist                   Clinical Impression       Row Name 12/26/23 1518          Pain Assessment    Pretreatment Pain Rating 0/10 - no pain  -     Posttreatment Pain Rating 0/10 - no  pain  -       Row Name 12/26/23 1518          Plan of Care Review    Plan of Care Reviewed With patient  -     Progress no change  -     Outcome Evaluation Patient seen for OT evaluation. She was in semi-fowlers position and opened eyes X1. She was able to follow commands for strength and movement testing, but dependent for all other tasks with patient lying in bed with eyes closed. She did not speak verbally with therapist, but per other staff was observed and heard to speak with family earlier on this date. She would benefit from OT services by a licensed therapist to promote highest level of independence and safety to return to prior level of function and home with son.  -       Row Name 12/26/23 1518          Therapy Assessment/Plan (OT)    Patient/Family Therapy Goal Statement (OT) patient did not state  -     Rehab Potential (OT) fair, will monitor progress closely  motivation towards therapy  -     Criteria for Skilled Therapeutic Interventions Met (OT) yes;meets criteria;skilled treatment is necessary  [facility]     Therapy Frequency (OT) 3 times/wk  3-5x/week as able and available to promote functional progress  -     Predicted Duration of Therapy Intervention (OT) discharge  -       Row Name 12/26/23 1518          Therapy Plan Review/Discharge Plan (OT)    Anticipated Discharge Disposition (OT) skilled nursing facility  -       Row Name 12/26/23 1518          Positioning and Restraints    Pre-Treatment Position in bed  -     Post Treatment Position bed  -     In Bed fowlers;call light within reach;encouraged to call for assist;exit alarm on  -               User Key  (r) = Recorded By, (t) = Taken By, (c) = Cosigned By      Initials Name Provider Type    Caroline Bethea, OT Occupational Therapist                   Outcome Measures       Row Name 12/26/23 5653          How much help from another person do you currently need...    Turning from your back to your side while in flat bed  without using bedrails? 1  -MW     Moving from lying on back to sitting on the side of a flat bed without bedrails? 1  -MW     Moving to and from a bed to a chair (including a wheelchair)? 1  -MW     Standing up from a chair using your arms (e.g., wheelchair, bedside chair)? 1  -MW     Climbing 3-5 steps with a railing? 1  -MW     To walk in hospital room? 1  -MW     AM-PAC 6 Clicks Score (PT) 6  -MW     Highest Level of Mobility Goal 2 --> Bed activities/dependent transfer  -               User Key  (r) = Recorded By, (t) = Taken By, (c) = Cosigned By      Initials Name Provider Type    Leona Watts, RN Registered Nurse                      OT Recommendation and Plan  Planned Therapy Interventions (OT): activity tolerance training, BADL retraining, passive ROM/stretching, occupation/activity based interventions, strengthening exercise, ROM/therapeutic exercise, patient/caregiver education/training  Therapy Frequency (OT): 3 times/wk (3-5x/week as able and available to promote functional progress)  Plan of Care Review  Plan of Care Reviewed With: patient  Progress: no change  Outcome Evaluation: Patient seen for OT evaluation. She was in semi-fowlers position and opened eyes X1. She was able to follow commands for strength and movement testing, but dependent for all other tasks with patient lying in bed with eyes closed. She did not speak verbally with therapist, but per other staff was observed and heard to speak with family earlier on this date. She would benefit from OT services by a licensed therapist to promote highest level of independence and safety to return to prior level of function and home with son.     Time Calculation:         Time Calculation- OT       Row Name 12/26/23 1522             Time Calculation- OT    OT Received On 12/26/23  -                User Key  (r) = Recorded By, (t) = Taken By, (c) = Cosigned By      Initials Name Provider Type    Caroline Bethea OT Occupational Therapist                   Therapy Charges for Today       Code Description Service Date Service Provider Modifiers Qty    [id number] HC OT EVAL MOD COMPLEXITY 4 2023 Caroline Mulligan OT GO 1                 Caroline Mulligan OT  2023    Electronically signed by Caroline Mulligan OT at 23 6712          Speech Language Pathology Notes (most recent note)        Negrita Mena MS CCC-SLP at 23 1339          Acute Care - Speech Language Pathology   Swallow Initial Evaluation [facility]  CLINICAL DYSPHAGIA EVALUATION     Patient Name: Cristina Hughes  : 1940  MRN: [id number]  Today's Date: 2023             Admit Date: 2023    Cristina Hughes  was seen at bedside this pm on 3S unit of [facility] to assess safety/efficacy of swallowing fnx, determine safest/least restrictive diet tolerance.     She has a medical hx significant for pulmonary embolism, blood clotting disorder, combined systolic and diastolic heart failure, dilated cardiomyopathy, DVT, hypertension, atrial fibrillation, and pulmonary hypertension.     Ms Hughes was unfamiliar to SLP department of [facility] prior to this admission, however has been unable to participate in dysphagia evaluation until this date per surgery consultation and a/a status. Per last SLP note on , she was recommended to continue NPO per her a/a status. Diet was initiated on  of puree solids and thin liquids.     Social History     Socioeconomic History    Marital status:    Tobacco Use    Smoking status: Never    Smokeless tobacco: Never   Vaping Use    Vaping Use: Every day   Substance and Sexual Activity    Drug use: Never      Imaging:   FL Barium Enema Single Contrast [[id number]] Ean as Reviewed   Order Status: Completed Collected: 23 1105    Updated: 23 1324   Narrative:     EXAMINATION: FL BARIUM ENEMA SINGLE-CONTRAST-     CLINICAL INDICATION: evaluate for rectal obstruciton; N39.0-Urinary  tract infection, site not specified; R41.82-Altered  mental status,  unspecified     COMPARISON: CT scan performed the same day.     PROCEDURE:  Somewhat limited exam due to patient's inability to be moved.     Tip was placed in the rectum and contrast was injected in the standard  retrograde fashion under fluoroscopy with total fluoroscopy time of 2.06  minutes.     No obstruction was identified to flow in the left colon all the way to  the distal transverse colon.     No intraluminal mass was seen.     No extrinsic deviation of the colon.      Impression:     No evidence of obstruction in the left colon on today's exam.  The right colon and proximal half of the transverse colon not imaged.     This report was finalized on 12/26/2023 1:22 PM by Dr. Abhinav Riley MD.       CT Abdomen Pelvis Without Contrast [[id number]] Ean as Reviewed   Order Status: Completed Collected: 12/26/23 1008    Updated: 12/26/23 1014   Narrative:     EXAM: CT ABDOMEN PELVIS WO CONTRAST-     TECHNIQUE: Multiple axial CT images were obtained from lung bases  through pubic symphysis WITHOUT administration of IV contrast.  Reformatted images in the coronal and/or sagittal plane(s) were  generated from the axial data set to facilitate diagnostic accuracy  and/or surgical planning.  Oral Contrast:NONE.     Radiation dose reduction techniques were utilized per ALARA protocol.  Automated exposure control was initiated through either or CareDose or  DoseRigEventup software packages by  protocol.    DOSE:     Clinical information AMS; N39.0-Urinary tract infection, site not  specified; R41.82-Altered mental status, unspecified     Comparison 12/22/2023     FINDINGS:     Lower thorax: Small bibasilar effusions and bibasilar consolidation.     Abdomen:     Liver: Homogeneous. No focal hepatic mass or ductal dilatation.     Gallbladder: Cholelithiasis     Pancreas: Unremarkable. No mass or ductal dilatation.     Spleen: Homogeneous. No splenomegaly.     Adrenals: No mass.     Kidneys/ureters: No  mass. No obstructive uropathy.  No evidence of  urolithiasis.     GI tract: Colonic distention.. Distal colon is stool and fluid-filled.     MESENTERY: Small volume ascites     Vasculature: Evidence of atherosclerotic vascular disease     Abdominal wall: No focal hernia or mass.     Bladder: No focal mass or significant wall thickening     Reproductive: Unremarkable as visualized     Bones: No acute bony abnormality.      Impression:        1. Mild colonic distention. Distal colon is fluid and stool-filled.  Consider direct visualization. Thickening of the right colon wall which  may represent a nonspecific colitis.     2. Small volume ascites.     3. Bibasilar effusions and minimal bibasilar airspace disease     4. Cholelithiasis     This report was finalized on 12/26/2023 10:12 AM by Dr. Abhinav Riley MD.       CT Chest Without Contrast Diagnostic [[id number]] Ean as Reviewed   Order Status: Completed Collected: 12/26/23 1013    Updated: 12/26/23 1016   Narrative:     EXAM: CT CHEST WO CONTRAST DIAGNOSTIC-     CLINICAL INDICATION:AMS; N39.0-Urinary tract infection, site not  specified; R41.82-Altered mental status, unspecified     COMPARISON: None immediately available.     TECHNIQUE: Multiple axial CT images were obtained from lung apex through  upper abdomen without the administration of IV contrast. Reformatted  images in the coronal and/or sagittal plane(s) were generated from the  axial data set to facilitate diagnostic accuracy and/or surgical  planning.     Radiation dose reduction techniques were utilized per ALARA protocol.  Automated exposure control was initiated through either or CareDose or  DoseRigPurpleTeal software packages by  protocol.    DOSE (DLP mGy-cm):     FINDINGS:     LUNGS: Patchy right-sided airspace disease and bibasilar consolidation     HEART: Unremarkable.     MEDIASTINUM: No masses. No enlarged lymph nodes.  No fluid collections.     PLEURA: Bibasilar pleural effusions      VASCULATURE: No evidence of aneurysm.     BONES: No acute bony abnormality.     VISUALIZED UPPER ABDOMEN: Please see the CT report for the abdomen and  pelvis     Other: None.      Impression:     Bibasilar effusions and bibasilar consolidation  Patchy airspace disease is present in the right lung      This report was finalized on 12/26/2023 10:14 AM by Dr. Abhinav Riley MD.          CT Abdomen Pelvis Without Contrast [[id number]] Ean as Reviewed   Order Status: Completed Collected: 12/22/23 1823    Updated: 12/22/23 1830   Narrative:     PROCEDURE: CT of the abdomen and pelvis performed on December 22, 2023.  The examination was performed with 4 mm axial imaging and sagittal and  coronal reconstruction images. The examination was performed according  to as low as reasonably achievable dose protocol. Total DLP = 2041.     HISTORY: Abdominal pain. Acute onset symptoms. Nonlocalized.     COMPARISON: None.     FINDINGS:     Mild enlarged heart size.  Small right and left pleural effusion, left greater than right with  associated compressive atelectasis in the lung bases.  Calcified granulomas in the spleen.  Cholelithiasis.  Small volume of free fluid in the abdomen and there is a small volume of  free fluid along the right and left colic gutter.  No features of cholecystitis or pancreatitis.  No acute process seen in the kidneys.  No abdominal aortic aneurysm or retroperitoneal hemorrhage.  Multilevel degenerative disc disease throughout the lumbar spine with  endplate and facet hypertrophic changes.  Mild osteoarthritis at the right and left hip joint.  Distended configuration to large bowel segments throughout the abdomen  and pelvis with multiple air-fluid levels suggestive of diffuse ileus  versus underlying partial distal large bowel obstruction.  No herniated bowel segment.  No acute process seen in the uterus.  No acute process seen in the bladder.      Impression:        1.  Multiple distended large bowel  segments with air-fluid levels  throughout the colon suggestive of diffuse ileus versus underlying  partial distal large bowel obstruction.  2.  The appendix is not identified.  3.  Cholelithiasis.  4.  Small volume of ascites in the abdomen extending to the upper right  and left colic gutter.  5.  Small bilateral pleural effusions, left greater than right with  compressive atelectasis at the lung bases.  6.  Right lower lobe calcified granuloma.  7.  Calcified granulomas in the spleen.  8.  Multilevel degenerative disc disease throughout the lumbar spine  with endplate and facet hypertrophic changes.  9.  No abdominal aortic aneurysm or retroperitoneal hemorrhage.  10.  No free air, abscess, or hematoma.     This report was finalized on 12/22/2023 6:28 PM by Jaden Stephens MD.       MRI Brain Without Contrast [[id number]] Ean as Reviewed   Order Status: Completed Collected: 12/22/23 1507    Updated: 12/22/23 1512   Narrative:     EXAM:    MR Head Without Intravenous Contrast     EXAM DATE:    12/22/2023 2:24 PM     CLINICAL HISTORY:    stroke protocol     TECHNIQUE:    Magnetic resonance images of the head/brain without intravenous  contrast in multiple planes.     COMPARISON:    No relevant prior studies available.     FINDINGS:    BRAIN AND EXTRA-AXIAL SPACES:  Abnormal T2 signal in the deep cerebral  white matter is consistent with small vessel ischemic/degenerative  changes.  The cerebral and cerebellar sulci are prominent consistent  with brain atrophy.  No hemorrhage.    BONES/JOINTS:  Unremarkable as visualized.    SINUSES:  Unremarkable as visualized.  No acute sinusitis.    MASTOID AIR CELLS:  Unremarkable as visualized.  No mastoid effusion.    ORBITS:  Unremarkable as visualized.      Impression:     1.  Small vessel ischemic/degenerative changes.  2.  Cerebral and cerebellar atrophy.     This report was finalized on 12/22/2023 3:10 PM by Dr. Harry Gross MD.       XR Chest 1 View [[id number]] Ean as  Reviewed   Order Status: Completed Collected: 12/22/23 1420    Updated: 12/22/23 1422   Narrative:     EXAM:    XR Chest, 1 View     EXAM DATE:    12/22/2023 1:37 PM     CLINICAL HISTORY:    Acute Stroke Protocol (onset < 12 hrs)     TECHNIQUE:    Frontal view of the chest.     COMPARISON:    No relevant prior studies available.     FINDINGS:    LUNGS AND PLEURAL SPACES:  Left lower lobe linear atelectasis or  minimal airspace opacity noted.  Coarsened interstitial markings noted  throughout the lungs.  No pneumothorax.    HEART:  Unremarkable as visualized.  No cardiomegaly.    MEDIASTINUM:  Unremarkable as visualized.    BONES/JOINTS:  Unremarkable as visualized.      Impression:       Left lower lobe linear atelectasis or minimal airspace opacity noted.     This report was finalized on 12/22/2023 2:20 PM by Dr. Harry Gross MD.     Labs:   Latest Reference Range & Units 12/22/23 12:39 12/23/23 02:11 12/24/23 07:01 12/25/23 00:57 12/26/23 02:30   WBC 3.40 - 10.80 10*3/mm3 11.09 (H) 9.70 8.52 8.74 8.47   RBC 3.77 - 5.28 10*6/mm3 3.49 (L) 3.42 (L) 3.39 (L) 3.15 (L) 2.94 (L)   Hemoglobin 12.0 - 15.9 g/dL 11.2 (L) 10.9 (L) 10.9 (L) 10.1 (L) 9.4 (L)   Hematocrit 34.0 - 46.6 % 34.0 34.4 32.7 (L) 31.2 (L) 29.9 (L)   Platelets 140 - 450 10*3/mm3 172 151 126 (L) 132 (L) 119 (L)   RDW 12.3 - 15.4 % 23.4 (H) 23.6 (H) 23.2 (H) 23.2 (H) 23.8 (H)   MCV 79.0 - 97.0 fL 97.4 (H) 100.6 (H) 96.5 99.0 (H) 101.7 (H)   MCH 26.6 - 33.0 pg 32.1 31.9 32.2 32.1 32.0   MCHC 31.5 - 35.7 g/dL 32.9 31.7 33.3 32.4 31.4 (L)   MPV 6.0 - 12.0 fL 9.4 9.2 9.8 10.2 10.7   RDW-SD 37.0 - 54.0 fl 82.0 (H) 86.2 (H) 80.6 (H) 82.9 (H) 87.3 (H)   (H): Data is abnormally high  (L): Data is abnormally low  Diet Orders (active) (From admission, onward)       Start     Ordered    12/25/23 1135  Diet: Regular/House Diet; Texture: Pureed (NDD 1); Fluid Consistency: Thin (IDDSI 0)  Diet Effective Now         12/25/23 1134                  Upon SLP entry, Ms  Saul was reclined and sleeping in bed w/ family member present and attentive at bedside. Family member reports that Ms Hughes has not been awake since he arrived. Ms Hughes is able to wake up to moderate speech and tactile cues. She is agreeable to participate in dysphagia assessment. She introduces family member present as her son. She is oriented to self at this time and asks where she is at. Her son is able to inform he and she later is able to recall this information.     She endorses no difficulty w/ swallowing of which she is aware and additionally denies any history of recurrent pneumonia. Her son additionally states that she does not typically have any pneumonia of which he is aware.     Ms Hughes was observed on 3L nasal cannula w/o complications across this evaluation.     She was positioned upright and centered in bed to accept multiple po presentations of ice chips, solid cracker, puree, and thin liquids via spoon, cup, and straw.  Patient reports being able to self provide po trials however does not demonstrate this across this assessment as she reports she is cold and declines to remove arms from under blankets.      Facial/oral structures were symmetrical upon observation. Lingual protrusion revealed no deviation. Oral mucosa were mildly xerostomic w/ dried tacky secretions present in the oral cavity. She is edentulous at this time, however both she and her son report that she normally does wear dentures to eat. Secretions were clear, thin, and well controlled. OROM/THIERRY was generally weak overall to imitate oral postures. Gag is not assessed. Volitional cough was intact w/ mildly weak intensity, clear in quality, non-productive. Voice was slightly weak in intensity, clear in quality w/ intelligible speech. Vocal intensity improves across conversational exchanges.    Upon po presentations, adequate bolus anticipation and acceptance w/ good labial seal for bolus clearance via spoon bowl, cup rim  stability and suction via straw. Bolus formation, manipulation and control were mildly prolonged w/ solid cracker presentation only. A-p transit appeared mildly delayed w/o significant oral residue appreciated. No overt s/s aspiration before the swallow.      Pharyngeal swallow was timely w/ adequate hyolaryngeal elevation per palpation. No overt s/s aspiration evidenced across this evaluation. No silent aspiration suspected. Patient denied odynophagia.    Visit Dx:     ICD-10-CM ICD-9-CM   1. Acute UTI  N39.0 599.0   2. Altered mental status, unspecified altered mental status type  R41.82 780.97     Patient Active Problem List   Diagnosis    Acute encephalopathy     Past Medical History:   Diagnosis Date    Atrial fibrillation     CHF (congestive heart failure)     Hypertension      History reviewed. No pertinent surgical history.    Impression:     Ms Hughes presented w/ a mild oral dysphagia w/ prolonged mastication of solid consistencies only and a mild delay in a-p transit. Pharyngeal swallow appears wfl across this assessment. Current oral dysphagia is felt to be primarily related to current edentulous status.     She is felt to most benefit from po diet advancement to mechanical soft solids, chopped meats, and thin liquids w/ medications administered whole in puree/thins or crushed PRN. Recommend assistance w/ feeding as needed. SLP discussed importance of upright and centered positioning for all po intake.     SLP Recommendation and Plan      1. Soft to chew textures, chopped meats, thin liquids.    2. Medicatoins whole in puree/thins. Crush PRN.  3. Upright and centered for all po intake  4. PHU precautions.  5. Oral care protocol.    No further formal SLP f/u warranted/recommended at this time.    D/w patient results and recommendations w/ verbal agreement.    D/w RN results and recommendations w/ verbal agreement.    Thank you for allowing me to participate in the care of your patient-  Negrita Mena  M.S., CCC-SLP          EDUCATION  The patient has been educated in the following areas:   Dysphagia (Swallowing Impairment) Oral Care/Hydration Modified Diet Instruction.        Time Calculation:    Time Calculation- SLP       Row Name 12/26/23 0809             Time Calculation- SLP    SLP Received On 12/23/23  -SHRUTHI                User Key  (r) = Recorded By, (t) = Taken By, (c) = Cosigned By      Initials Name Provider Type    Kimberly Tan MA,CCC-SLP Speech and Language Pathologist                    Therapy Charges for Today       Code Description Service Date Service Provider Modifiers Qty    [id number]  ST EVAL ORAL PHARYNG SWALLOW 4 12/26/2023 Negrita Mena MS CCC-SLP GN 1                 Negrita Mena MS CCC-SLP  12/26/2023    Electronically signed by Negrita Mena MS CCC-SLP at 12/26/23 1402       ADL Documentation (most recent)      Flowsheet Row Most Recent Value   Transferring 4 - completely dependent   Toileting 3 - assistive equipment and person   Bathing 2 - assistive person   Dressing 2 - assistive person   Eating 2 - assistive person   Communication 0 - understands/communicates without difficulty   Swallowing 2 - difficulty swallowing liquids/foods   Equipment Currently Used at Home wheelchair            Discharge Summary    No notes of this type exist for this encounter.       Discharge Order (From admission, onward)      None

## 2023-12-27 NOTE — PLAN OF CARE
Goal Outcome Evaluation:           Progress: improving  Outcome Evaluation: Patient more alert this shift. Pt able to take meds. VSS. Will contine plan of care.

## 2023-12-28 NOTE — PLAN OF CARE
Goal Outcome Evaluation:              Outcome Evaluation: Patient resting in bed at this time. VSS. Patients lactic 5.7, barry DUKE aware. No complaints. Will continue plan of care.

## 2023-12-28 NOTE — CASE MANAGEMENT/SOCIAL WORK
Discharge Planning Assessment  [facility]     Patient Name: Cristina Hughes  MRN: [id number]  Today's Date: 12/28/2023    Admit Date: 12/22/2023    Plan: SS followed up with [facility] per Satinder to inform that pt has been removed from isolation. Satinder states that pre-auth can be started. SS notified [facility] Pereira who states pre-auth has been approved. SS notified [facility] per Satinder who states they will accpet pt tomorrow 12/29/23. SS to follow.     Discharge Plan       Row Name 12/28/23 1540       Plan    Plan SS followed up with [facility] per Satinder to inform that pt has been removed from isolation. Satinder states that pre-auth can be started. SS notified [facility] Pereira who states pre-auth has been approved. SS notified [facility] per Satinder who states they will accpet pt tomorrow 12/29/23. SS to follow.               KAPIL Grover

## 2023-12-28 NOTE — CONSULTS
Palliative Care Initial Consult     Attending Physician: Kirill Fernandez*  Referring Provider: Kirill Fernandez    assistance with clarification of goals of care and psychosocial support  Code Status:   Code Status and Medical Interventions:   Ordered at: 12/23/23 0019     Code Status (Patient has no pulse and is not breathing):    CPR (Attempt to Resuscitate)     Medical Interventions (Patient has pulse or is breathing):    Full Support      Advanced Directives: Advance Directive Status: Patient does not have advance directive   Healthcare surrogate: Argenis Black  Goals of Care: After discussion with both Cristina and her son Argenis regarding her goals of care, Argenis states that he has decided to make his mother a DNR/DNI at this time.    HPI:  Cristina Hughes is a 83 y.o. female admitted on 12/22/2023 due to altered mental status. Cristina has a medical history of pulmonary embolism, blood clotting disorder, combined systolic and diastolic heart failure, dilated cardiomyopathy, DVT, hypertension, atrial fibrillation, pulmonary hypertension. Cristina had recently been admitted to [facility] on 11/26/23 for generalized weakness and altered mental status and at that time was found to have VRE of her urine and was treated with oral antibiotics. Cristina was discharged to nursing facility for rehab.Cristina was brought by EMS on 12/22/23 again for altered mental status and lethargy, upon arrival to ED a code stroke was called, with imaging unremarkable. Today 12/28/23 Cristina was alert to self only, she was pleasantly confused, flat in appearance, stated she was having back pain, denied nausea, shortness of breath, or anxiety and was talking like she was at a flea market. Palliative was consulted to discuss GOC/ACP.        ROS: Negative except as above in HPI.     Past Medical History:   Diagnosis Date    Atrial fibrillation     CHF (congestive heart failure)     Hypertension      History reviewed. No pertinent surgical  history.  Social History     Socioeconomic History    Marital status:    Tobacco Use    Smoking status: Never    Smokeless tobacco: Never   Vaping Use    Vaping Use: Every day   Substance and Sexual Activity    Drug use: Never     History reviewed. No pertinent family history.    No Known Allergies    Current Facility-Administered Medications   Medication Dose Route Frequency Provider Last Rate Last Admin    apixaban (ELIQUIS) tablet 5 mg  5 mg Oral Q12H Yvonne Medrano DO   5 mg at 12/28/23 0832    aspirin EC tablet 81 mg  81 mg Oral Daily Anabell Medranoa, DO   81 mg at 12/28/23 0832    sennosides-docusate (PERICOLACE) 8.6-50 MG per tablet 2 tablet  2 tablet Oral BID Gustavo Vaughan MD   2 tablet at 12/28/23 0832    And    polyethylene glycol (MIRALAX) packet 17 g  17 g Oral Daily PRN Gustavo Vaughan MD        And    bisacodyl (DULCOLAX) EC tablet 5 mg  5 mg Oral Daily PRN Gustavo Vaughan MD        And    bisacodyl (DULCOLAX) suppository 10 mg  10 mg Rectal Daily PRN Gustavo Vaughan MD        [Held by provider] digoxin (LANOXIN) tablet 125 mcg  125 mcg Oral Once per day on Mon Wed Fri Georgette Briseno PA-C   125 mcg at 12/27/23 0916    doxycycline (VIBRAMYCIN) 100 mg in sodium chloride 0.9 % 100 mL IVPB-VTB  100 mg Intravenous Q12H Kirill Fernandez DO   100 mg at 12/28/23 0207    ipratropium (ATROVENT) nebulizer solution 0.5 mg  0.5 mg Nebulization 4x Daily - RT Georgette Briseno PA-C   0.5 mg at 12/28/23 0612    metoprolol tartrate (LOPRESSOR) tablet 50 mg  50 mg Oral Q12H Georgette Briseno PA-C   50 mg at 12/28/23 0832    miconazole (MICOTIN) 2 % powder 1 application   1 application  Topical Q12H Georgette Briseno PA-C   1 application  at 12/28/23 0832    midodrine (PROAMATINE) tablet 2.5 mg  2.5 mg Oral Q8H PRN Kirill Fernandez DO        nitroglycerin (NITROSTAT) SL tablet 0.4 mg  0.4 mg Sublingual Q5 Min PRN Gustavo Vaughan MD        ondansetron (ZOFRAN)  "injection 4 mg  4 mg Intravenous Q6H PRN Georgette Briseno PA-C        piperacillin-tazobactam (ZOSYN) IVPB 4.5 g in 100 mL NS VTB  4.5 g Intravenous Q8H Kirill Fernandez, DO   4.5 g at 12/28/23 0430    promethazine (PHENERGAN) tablet 6.25 mg  6.25 mg Oral BID Kirill Fernandez, DO   6.25 mg at 12/28/23 0832    sodium chloride 0.9 % flush 10 mL  10 mL Intravenous PRN Gustavo Vaughan MD        sodium chloride 0.9 % flush 10 mL  10 mL Intravenous Q12H Gustavo Vaughan MD   10 mL at 12/27/23 2036    sodium chloride 0.9 % flush 10 mL  10 mL Intravenous PRN Gustavo Vaughan MD        sodium chloride 0.9 % infusion 40 mL  40 mL Intravenous PRN Gustavo Vaughan MD              senna-docusate sodium **AND** polyethylene glycol **AND** bisacodyl **AND** bisacodyl    midodrine    nitroglycerin    ondansetron    sodium chloride    sodium chloride    sodium chloride    Current medication reviewed for route, type, dose and frequency and are current per MAR.    Palliative Performance Scale Score:     /76   Pulse 88   Temp 97.3 °F (36.3 °C) (Axillary)   Resp 18   Ht 157.5 cm (62.01\")   Wt 108 kg (237 lb 11.2 oz)   SpO2 99%   BMI 43.46 kg/m²     Intake/Output Summary (Last 24 hours) at 12/28/2023 1058  Last data filed at 12/28/2023 0500  Gross per 24 hour   Intake 120 ml   Output 0 ml   Net 120 ml       PE:  General Appearance:    Chronically ill appearing, alert to self/awake, cooperative, NAD   HEENT:    NC/AT, without obvious abnormality, EOMI, anicteric    Neck:   supple, trachea midline, no JVD   Lungs:     Unlabored respirations, no wheezing, rhonchi or rales noted    Heart:    RRR, normal S1 and S2, no M/R/G   Abdomen:     Soft, NT, ND, NABS    Extremities:   Moves all extremities weakness, BLE  2-3+ pitting edema   Pulses:   Pulses palpable and equal bilaterally   Skin:   Warm, dry   Neurologic:   Alert to self only, pleasantly confused   Psych:   Calm, flat, pleasantly " confused       Labs:   Results from last 7 days   Lab Units 12/28/23  0244   WBC 10*3/mm3 6.58   HEMOGLOBIN g/dL 8.9*   HEMATOCRIT % 27.2*   PLATELETS 10*3/mm3 126*     Results from last 7 days   Lab Units 12/28/23  0244   SODIUM mmol/L 140   POTASSIUM mmol/L 3.2*   CHLORIDE mmol/L 105   CO2 mmol/L 18.6*   BUN mg/dL 29*   CREATININE mg/dL 1.58*   GLUCOSE mg/dL 101*   CALCIUM mg/dL 8.6     Results from last 7 days   Lab Units 12/28/23  0244   SODIUM mmol/L 140   POTASSIUM mmol/L 3.2*   CHLORIDE mmol/L 105   CO2 mmol/L 18.6*   BUN mg/dL 29*   CREATININE mg/dL 1.58*   CALCIUM mg/dL 8.6   BILIRUBIN mg/dL 1.7*   ALK PHOS U/L 213*   ALT (SGPT) U/L 32   AST (SGOT) U/L 42*   GLUCOSE mg/dL 101*     Imaging Results (Last 72 Hours)       Procedure Component Value Units Date/Time    FL Barium Enema Single Contrast [[id number]] Collected: 12/26/23 1105     Updated: 12/26/23 1324    Narrative:      EXAMINATION: FL BARIUM ENEMA SINGLE-CONTRAST-      CLINICAL INDICATION: evaluate for rectal obstruciton; N39.0-Urinary  tract infection, site not specified; R41.82-Altered mental status,  unspecified        COMPARISON: CT scan performed the same day.     PROCEDURE:  Somewhat limited exam due to patient's inability to be moved.     Tip was placed in the rectum and contrast was injected in the standard  retrograde fashion under fluoroscopy with total fluoroscopy time of 2.06  minutes.     No obstruction was identified to flow in the left colon all the way to  the distal transverse colon.     No intraluminal mass was seen.     No extrinsic deviation of the colon.       Impression:      No evidence of obstruction in the left colon on today's exam.  The right colon and proximal half of the transverse colon not imaged.        This report was finalized on 12/26/2023 1:22 PM by Dr. Abhinav Riley MD.       CT Chest Without Contrast Diagnostic [[id number]] Collected: 12/26/23 1013     Updated: 12/26/23 1016    Narrative:      EXAM: CT CHEST WO  CONTRAST DIAGNOSTIC-      CLINICAL INDICATION:AMS; N39.0-Urinary tract infection, site not  specified; R41.82-Altered mental status, unspecified      COMPARISON: None immediately available.     TECHNIQUE: Multiple axial CT images were obtained from lung apex through  upper abdomen without the administration of IV contrast. Reformatted  images in the coronal and/or sagittal plane(s) were generated from the  axial data set to facilitate diagnostic accuracy and/or surgical  planning.     Radiation dose reduction techniques were utilized per ALARA protocol.  Automated exposure control was initiated through either or [employer] or  [employer] software packages by  protocol.    DOSE (DLP mGy-cm):        FINDINGS:     LUNGS: Patchy right-sided airspace disease and bibasilar consolidation     HEART: Unremarkable.     MEDIASTINUM: No masses. No enlarged lymph nodes.  No fluid collections.     PLEURA: Bibasilar pleural effusions     VASCULATURE: No evidence of aneurysm.     BONES: No acute bony abnormality.     VISUALIZED UPPER ABDOMEN: Please see the CT report for the abdomen and  pelvis     Other: None.       Impression:      Bibasilar effusions and bibasilar consolidation  Patchy airspace disease is present in the right lung                 This report was finalized on 12/26/2023 10:14 AM by Dr. Abhinav Riley MD.       CT Abdomen Pelvis Without Contrast [[id number]] Collected: 12/26/23 1008     Updated: 12/26/23 1014    Narrative:      EXAM: CT ABDOMEN PELVIS WO CONTRAST-         TECHNIQUE: Multiple axial CT images were obtained from lung bases  through pubic symphysis WITHOUT administration of IV contrast.  Reformatted images in the coronal and/or sagittal plane(s) were  generated from the axial data set to facilitate diagnostic accuracy  and/or surgical planning.  Oral Contrast:NONE.     Radiation dose reduction techniques were utilized per ALARA protocol.  Automated exposure control was initiated through either  or [facility] or  [employer] software packages by  protocol.    DOSE:     Clinical information AMS; N39.0-Urinary tract infection, site not  specified; R41.82-Altered mental status, unspecified      Comparison 12/22/2023     FINDINGS:     Lower thorax: Small bibasilar effusions and bibasilar consolidation.     Abdomen:     Liver: Homogeneous. No focal hepatic mass or ductal dilatation.     Gallbladder: Cholelithiasis     Pancreas: Unremarkable. No mass or ductal dilatation.     Spleen: Homogeneous. No splenomegaly.     Adrenals: No mass.     Kidneys/ureters: No mass. No obstructive uropathy.  No evidence of  urolithiasis.     GI tract: Colonic distention.. Distal colon is stool and fluid-filled.     MESENTERY: Small volume ascites     Vasculature: Evidence of atherosclerotic vascular disease     Abdominal wall: No focal hernia or mass.        Bladder: No focal mass or significant wall thickening     Reproductive: Unremarkable as visualized     Bones: No acute bony abnormality.       Impression:         1. Mild colonic distention. Distal colon is fluid and stool-filled.  Consider direct visualization. Thickening of the right colon wall which  may represent a nonspecific colitis.     2. Small volume ascites.     3. Bibasilar effusions and minimal bibasilar airspace disease     4. Cholelithiasis                          This report was finalized on 12/26/2023 10:12 AM by Dr. Abhinav Riley MD.               Diagnostics: Reviewed    A: Cristina Hughes is a 83 y.o. female admitted on 12/22/2023 due to altered mental status. Cristina has a medical history of pulmonary embolism, blood clotting disorder, combined systolic and diastolic heart failure, dilated cardiomyopathy, DVT, hypertension, atrial fibrillation, pulmonary hypertension. Cristina had recently been admitted to [facility] on 11/26/23 for generalized weakness and altered mental status and at that time was found to have VRE of her urine and was treated with oral  antibiotics. Cristina was discharged to nursing facility for rehab.Cristina was brought by EMS on 12/22/23 again for altered mental status and lethargy, upon arrival to ED a code stroke was called, with imaging unremarkable. Today 12/28/23 Cristina was alert to self only, she was pleasantly confused, flat in appearance, stated she was having back pain, denied nausea, shortness of breath, or anxiety and was talking like she was at a flea market. Palliative was consulted to discuss GOC/ACP.           P:   Palliative care was consulted to discuss GOC/ACP.  After discussion with both Cristina and her son Argenis regarding her goals of care, Argenis states that he has decided to make his mother a DNR/DNI at this time. I let Dr Fernandez and Teresa RN know about code status change. Plan is for pt to return to [facility] and rehab for short term placement, no bed hold PER SS note.      We appreciate the consult and the opportunity to participate in Cristina Hughes's care. We will continue to follow along. Please do not hesitate to contact us regarding further symptom management or goals of care needs, including after hours or on weekends via our on call provider at [phone].     Time: 75 minutes spent reviewing medical and medication records, assessing and examining patient, discussing with family, answering questions, providing some guidance about a plan and documentation of care, and coordinating care with other healthcare members, with > 50% time spent face to face.     Marie Hernandez, APRN    12/28/2023

## 2023-12-28 NOTE — THERAPY TREATMENT NOTE
Acute Care - Occupational Therapy Treatment Note  MARK Parrish     Patient Name: Cristina Hughes  : 1940  MRN: [id number]  Today's Date: 2023             Admit Date: 2023       ICD-10-CM ICD-9-CM   1. Acute UTI  N39.0 599.0   2. Altered mental status, unspecified altered mental status type  R41.82 780.97     Patient Active Problem List   Diagnosis    Acute encephalopathy     Past Medical History:   Diagnosis Date    Atrial fibrillation     CHF (congestive heart failure)     Hypertension      History reviewed. No pertinent surgical history.      OT ASSESSMENT FLOWSHEET (last 12 hours)       OT Evaluation and Treatment       Row Name 23 1144                   OT Time and Intention    Subjective Information complains of;weakness;fatigue  -LM        Document Type therapy note (daily note)  -LM        Mode of Treatment occupational therapy  -LM        Patient Effort adequate  -LM        Comment Patient seen this date for adl retraining/education.  niece and sister present.  Patient currently requires total assist with all badl tasks and unable to safely transfer.  will require 24/7 assist for BADL tasks.  -LM           General Information    Existing Precautions/Restrictions fall  -LM           Cognition    Affect/Mental Status (Cognition) WFL  -LM        Orientation Status (Cognition) oriented to;person;place;verbal cues/prompts needed for orientation  -LM           Wound 23 0750 Right distal arm Skin Tear    Wound - Properties Group Placement Date: 23  -KE Placement Time: 0750  -KE Side: Right  -KE Orientation: distal  -KE Location: arm  -KE Primary Wound Type: Skin tear  -KE    Retired Wound - Properties Group Placement Date: 23  -KE Placement Time: 0750  -KE Side: Right  -KE Orientation: distal  -KE Location: arm  -KE Primary Wound Type: Skin tear  -KE    Retired Wound - Properties Group Date first assessed: 23  -KE Time first assessed: 0750  -KE Side: Right  -KE Location:  arm  -KE Primary Wound Type: Skin tear  -MARSHA       Positioning and Restraints    Post Treatment Position bed  -LM        In Bed call light within reach;encouraged to call for assist;with family/caregiver;exit alarm on  -LM                  User Key  (r) = Recorded By, (t) = Taken By, (c) = Cosigned By      Initials Name Effective Dates    LM Jenny Kumar OT 06/16/21 -     Teresa Shoemaker RN 08/23/23 -                            OT Recommendation and Plan              Time Calculation:     Therapy Charges for Today       Code Description Service Date Service Provider Modifiers Qty    [id number]  OT SELF CARE/MGMT/TRAIN EA 15 MIN 12/28/2023 Jenny Kumar OT GO 1                 Jenny Kumar OT  12/28/2023

## 2023-12-28 NOTE — PLAN OF CARE
Goal Outcome Evaluation:  Plan of Care Reviewed With: patient           Outcome Evaluation: Patient is resting in bed, VSS. Family at bedside this shift. Patient remains pleasantly confused. No acute changes or complaints, will continue POC.

## 2023-12-28 NOTE — PROGRESS NOTES
[facility] HOSPITALIST PROGRESS NOTE     Patient Identification:  Name:  Cristina Hughes  Age:  83 y.o.  Sex:  female  :  1940  MRN:  [id number]  Visit Number:  [id number]  ROOM: [address] [place] [address]     Primary Care Provider:  Sunshine Prescott PA    Length of stay in inpatient status:  5    Subjective     Chief Compliant:    Chief Complaint   Patient presents with    Altered Mental Status       History of Presenting Illness:    Patient seen in follow-up for encephalopathy and presumptive urinary tract infection.  Patient has remained hemodynamically stable.  Patient is awake, alert to self and place this morning.  She has no acute complaints and says she feels much better today.  Today is the best she has looked all week.  She has remained afebrile, no diarrhea and denies any abdominal pain.  Overnight noted increased lactic acidosis.    Objective     Current Hospital Meds:apixaban, 5 mg, Oral, Q12H  aspirin, 81 mg, Oral, Daily  [Held by provider] digoxin, 125 mcg, Oral, Once per day on   doxycycline, 100 mg, Intravenous, Q12H  ipratropium, 0.5 mg, Nebulization, 4x Daily - RT  metoprolol tartrate, 50 mg, Oral, Q12H  miconazole, 1 application , Topical, Q12H  piperacillin-tazobactam, 4.5 g, Intravenous, Q8H  promethazine, 6.25 mg, Oral, BID  senna-docusate sodium, 2 tablet, Oral, BID  sodium chloride, 10 mL, Intravenous, Q12H           Current Antimicrobial Therapy:  Anti-Infectives (From admission, onward)      Ordered     Dose/Rate Route Frequency Start Stop    23 1307  piperacillin-tazobactam (ZOSYN) IVPB 4.5 g in 100 mL NS VTB        Ordering Provider: Kirill Fernandez DO    4.5 g  over 4 Hours Intravenous Every 8 Hours 23 19523 1303  doxycycline (VIBRAMYCIN) 100 mg in sodium chloride 0.9 % 100 mL IVPB-VTB        Ordering Provider: Kirill Fernandez DO    100 mg  over 60 Minutes Intravenous Every 12 Hours 23 1400 24 6599     12/26/23 1307  piperacillin-tazobactam (ZOSYN) IVPB 4.5 g in 100 mL NS VTB        Ordering Provider: Kirill Fernandez DO    4.5 g  over 30 Minutes Intravenous Once 12/26/23 1400 12/26/23 1534    12/22/23 1454  cefTRIAXone (ROCEPHIN) 1,000 mg in sodium chloride 0.9 % 100 mL IVPB-VTB        Ordering Provider: Milo Narayanan DO    1,000 mg  200 mL/hr over 30 Minutes Intravenous Once 12/22/23 1510 12/22/23 1652    12/22/23 1736  cephalexin (KEFLEX) 500 MG capsule        Ordering Provider: Milo Narayanan DO    500 mg Oral 2 Times Daily 12/22/23 0000            Current Diuretic Therapy:  Diuretics (From admission, onward)      None          ----------------------------------------------------------------------------------------------------------------------  Vital Signs:  Temp:  [97.3 °F (36.3 °C)-98.9 °F (37.2 °C)] 97.3 °F (36.3 °C)  Heart Rate:  [] 88  Resp:  [18-20] 18  BP: ()/(61-76) 118/76  SpO2:  [93 %-99 %] 99 %  on  Flow (L/min):  [3] 3;   Device (Oxygen Therapy): nasal cannula  Body mass index is 43.46 kg/m².    Wt Readings from Last 3 Encounters:   12/28/23 108 kg (237 lb 11.2 oz)   12/22/23 98.4 kg (217 lb)     Intake & Output (last 3 days)         12/25 0701 12/26 0700 12/26 0701 12/27 0700 12/27 0701 12/28 0700 12/28 0701 12/29 0700    P.O. 0 60 240     Total Intake(mL/kg) 0 (0) 60 (0.6) 240 (2.2)     Urine (mL/kg/hr)   0 (0)     Total Output   0     Net 0 +60 +240             Urine Unmeasured Occurrence 4 x 1 x 1 x     Stool Unmeasured Occurrence 4 x 3 x 1 x           Diet: Regular/House Diet; Feeding Assistance - Nursing; Texture: Soft to Chew (NDD 3); Soft to Chew: Chopped Meat; Fluid Consistency: Thin (IDDSI 0)  ----------------------------------------------------------------------------------------------------------------------  Physical exam:  Constitutional: Elderly, chronically ill-appearing female, resting comfortably in bed, or awake, alert and oriented to self and place    HENT:  Head:  Normocephalic and atraumatic.  Mouth:  Moist mucous membranes.    Eyes:  Conjunctivae and EOM are normal. No scleral icterus.   Cardiovascular:  Normal rate, regular rhythm and normal heart sounds with no murmur. No JVD.   Pulmonary/Chest:  No respiratory distress, no wheezes, no crackles, with normal breath sounds and good air movement. Unlabored. No accessory muscle use.  Abdominal:  Soft. No distension and no tenderness.  Bowel sounds present. No rebound or guarding.   Musculoskeletal:  No tenderness, and no deformity.  No red or swollen joints anywhere.    Neurological:  No cranial nerve deficit.   Nonfocal.   Skin:  Skin is warm and dry. No rash noted. No pallor.   Peripheral vascular:  No clubbing, no cyanosis, no edema. Pedal and tibial pulses 2 out of 4 bilaterally.     ----------------------------------------------------------------------------------------------------------------------  Results from last 7 days   Lab Units 12/28/23  0759 12/28/23  0244 12/27/23  0136 12/26/23  0842 12/26/23  0230 12/25/23  1552 12/22/23  1844 12/22/23  1626 12/22/23  1605 12/22/23  1239   CRP mg/dL  --   --   --   --   --  3.87*  --  3.26*  --   --    LACTATE mmol/L 5.0* 5.7*  --  3.3*  --  3.1*   < >  --    < >  --    WBC 10*3/mm3  --  6.58 8.65  --  8.47  --    < >  --   --  11.09*   HEMOGLOBIN g/dL  --  8.9* 10.2*  --  9.4*  --    < >  --   --  11.2*   HEMATOCRIT %  --  27.2* 32.2*  --  29.9*  --    < >  --   --  34.0   MCV fL  --  97.8* 101.9*  --  101.7*  --    < >  --   --  97.4*   MCHC g/dL  --  32.7 31.7  --  31.4*  --    < >  --   --  32.9   PLATELETS 10*3/mm3  --  126* 127*  --  119*  --    < >  --   --  172   INR   --  2.30*  --  2.09*  --  3.14*  --   --   --  2.81*    < > = values in this interval not displayed.     Results from last 7 days   Lab Units 12/25/23  1606   PH, ARTERIAL pH units 7.408   PO2 ART mm Hg 168.0*   PCO2, ARTERIAL mm Hg 35.8   HCO3 ART mmol/L 22.6     Results from last 7  "days   Lab Units 12/28/23  0244 12/27/23  0136 12/26/23  0230 12/25/23  0057 12/24/23  1511 12/24/23  0701 12/23/23  0211   SODIUM mmol/L 140 138 137 139  --    < > 137   POTASSIUM mmol/L 3.2* 3.9 3.9 3.6  --    < > 3.4*   MAGNESIUM mg/dL  --   --   --  2.3 2.4  --   --    CHLORIDE mmol/L 105 105 103 104  --    < > 99   CO2 mmol/L 18.6* 17.5* 20.4* 26.0  --    < > 21.4*   BUN mg/dL 29* 27* 28* 30*  --    < > 41*   CREATININE mg/dL 1.58* 1.33* 1.31* 1.22*  --    < > 1.29*   CALCIUM mg/dL 8.6 8.3* 8.2* 8.5*  --    < > 8.9   GLUCOSE mg/dL 101* 87 108* 97  --    < > 99   ALBUMIN g/dL 2.5*  --   --  2.7*  --   --  2.9*   BILIRUBIN mg/dL 1.7*  --   --  1.0  --   --  1.5*   ALK PHOS U/L 213*  --   --  245*  --   --  335*   AST (SGOT) U/L 42*  --   --  54*  --   --  74*   ALT (SGPT) U/L 32  --   --  38*  --   --  45*    < > = values in this interval not displayed.   Estimated Creatinine Clearance: 31.2 mL/min (A) (by C-G formula based on SCr of 1.58 mg/dL (H)).  No results found for: \"AMMONIA\"    Results from last 7 days   Lab Units 12/22/23  1626 12/22/23  1424   HSTROP T ng/L 27* 25*             No results found for: \"HGBA1C\", \"POCGLU\"  No results found for: \"TSH\", \"FREET4\"  No results found for: \"PREGTESTUR\", \"PREGSERUM\", \"HCG\", \"HCGQUANT\"  Pain Management Panel           No data to display              Brief Urine Lab Results  (Last result in the past 365 days)        Color   Clarity   Blood   Leuk Est   Nitrite   Protein   CREAT   Urine HCG        12/22/23 1317 Yellow   Cloudy   Moderate (2+)   Moderate (2+)   Negative   Negative                 Blood Culture   Date Value Ref Range Status   12/22/2023 No growth at 2 days  Preliminary   12/22/2023 No growth at 2 days  Preliminary     Urine Culture   Date Value Ref Range Status   12/22/2023 Yeast isolated (A)  Final     Comment:     No further workup.     No results found for: \"WOUNDCX\"  No results found for: \"STOOLCX\"  No results found for: \"RESPCX\"  No results found " "for: \"AFBCX\"  Results from last 7 days   Lab Units 12/28/23  0759 12/28/23  0244 12/26/23  0842 12/25/23  1555 12/25/23  1552 12/23/23  0156 12/22/23  2201 12/22/23  1844 12/22/23  1626   PROCALCITONIN ng/mL  --   --   --   --  0.15 0.14  --   --   --    LACTATE mmol/L 5.0* 5.7* 3.3*  --  3.1* 2.0 2.2* 2.4*  --    SED RATE mm/hr  --   --   --  6  --   --   --   --   --    CRP mg/dL  --   --   --   --  3.87*  --   --   --  3.26*       I have personally looked at the labs and they are summarized above.  ----------------------------------------------------------------------------------------------------------------------  Detailed radiology reports for the last 24 hours:  Imaging Results (Last 24 Hours)       ** No results found for the last 24 hours. **          Assessment & Plan      Patient is an 83-year-old female with history significant for PE, dilated cardiomyopathy, atrial fibrillation with pulmonary hypertension who presented to the ER with reports of AMS per family.    #Acute metabolic encephalopathy  #Sepsis d/t suspected acute urinary tract infection, POA  --Patient presented with reports of AMS per family, recently admitted to [facility] on 11/26 due to weakness and AMS-at that time was diagnosed with VRE and completed a course of oral Zyvox.  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  Intermittently hypotensive, hard to arouse again today but then when family arrived she stayed awake and talk to them as they visited  --As urine culture is negative, antibiotics broadened to Zosyn to include cover " potential colitis noted on CT  --Patient looks much better today, alert to self and place this morning.  She says she feels much better today and has no acute complaints.  Physical exam is benign.  Overnight noted a lactic acid of 5.7, likely due to DuoNebs, these were switched and repeat lactic was 5.  Will monitor but no issues with hemodynamics and labs are otherwise unremarkable  --If she remains stable, can go to the nursing facility in the next 24 to 48 hours    #Possible colitis  #Ileus versus partial large bowel obstruction  #Acute hepatitis  #Direct hyperbilirubinemia  --CT abdomen/pelvis noted multiple distended large bowel segments consistent of ileus versus partial distal large bowel obstruction, small volume of ascites  --MRCP without any features of choledocholithiasis, cholelithiasis noted  --Initial elevation in transaminases and direct hyperbilirubinemia, but these are currently improving  --Repeat CT abdomen pelvis noted mild colonic distention with fluid and stool-filled, thickening of the right colon wall may represent colitis  --Acute hepatitis panel negative  --No nausea or vomiting or GI symptoms  --Multiple bowel movements her last several days, no evidence of ileus or SBO, general surgery following, plan for outpatient scope  --Continue Zosyn for intra-abdominal coverage    #Dementia without behavioral disturbances   --I discussed with son at length on 12/26, noted decline over the past year after  had passed.  Noted prior to any acute illnesses while at home she would be very forgetful and hallucinate-he stated she would see people at home in her house    #Cardiomyopathy  #Essential hypertension  #Atrial fibrillation  --Rate controlled, documented cardiomyopathy both systolic/diastolic however echo only notes diastolic dysfunction, none previous on file  --Echo noted EF is 66 to 70%, mild pulmonary hypertension  --Continue Eliquis for stroke prophylaxis  --Continue Lopressor and dig  with holding parameters if able to take p.o.  --Follow-up with cardiology outpatient    #Moderate protein and albumin malnutrition  #Bilateral pleural effusions, left greater than right  #History of pulmonary embolism    CHECKLIST:  Abx: Zosyn  VTE: Eliquis  GI ppx: None  Diet: Consistent carb  Code: CPR, full  Dispo: Patient seems to be improving, more awake and interactive today.  If she remains hemodynamically stable and all cultures remain negative, possibly discharge back to the nursing facility in 24 to 48 hours.    Kirill Fernandez DO  [facility] ERist  12/28/23  10:38 EST

## 2023-12-28 NOTE — PAYOR COMM NOTE
"CONTACT:  CHERRY DELCID RN  UTILIZATION MANAGEMENT DEPT.   [facility]   [facility], [address]   PHONE:  [phone]   FAX: [phone]       CLINICAL UPDATES--REF#  [id number]          Franchesca Hananh (83 y.o. Female)       Date of Birth   1940    Social Security Number       Address   [address] [address]    Home Phone   [phone]    MRN   [id number]       Evangelical   Unknown    Marital Status                               Admission Date   12/22/23    Admission Type   Emergency    Admitting Provider   Kirill Fernandez DO    Attending Provider   Kirill Fernandez DO    Department, Room/Bed   [address] [address]/       Discharge Date       Discharge Disposition       Discharge Destination                                 Attending Provider: Kirill Fernandez DO    Allergies: No Known Allergies    Isolation: Spore   Infection: C.difficile (rule out) (12/26/23)   Code Status: CPR    Ht: 157.5 cm (62.01\")   Wt: 108 kg (237 lb 11.2 oz)    Admission Cmt: None   Principal Problem: Acute encephalopathy [G93.40]                   Active Insurance as of 12/22/2023       Primary Coverage       Payor Plan Insurance Group Employer/Plan Group    ANTHEM MEDICARE REPLACEMENT ANTHEM MEDICARE ADVANTAGE [id number]       Payor Plan Address Payor Plan Phone Number Payor Plan Fax Number Effective Dates    [address] [phone]  1/1/2022 - None Entered    [place], [place] [address]         Subscriber Name Subscriber Birth Date Member ID       FRANCHESCA HANNAH 1940 [id number]                     Emergency Contacts        (Rel.) Home Phone Work Phone Mobile Phone    Argenis hannah (Son) [phone] -- --              Orders (last 48 hrs)        Start     Ordered    12/28/23 0830  ipratropium (ATROVENT) nebulizer solution 0.5 mg  4 Times Daily - RT         12/28/23 0358    12/28/23 0600  CBC & Differential  Daily       " 12/27/23 1434    12/28/23 0600  Comprehensive Metabolic Panel  Morning Draw         12/27/23 1434    12/28/23 0600  Protime-INR  Morning Draw         12/27/23 1434    12/28/23 0600  Lactic Acid, Plasma  Morning Draw         12/27/23 1434    12/28/23 0600  Digoxin Level  Morning Draw         12/27/23 1519    12/28/23 0600  CBC Auto Differential  PROCEDURE ONCE         12/27/23 2201    12/28/23 0323  Scan Slide  Once         12/28/23 0322    12/27/23 1245  promethazine (PHENERGAN) tablet 6.25 mg  2 Times Daily         12/27/23 1147    12/27/23 1148  Transfer Patient  Once         12/27/23 1148    12/27/23 1148  Discontinue Cardiac Monitoring  Once         12/27/23 1148    12/27/23 0600  CBC Auto Differential  PROCEDURE ONCE         12/26/23 2201    12/27/23 0406  Scan Slide  Once         12/27/23 0405    12/26/23 2000  piperacillin-tazobactam (ZOSYN) IVPB 4.5 g in 100 mL NS VTB  Every 8 Hours         12/26/23 1307    12/26/23 1900  ipratropium-albuterol (DUO-NEB) nebulizer solution 3 mL  4 Times Daily - RT,   Status:  Discontinued         12/26/23 1530    12/26/23 1500  cefTRIAXone (ROCEPHIN) 1,000 mg in sodium chloride 0.9 % 100 mL IVPB-VTB  Every 24 Hours,   Status:  Discontinued         12/25/23 1620    12/26/23 1403  Diet: Regular/House Diet; Feeding Assistance - Nursing; Texture: Soft to Chew (NDD 3); Soft to Chew: Chopped Meat; Fluid Consistency: Thin (IDDSI 0)  Diet Effective Now         12/26/23 1402    12/26/23 1400  piperacillin-tazobactam (ZOSYN) IVPB 3.375 g in 100 mL NS VTB  Every 6 Hours,   Status:  Discontinued         12/26/23 1303    12/26/23 1400  doxycycline (VIBRAMYCIN) 100 mg in sodium chloride 0.9 % 100 mL IVPB-VTB  Every 12 Hours         12/26/23 1303    12/26/23 1400  piperacillin-tazobactam (ZOSYN) IVPB 4.5 g in 100 mL NS VTB  Once         12/26/23 1307    12/26/23 1313  midodrine (PROAMATINE) tablet 2.5 mg  Every 8 Hours PRN         12/26/23 1314    12/26/23 1303  Clostridioides difficile  Toxin - Stool, Per Rectum  Once         12/26/23 1303    12/26/23 1303  Clostridioides difficile Toxin, PCR - Stool, Per Rectum  PROCEDURE ONCE         12/26/23 1303    12/26/23 1245  sodium chloride 0.9 % bolus 1,000 mL  Once         12/26/23 1152    12/26/23 0944  CT Chest Without Contrast Diagnostic  1 Time Imaging         12/26/23 0943    12/26/23 0944  CT Abdomen Pelvis Without Contrast  1 Time Imaging         12/26/23 0943    12/26/23 0819  Lactic Acid, Plasma  Once         12/26/23 0819    12/26/23 0819  Protime-INR  Once         12/26/23 0819    12/26/23 0600  CBC & Differential  Daily,   Status:  Canceled       12/25/23 1835 12/26/23 0600  Basic Metabolic Panel  Daily,   Status:  Canceled       12/25/23 1835    12/26/23 0600  CBC Auto Differential  PROCEDURE ONCE         12/25/23 2202    12/25/23 0900  digoxin (LANOXIN) tablet 125 mcg  Once per day on Mon Wed Fri 12/23/23 0401    12/24/23 0800  Neuro Checks  Every Shift      Comments: On arrival and handoff, increase frequency as clinically indicated or for thrombolytic administration, otherwise Q2 hours. Documentation of arrival assessment and any neuro change required.    12/23/23 2044 12/23/23 0900  sennosides-docusate (PERICOLACE) 8.6-50 MG per tablet 2 tablet  2 Times Daily        See [facility] for full Linked Orders Report.    12/23/23 0148    12/23/23 0900  miconazole (MICOTIN) 2 % powder 1 application   Every 12 Hours Scheduled         12/23/23 0251    12/23/23 0900  apixaban (ELIQUIS) tablet 5 mg  Every 12 Hours Scheduled         12/23/23 0401    12/23/23 0900  aspirin EC tablet 81 mg  Daily         12/23/23 0401    12/23/23 0900  metoprolol tartrate (LOPRESSOR) tablet 50 mg  Every 12 Hours Scheduled         12/23/23 0401    12/23/23 0800  Oral Care  2 Times Daily       12/23/23 0148    12/23/23 0245  sodium chloride 0.9 % flush 10 mL  Every 12 Hours Scheduled         12/23/23 0148    12/23/23 0245  sodium chloride 0.9 % infusion   Continuous,   Status:  Discontinued         12/23/23 0148    12/23/23 0219  ondansetron (ZOFRAN) injection 4 mg  Every 6 Hours PRN         12/23/23 0219    12/23/23 0149  Daily Weights  Daily       12/23/23 0148    12/23/23 0148  sodium chloride 0.9 % flush 10 mL  As Needed         12/23/23 0148    12/23/23 0148  sodium chloride 0.9 % infusion 40 mL  As Needed         12/23/23 0148    12/23/23 0148  polyethylene glycol (MIRALAX) packet 17 g  Daily PRN        See [facility] for full Linked Orders Report.    12/23/23 0148 12/23/23 0148  bisacodyl (DULCOLAX) EC tablet 5 mg  Daily PRN        See [facility] for full Linked Orders Report.    12/23/23 0148 12/23/23 0148  bisacodyl (DULCOLAX) suppository 10 mg  Daily PRN        See [facility] for full Linked Orders Report.    12/23/23 0148 12/23/23 0148  nitroglycerin (NITROSTAT) SL tablet 0.4 mg  Every 5 Minutes PRN         12/23/23 0148    12/23/23 0100  Linezolid (ZYVOX) 600 mg 300 mL  Every 12 Hours,   Status:  Discontinued         12/23/23 0020    12/22/23 1226  sodium chloride 0.9 % flush 10 mL  As Needed         12/22/23 1226    12/22/23 0000  cephalexin (KEFLEX) 500 MG capsule  2 Times Daily         12/22/23 1736    Unscheduled  Oxygen Therapy- Nasal Cannula; Titrate 1-6 LPM Per SpO2; 90 - 95%  Continuous PRN       12/22/23 1226    Unscheduled  Up With Assistance  As Needed       12/23/23 0148    --  aspirin 81 MG EC tablet  Daily         12/23/23 0312    --  furosemide (LASIX) 40 MG tablet  Daily         12/23/23 0312    --  digoxin (LANOXIN) 125 MCG tablet  3 Times Weekly         12/23/23 0312    --  linaclotide (Linzess) 145 MCG capsule capsule  Every Morning Before Breakfast         12/23/23 0312    --  mirtazapine (REMERON) 15 MG tablet  Nightly         12/23/23 0312    --  apixaban (ELIQUIS) 5 MG tablet tablet  Every 12 Hours Scheduled         12/23/23 0312    --  cefdinir (OMNICEF) 300 MG capsule  2 Times Daily         12/23/23 0312    --  metoprolol  tartrate (LOPRESSOR) 50 MG tablet  Every 12 Hours         23    --  ipratropium-albuterol (DUO-NEB) 0.5-2.5 mg/3 ml nebulizer  Every 6 Hours PRN         23    --  SCANNED - TELEMETRY           23    --  SCANNED - TELEMETRY           23    --  SCANNED - TELEMETRY           23    --  SCANNED - TELEMETRY           23    --  SCANNED - TELEMETRY           23    --  SCANNED - TELEMETRY           23    --  SCANNED - TELEMETRY           23    --  SCANNED - TELEMETRY           23 0000    --  SCANNED - TELEMETRY           23 0000    --  SCANNED - TELEMETRY           23    --  SCANNED - TELEMETRY           23                     Physician Progress Notes (last 48 hours)        Kirill Fernandez DO at 23 1133              [facility] HOSPITALIST PROGRESS NOTE     Patient Identification:  Name:  Cristina Hughes  Age:  83 y.o.  Sex:  female  :  1940  MRN:  [id number]  Visit Number:  [id number]  ROOM: [address] [place] [address]     Primary Care Provider:  Sunshine Prescott PA    Length of stay in inpatient status:  4    Subjective     Chief Compliant:    Chief Complaint   Patient presents with    Altered Mental Status       History of Presenting Illness:    Patient seen in follow-up for encephalopathy and presumptive urinary tract infection.  Patient has remained hemodynamically stable.  She is more awake and interactive this morning, pleasant and answers questions appropriately.  She denied any pain or symptoms but stated she occasionally did get nauseous.  Denies any abdominal pain.  Afebrile.  No adverse events noted overnight.    Objective     Current Hospital Meds:apixaban, 5 mg, Oral, Q12H  aspirin, 81 mg, Oral, Daily  digoxin, 125 mcg, Oral, Once per day on   doxycycline, 100 mg, Intravenous, Q12H  ipratropium-albuterol, 3 mL, Nebulization, 4x Daily - RT  metoprolol  tartrate, 50 mg, Oral, Q12H  miconazole, 1 application , Topical, Q12H  piperacillin-tazobactam, 4.5 g, Intravenous, Q8H  promethazine, 6.25 mg, Oral, BID  senna-docusate sodium, 2 tablet, Oral, BID  sodium chloride, 10 mL, Intravenous, Q12H           Current Antimicrobial Therapy:  Anti-Infectives (From admission, onward)      Ordered     Dose/Rate Route Frequency Start Stop    12/26/23 1307  piperacillin-tazobactam (ZOSYN) IVPB 4.5 g in 100 mL NS VTB        Ordering Provider: Kirill Fernandez DO    4.5 g  over 4 Hours Intravenous Every 8 Hours 12/26/23 2000 01/02/24 1959 12/26/23 1303  doxycycline (VIBRAMYCIN) 100 mg in sodium chloride 0.9 % 100 mL IVPB-VTB        Ordering Provider: Kirill Fernandez DO    100 mg  over 60 Minutes Intravenous Every 12 Hours 12/26/23 1400 01/02/24 1359    12/26/23 1307  piperacillin-tazobactam (ZOSYN) IVPB 4.5 g in 100 mL NS VTB        Ordering Provider: Kirill Fernandez DO    4.5 g  over 30 Minutes Intravenous Once 12/26/23 1400 12/26/23 1534    12/22/23 1454  cefTRIAXone (ROCEPHIN) 1,000 mg in sodium chloride 0.9 % 100 mL IVPB-VTB        Ordering Provider: Milo Narayanan DO    1,000 mg  200 mL/hr over 30 Minutes Intravenous Once 12/22/23 1510 12/22/23 1652    12/22/23 1736  cephalexin (KEFLEX) 500 MG capsule        Ordering Provider: Milo Narayanan DO    500 mg Oral 2 Times Daily 12/22/23 0000            Current Diuretic Therapy:  Diuretics (From admission, onward)      None          ----------------------------------------------------------------------------------------------------------------------  Vital Signs:  Temp:  [96.4 °F (35.8 °C)-97.4 °F (36.3 °C)] 97.4 °F (36.3 °C)  Heart Rate:  [] 96  Resp:  [18-20] 18  BP: ()/(47-78) 116/66  SpO2:  [93 %-98 %] 98 %  on  Flow (L/min):  [3] 3;   Device (Oxygen Therapy): nasal cannula  Body mass index is 42.59 kg/m².    Wt Readings from Last 3 Encounters:   12/27/23 106 kg (232 lb 14.4 oz)    12/22/23 98.4 kg (217 lb)     Intake & Output (last 3 days)         12/24 0701  12/25 0700 12/25 0701 12/26 0700 12/26 0701 12/27 0700 12/27 0701 12/28 0700    P.O.  0 60 120    Total Intake(mL/kg)  0 (0) 60 (0.6) 120 (1.1)    Urine (mL/kg/hr)        Stool        Total Output        Net  0 +60 +120            Urine Unmeasured Occurrence 5 x 4 x 1 x     Stool Unmeasured Occurrence 4 x 4 x 3 x           Diet: Regular/House Diet; Feeding Assistance - Nursing; Texture: Soft to Chew (NDD 3); Soft to Chew: Chopped Meat; Fluid Consistency: Thin (IDDSI 0)  ----------------------------------------------------------------------------------------------------------------------  Physical exam:  Constitutional: Elderly, chronically ill-appearing female, resting comfortably in bed, or awake, alert and interactive today no acute distress.      HENT:  Head:  Normocephalic and atraumatic.  Mouth:  Moist mucous membranes.    Eyes:  Conjunctivae and EOM are normal. No scleral icterus.   Cardiovascular:  Normal rate, regular rhythm and normal heart sounds with no murmur. No JVD.   Pulmonary/Chest:  No respiratory distress, no wheezes, no crackles, with normal breath sounds and good air movement. Unlabored. No accessory muscle use.  Abdominal:  Soft. No distension and no tenderness.  Bowel sounds present. No rebound or guarding.   Musculoskeletal:  No tenderness, and no deformity.  No red or swollen joints anywhere.    Neurological:  No cranial nerve deficit.   Nonfocal.   Skin:  Skin is warm and dry. No rash noted. No pallor.   Peripheral vascular:  No clubbing, no cyanosis, no edema. Pedal and tibial pulses 2 out of 4 bilaterally.     ----------------------------------------------------------------------------------------------------------------------  Results from last 7 days   Lab Units 12/27/23  0136 12/26/23  0842 12/26/23  0230 12/25/23  1552 12/25/23  0057 12/23/23  0211 12/23/23  0156 12/22/23  1844 12/22/23  1626  12/22/23  1605 12/22/23  1239   CRP mg/dL  --   --   --  3.87*  --   --   --   --  3.26*  --   --    LACTATE mmol/L  --  3.3*  --  3.1*  --   --  2.0   < >  --    < >  --    WBC 10*3/mm3 8.65  --  8.47  --  8.74   < >  --   --   --   --  11.09*   HEMOGLOBIN g/dL 10.2*  --  9.4*  --  10.1*   < >  --   --   --   --  11.2*   HEMATOCRIT % 32.2*  --  29.9*  --  31.2*   < >  --   --   --   --  34.0   MCV fL 101.9*  --  101.7*  --  99.0*   < >  --   --   --   --  97.4*   MCHC g/dL 31.7  --  31.4*  --  32.4   < >  --   --   --   --  32.9   PLATELETS 10*3/mm3 127*  --  119*  --  132*   < >  --   --   --   --  172   INR   --  2.09*  --  3.14*  --   --   --   --   --   --  2.81*    < > = values in this interval not displayed.     Results from last 7 days   Lab Units 12/25/23  1606   PH, ARTERIAL pH units 7.408   PO2 ART mm Hg 168.0*   PCO2, ARTERIAL mm Hg 35.8   HCO3 ART mmol/L 22.6     Results from last 7 days   Lab Units 12/27/23  0136 12/26/23  0230 12/25/23  0057 12/24/23  1511 12/24/23  0701 12/23/23  0211 12/22/23  1626 12/22/23  1240 12/22/23  1239   SODIUM mmol/L 138 137 139  --    < > 137  --   --  135*   POTASSIUM mmol/L 3.9 3.9 3.6  --    < > 3.4*  --   --  4.0   MAGNESIUM mg/dL  --   --  2.3 2.4  --   --   --   --   --    CHLORIDE mmol/L 105 103 104  --    < > 99  --   --  99   CO2 mmol/L 17.5* 20.4* 26.0  --    < > 21.4*  --   --  24.9   BUN mg/dL 27* 28* 30*  --    < > 41*  --   --  40*   CREATININE mg/dL 1.33* 1.31* 1.22*  --    < > 1.29*  --    < > 1.36*   CALCIUM mg/dL 8.3* 8.2* 8.5*  --    < > 8.9  --   --  8.8   GLUCOSE mg/dL 87 108* 97  --    < > 99  --   --  109*   ALBUMIN g/dL  --   --  2.7*  --   --  2.9*  --   --  2.9*   BILIRUBIN mg/dL  --   --  1.0  --   --  1.5* 1.6*  --  1.7*   ALK PHOS U/L  --   --  245*  --   --  335*  --   --  357*   AST (SGOT) U/L  --   --  54*  --   --  74*  --   --  86*   ALT (SGPT) U/L  --   --  38*  --   --  45*  --   --  48*    < > = values in this interval not displayed.  "  Estimated Creatinine Clearance: 36.7 mL/min (A) (by C-G formula based on SCr of 1.33 mg/dL (H)).  No results found for: \"AMMONIA\"    Results from last 7 days   Lab Units 12/22/23  1626 12/22/23  1424   HSTROP T ng/L 27* 25*             No results found for: \"HGBA1C\", \"POCGLU\"  No results found for: \"TSH\", \"FREET4\"  No results found for: \"PREGTESTUR\", \"PREGSERUM\", \"HCG\", \"HCGQUANT\"  Pain Management Panel           No data to display              Brief Urine Lab Results  (Last result in the past 365 days)        Color   Clarity   Blood   Leuk Est   Nitrite   Protein   CREAT   Urine HCG        12/22/23 1317 Yellow   Cloudy   Moderate (2+)   Moderate (2+)   Negative   Negative                 Blood Culture   Date Value Ref Range Status   12/22/2023 No growth at 2 days  Preliminary   12/22/2023 No growth at 2 days  Preliminary     Urine Culture   Date Value Ref Range Status   12/22/2023 Yeast isolated (A)  Final     Comment:     No further workup.     No results found for: \"WOUNDCX\"  No results found for: \"STOOLCX\"  No results found for: \"RESPCX\"  No results found for: \"AFBCX\"  Results from last 7 days   Lab Units 12/26/23  0842 12/25/23  1555 12/25/23  1552 12/23/23  0156 12/22/23  2201 12/22/23  1844 12/22/23  1626 12/22/23  1605   PROCALCITONIN ng/mL  --   --  0.15 0.14  --   --   --   --    LACTATE mmol/L 3.3*  --  3.1* 2.0 2.2* 2.4*  --  2.9*   SED RATE mm/hr  --  6  --   --   --   --   --   --    CRP mg/dL  --   --  3.87*  --   --   --  3.26*  --        I have personally looked at the labs and they are summarized above.  ----------------------------------------------------------------------------------------------------------------------  Detailed radiology reports for the last 24 hours:  Imaging Results (Last 24 Hours)       Procedure Component Value Units Date/Time    FL Barium Enema Single Contrast [[id number]] Collected: 12/26/23 1105     Updated: 12/26/23 1324    Narrative:      EXAMINATION: FL BARIUM ENEMA " SINGLE-CONTRAST-      CLINICAL INDICATION: evaluate for rectal obstruciton; N39.0-Urinary  tract infection, site not specified; R41.82-Altered mental status,  unspecified        COMPARISON: CT scan performed the same day.     PROCEDURE:  Somewhat limited exam due to patient's inability to be moved.     Tip was placed in the rectum and contrast was injected in the standard  retrograde fashion under fluoroscopy with total fluoroscopy time of 2.06  minutes.     No obstruction was identified to flow in the left colon all the way to  the distal transverse colon.     No intraluminal mass was seen.     No extrinsic deviation of the colon.       Impression:      No evidence of obstruction in the left colon on today's exam.  The right colon and proximal half of the transverse colon not imaged.        This report was finalized on 12/26/2023 1:22 PM by Dr. Abhinav Riley MD.             Assessment & Plan      Patient is an 83-year-old female with history significant for PE, dilated cardiomyopathy, atrial fibrillation with pulmonary hypertension who presented to the ER with reports of AMS per family.    #Acute metabolic encephalopathy  #Sepsis d/t suspected acute urinary tract infection, POA  --Patient presented with reports of AMS per family, recently admitted to [facility] on 11/26 due to weakness and AMS-at that time was diagnosed with VRE and completed a course of oral Zyvox.  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  Intermittently hypotensive, hard to arouse again today but then when family arrived she stayed awake  and talk to them as they visited  --As urine culture is negative, antibiotics broadened to Zosyn to include cover potential colitis noted on CT  --Patient is more awake and alert this morning and able to participate in conversation, looks improved from prior days.  If she remains hemodynamically stable, potentially back to the nursing home in 24 to 48 hours.    #Possible colitis  #Ileus versus partial large bowel obstruction  #Acute hepatitis  #Direct hyperbilirubinemia  --CT abdomen/pelvis noted multiple distended large bowel segments consistent of ileus versus partial distal large bowel obstruction, small volume of ascites  --MRCP without any features of choledocholithiasis, cholelithiasis noted  --Initial elevation in transaminases and direct hyperbilirubinemia, but these are currently improving  --Repeat CT abdomen pelvis noted mild colonic distention with fluid and stool-filled, thickening of the right colon wall may represent colitis  --Acute hepatitis panel negative  --No nausea or vomiting or GI symptoms  --Multiple bowel movements her last several days, no evidence of ileus or SBO, general surgery following, plan for outpatient scope  --Continue Zosyn for intra-abdominal coverage    #Dementia without behavioral disturbances   --I discussed with son at length on 12/26, noted decline over the past year after  had passed.  Noted prior to any acute illnesses while at home she would be very forgetful and hallucinate-he stated she would see people at home in her house    #Cardiomyopathy  #Essential hypertension  #Atrial fibrillation  --Rate controlled, documented cardiomyopathy both systolic/diastolic however echo only notes diastolic dysfunction, none previous on file  --Echo noted EF is 66 to 70%, mild pulmonary hypertension  --Continue Eliquis for stroke prophylaxis  --Continue Lopressor and dig with holding parameters if able to take p.o.  --Follow-up with cardiology outpatient    #Moderate protein and  albumin malnutrition  #Bilateral pleural effusions, left greater than right  #History of pulmonary embolism    CHECKLIST:  Abx: Zosyn  VTE: Eliquis  GI ppx: None  Diet: Consistent carb  Code: CPR, full  Dispo: Patient seems to be improving, more awake and interactive today.  If she remains hemodynamically stable and all cultures remain negative, possibly discharge back to the nursing facility in 24 to 48 hours.    Kirill Fernandez DO  [facility]  23  11:48 EST      Electronically signed by Kirill Fernandez DO at 23 1148       Kirill Fernandez DO at 23 1504              [facility]     Patient Identification:  Name:  Cristina Hughes  Age:  83 y.o.  Sex:  female  :  1940  MRN:  [id number]  Visit Number:  [id number]  ROOM: [address] [place] [address]     Primary Care Provider:  Sunshine Prescott PA    Length of stay in inpatient status:  3    Subjective     Chief Compliant:    Chief Complaint   Patient presents with    Altered Mental Status       History of Presenting Illness:    Patient seen in follow-up for encephalopathy and presumptive urinary tract infection.  Hemodynamically stable earlier this morning, later this evening nursing reported hypotension although patient was lethargic, she was arousable and would curse occasionally and nursing with any questions that was asked-this happened both after lunch on the  and the .  When family arrived, she was talking and answering questions appropriately and stayed awake throughout the entire visit.  Afebrile and denied any complaints.    Objective     Current Hospital Meds:apixaban, 5 mg, Oral, Q12H  aspirin, 81 mg, Oral, Daily  digoxin, 125 mcg, Oral, Once per day on   doxycycline, 100 mg, Intravenous, Q12H  metoprolol tartrate, 50 mg, Oral, Q12H  miconazole, 1 application , Topical, Q12H  piperacillin-tazobactam, 4.5 g, Intravenous, Once  piperacillin-tazobactam, 4.5  g, Intravenous, Q8H  senna-docusate sodium, 2 tablet, Oral, BID  sodium chloride, 10 mL, Intravenous, Q12H    sodium chloride, 50 mL/hr, Last Rate: 50 mL/hr (12/25/23 1834)        Current Antimicrobial Therapy:  Anti-Infectives (From admission, onward)      Ordered     Dose/Rate Route Frequency Start Stop    12/26/23 1307  piperacillin-tazobactam (ZOSYN) IVPB 4.5 g in 100 mL NS VTB        Ordering Provider: Kirill Fernandez DO    4.5 g  over 4 Hours Intravenous Every 8 Hours 12/26/23 2000 01/02/24 1959 12/26/23 1303  doxycycline (VIBRAMYCIN) 100 mg in sodium chloride 0.9 % 100 mL IVPB-VTB        Ordering Provider: Kirill Fernandez DO    100 mg  over 60 Minutes Intravenous Every 12 Hours 12/26/23 1400 01/02/24 1359    12/26/23 1307  piperacillin-tazobactam (ZOSYN) IVPB 4.5 g in 100 mL NS VTB        Ordering Provider: Kirill Fernnadez DO    4.5 g  over 30 Minutes Intravenous Once 12/26/23 1400      12/22/23 1454  cefTRIAXone (ROCEPHIN) 1,000 mg in sodium chloride 0.9 % 100 mL IVPB-VTB        Ordering Provider: Milo Narayanan DO    1,000 mg  200 mL/hr over 30 Minutes Intravenous Once 12/22/23 1510 12/22/23 1652    12/22/23 1736  cephalexin (KEFLEX) 500 MG capsule        Ordering Provider: Milo Narayanan DO    500 mg Oral 2 Times Daily 12/22/23 0000            Current Diuretic Therapy:  Diuretics (From admission, onward)      None          ----------------------------------------------------------------------------------------------------------------------  Vital Signs:  Temp:  [96.3 °F (35.7 °C)-97.9 °F (36.6 °C)] 96.3 °F (35.7 °C)  Heart Rate:  [] 97  Resp:  [18] 18  BP: ()/(47-67) 83/47  SpO2:  [95 %-98 %] 98 %  on  Flow (L/min):  [3] 3;   Device (Oxygen Therapy): nasal cannula  Body mass index is 42.62 kg/m².    Wt Readings from Last 3 Encounters:   12/26/23 106 kg (233 lb 1.6 oz)   12/22/23 98.4 kg (217 lb)     Intake & Output (last 3 days)         12/23 0701  12/24  0700 12/24 0701 12/25 0700 12/25 0701 12/26 0700 12/26 0701 12/27 0700    P.O.   0 60    Total Intake(mL/kg)   0 (0) 60 (0.6)    Urine (mL/kg/hr) 0 (0)       Stool 0       Total Output 0       Net 0  0 +60            Urine Unmeasured Occurrence 4 x 5 x 4 x     Stool Unmeasured Occurrence 4 x 4 x 4 x           Diet: Regular/House Diet; Feeding Assistance - Nursing; Texture: Soft to Chew (NDD 3); Soft to Chew: Chopped Meat; Fluid Consistency: Thin (IDDSI 0)  ----------------------------------------------------------------------------------------------------------------------  Physical exam:  Constitutional: Elderly, chronically ill-appearing female, resting comfortably in bed, no acute distress.      HENT:  Head:  Normocephalic and atraumatic.  Mouth:  Moist mucous membranes.    Eyes:  Conjunctivae and EOM are normal. No scleral icterus.   Cardiovascular:  Normal rate, regular rhythm and normal heart sounds with no murmur. No JVD.   Pulmonary/Chest:  No respiratory distress, no wheezes, no crackles, with normal breath sounds and good air movement. Unlabored. No accessory muscle use.  Abdominal:  Soft. No distension and no tenderness.  Bowel sounds present. No rebound or guarding.   Musculoskeletal:  No tenderness, and no deformity.  No red or swollen joints anywhere.    Neurological:  No cranial nerve deficit.   Nonfocal.   Skin:  Skin is warm and dry. No rash noted. No pallor.   Peripheral vascular:  No clubbing, no cyanosis, no edema. Pedal and tibial pulses 2 out of 4 bilaterally.     Unchanged from 12/25/2023  Electronically signed by Kirill Fernandez DO, 12/26/23, 3:12 PM EST.      ----------------------------------------------------------------------------------------------------------------------  Results from last 7 days   Lab Units 12/26/23  0842 12/26/23  0230 12/25/23  1552 12/25/23  0057 12/24/23  0701 12/23/23  0211 12/23/23  0156 12/22/23  1844 12/22/23  1626 12/22/23  1605 12/22/23  1239   CRP  mg/dL  --   --  3.87*  --   --   --   --   --  3.26*  --   --    LACTATE mmol/L 3.3*  --  3.1*  --   --   --  2.0   < >  --    < >  --    WBC 10*3/mm3  --  8.47  --  8.74 8.52   < >  --   --   --   --  11.09*   HEMOGLOBIN g/dL  --  9.4*  --  10.1* 10.9*   < >  --   --   --   --  11.2*   HEMATOCRIT %  --  29.9*  --  31.2* 32.7*   < >  --   --   --   --  34.0   MCV fL  --  101.7*  --  99.0* 96.5   < >  --   --   --   --  97.4*   MCHC g/dL  --  31.4*  --  32.4 33.3   < >  --   --   --   --  32.9   PLATELETS 10*3/mm3  --  119*  --  132* 126*   < >  --   --   --   --  172   INR  2.09*  --  3.14*  --   --   --   --   --   --   --  2.81*    < > = values in this interval not displayed.     Results from last 7 days   Lab Units 12/25/23  1606   PH, ARTERIAL pH units 7.408   PO2 ART mm Hg 168.0*   PCO2, ARTERIAL mm Hg 35.8   HCO3 ART mmol/L 22.6     Results from last 7 days   Lab Units 12/26/23  0230 12/25/23  0057 12/24/23  1511 12/24/23  0701 12/23/23  0211 12/22/23  1626 12/22/23  1240 12/22/23  1239   SODIUM mmol/L 137 139  --  136 137  --   --  135*   POTASSIUM mmol/L 3.9 3.6  --  3.1* 3.4*  --   --  4.0   MAGNESIUM mg/dL  --  2.3 2.4  --   --   --   --   --    CHLORIDE mmol/L 103 104  --  102 99  --   --  99   CO2 mmol/L 20.4* 26.0  --  22.5 21.4*  --   --  24.9   BUN mg/dL 28* 30*  --  29* 41*  --   --  40*   CREATININE mg/dL 1.31* 1.22*  --  1.08* 1.29*  --    < > 1.36*   CALCIUM mg/dL 8.2* 8.5*  --  8.3* 8.9  --   --  8.8   GLUCOSE mg/dL 108* 97  --  83 99  --   --  109*   ALBUMIN g/dL  --  2.7*  --   --  2.9*  --   --  2.9*   BILIRUBIN mg/dL  --  1.0  --   --  1.5* 1.6*  --  1.7*   ALK PHOS U/L  --  245*  --   --  335*  --   --  357*   AST (SGOT) U/L  --  54*  --   --  74*  --   --  86*   ALT (SGPT) U/L  --  38*  --   --  45*  --   --  48*    < > = values in this interval not displayed.   Estimated Creatinine Clearance: 37.2 mL/min (A) (by C-G formula based on SCr of 1.31 mg/dL (H)).  No results found for:  "\"AMMONIA\"    Results from last 7 days   Lab Units 12/22/23  1626 12/22/23  1424   HSTROP T ng/L 27* 25*             No results found for: \"HGBA1C\", \"POCGLU\"  No results found for: \"TSH\", \"FREET4\"  No results found for: \"PREGTESTUR\", \"PREGSERUM\", \"HCG\", \"HCGQUANT\"  Pain Management Panel           No data to display              Brief Urine Lab Results  (Last result in the past 365 days)        Color   Clarity   Blood   Leuk Est   Nitrite   Protein   CREAT   Urine HCG        12/22/23 1317 Yellow   Cloudy   Moderate (2+)   Moderate (2+)   Negative   Negative                 Blood Culture   Date Value Ref Range Status   12/22/2023 No growth at 2 days  Preliminary   12/22/2023 No growth at 2 days  Preliminary     Urine Culture   Date Value Ref Range Status   12/22/2023 Yeast isolated (A)  Final     Comment:     No further workup.     No results found for: \"WOUNDCX\"  No results found for: \"STOOLCX\"  No results found for: \"RESPCX\"  No results found for: \"AFBCX\"  Results from last 7 days   Lab Units 12/26/23  0842 12/25/23  1555 12/25/23  1552 12/23/23  0156 12/22/23  2201 12/22/23  1844 12/22/23  1626 12/22/23  1605   PROCALCITONIN ng/mL  --   --  0.15 0.14  --   --   --   --    LACTATE mmol/L 3.3*  --  3.1* 2.0 2.2* 2.4*  --  2.9*   SED RATE mm/hr  --  6  --   --   --   --   --   --    CRP mg/dL  --   --  3.87*  --   --   --  3.26*  --        I have personally looked at the labs and they are summarized above.  ----------------------------------------------------------------------------------------------------------------------  Detailed radiology reports for the last 24 hours:  Imaging Results (Last 24 Hours)       Procedure Component Value Units Date/Time    FL Barium Enema Single Contrast [[id number]] Collected: 12/26/23 1105     Updated: 12/26/23 1324    Narrative:      EXAMINATION: FL BARIUM ENEMA SINGLE-CONTRAST-      CLINICAL INDICATION: evaluate for rectal obstruciton; N39.0-Urinary  tract infection, site not " specified; R41.82-Altered mental status,  unspecified        COMPARISON: CT scan performed the same day.     PROCEDURE:  Somewhat limited exam due to patient's inability to be moved.     Tip was placed in the rectum and contrast was injected in the standard  retrograde fashion under fluoroscopy with total fluoroscopy time of 2.06  minutes.     No obstruction was identified to flow in the left colon all the way to  the distal transverse colon.     No intraluminal mass was seen.     No extrinsic deviation of the colon.       Impression:      No evidence of obstruction in the left colon on today's exam.  The right colon and proximal half of the transverse colon not imaged.        This report was finalized on 12/26/2023 1:22 PM by Dr. Abhinav Riley MD.       CT Chest Without Contrast Diagnostic [[id number]] Collected: 12/26/23 1013     Updated: 12/26/23 1016    Narrative:      EXAM: CT CHEST WO CONTRAST DIAGNOSTIC-      CLINICAL INDICATION:AMS; N39.0-Urinary tract infection, site not  specified; R41.82-Altered mental status, unspecified      COMPARISON: None immediately available.     TECHNIQUE: Multiple axial CT images were obtained from lung apex through  upper abdomen without the administration of IV contrast. Reformatted  images in the coronal and/or sagittal plane(s) were generated from the  axial data set to facilitate diagnostic accuracy and/or surgical  planning.     Radiation dose reduction techniques were utilized per ALARA protocol.  Automated exposure control was initiated through either or [employer] or  [employer] software packages by  protocol.    DOSE (DLP mGy-cm):        FINDINGS:     LUNGS: Patchy right-sided airspace disease and bibasilar consolidation     HEART: Unremarkable.     MEDIASTINUM: No masses. No enlarged lymph nodes.  No fluid collections.     PLEURA: Bibasilar pleural effusions     VASCULATURE: No evidence of aneurysm.     BONES: No acute bony abnormality.     VISUALIZED UPPER  ABDOMEN: Please see the CT report for the abdomen and  pelvis     Other: None.       Impression:      Bibasilar effusions and bibasilar consolidation  Patchy airspace disease is present in the right lung                 This report was finalized on 12/26/2023 10:14 AM by Dr. Abhinav Riley MD.       CT Abdomen Pelvis Without Contrast [[id number]] Collected: 12/26/23 1008     Updated: 12/26/23 1014    Narrative:      EXAM: CT ABDOMEN PELVIS WO CONTRAST-         TECHNIQUE: Multiple axial CT images were obtained from lung bases  through pubic symphysis WITHOUT administration of IV contrast.  Reformatted images in the coronal and/or sagittal plane(s) were  generated from the axial data set to facilitate diagnostic accuracy  and/or surgical planning.  Oral Contrast:NONE.     Radiation dose reduction techniques were utilized per ALARA protocol.  Automated exposure control was initiated through either or [employer] or  [employer] software packages by  protocol.    DOSE:     Clinical information AMS; N39.0-Urinary tract infection, site not  specified; R41.82-Altered mental status, unspecified      Comparison 12/22/2023     FINDINGS:     Lower thorax: Small bibasilar effusions and bibasilar consolidation.     Abdomen:     Liver: Homogeneous. No focal hepatic mass or ductal dilatation.     Gallbladder: Cholelithiasis     Pancreas: Unremarkable. No mass or ductal dilatation.     Spleen: Homogeneous. No splenomegaly.     Adrenals: No mass.     Kidneys/ureters: No mass. No obstructive uropathy.  No evidence of  urolithiasis.     GI tract: Colonic distention.. Distal colon is stool and fluid-filled.     MESENTERY: Small volume ascites     Vasculature: Evidence of atherosclerotic vascular disease     Abdominal wall: No focal hernia or mass.        Bladder: No focal mass or significant wall thickening     Reproductive: Unremarkable as visualized     Bones: No acute bony abnormality.       Impression:         1. Mild colonic  distention. Distal colon is fluid and stool-filled.  Consider direct visualization. Thickening of the right colon wall which  may represent a nonspecific colitis.     2. Small volume ascites.     3. Bibasilar effusions and minimal bibasilar airspace disease     4. Cholelithiasis                          This report was finalized on 12/26/2023 10:12 AM by Dr. Abhinav Riley MD.             Assessment & Plan      Patient is an 83-year-old female with history significant for PE, dilated cardiomyopathy, atrial fibrillation with pulmonary hypertension who presented to the ER with reports of AMS per family.    #Acute metabolic encephalopathy  #Sepsis d/t suspected acute urinary tract infection, POA  --Patient presented with reports of AMS per family, recently admitted to [facility] on 11/26 due to weakness and AMS-at that time was diagnosed with VRE and completed a course of oral Zyvox.  At that time also found to have a distended colon felt to be consistent with [place]  --ABG unremarkable on 3 L  --CT head negative  --CT angios without evidence of LVO  --MRI brain cerebellar and cerebral atrophy  --Labs repeated, ABG unremarkable  --Urine culture yeast, contaminant-possibly skewed given recent antibiotic use  --Bladder scan negative  --patient is intermittently hard to arouse however then will respond sometimes curse staff.  Intermittently hypotensive, hard to arouse again today but then when family arrived she stayed awake and talk to them as they visited  -- As urine culture is negative, antibiotics broadened to Zosyn to include cover potential colitis noted on CT  --Neurology consulted, signed off    #Possible colitis  #Ileus versus partial large bowel obstruction  #Acute hepatitis  #Direct hyperbilirubinemia  --CT abdomen/pelvis noted multiple distended large bowel segments consistent of ileus versus partial distal large bowel obstruction, small volume of ascites  --MRCP without any features of  choledocholithiasis, cholelithiasis noted  --Initial elevation in transaminases and direct hyperbilirubinemia, but these are currently improving  --Repeat CT abdomen pelvis noted mild colonic distention with fluid and stool-filled, thickening of the right colon wall may represent colitis  --Acute hepatitis panel negative  --No nausea or vomiting or GI symptoms  --Multiple bowel movements her last several days, no evidence of ileus or SBO, general surgery following, plan for outpatient scope  --Given intermittent hypotension without otherwise clear source, will empirically change antibiotics to Zosyn for intra-abdominal coverage    #Dementia without behavioral disturbances   --I discussed with son at length on 12/26, noted decline over the past year after  had passed.  Noted prior to any acute illnesses while at home she would be very forgetful and hallucinate-he stated she would see people at home in her house    #Cardiomyopathy  #Essential hypertension  #Atrial fibrillation  --Rate controlled, documented cardiomyopathy both systolic/diastolic however echo only notes diastolic dysfunction, none previous on file  --Echo noted EF is 66 to 70%, mild pulmonary hypertension  --Continue Eliquis for stroke prophylaxis  --Continue Lopressor and dig with holding parameters if able to take p.o.  --Follow-up with cardiology outpatient    #Moderate protein and albumin malnutrition  #Bilateral pleural effusions, left greater than right  #History of pulmonary embolism    CHECKLIST:  Abx: Zosyn  VTE: Eliquis  GI ppx: None  Diet: Consistent carb  Code: CPR, full  Dispo: Patient is high risk due to encephalopathy, sepsis due to suspected urinary tract infection.  Anticipate greater than 2 midnight stay.  Dispo Home versus SNF when stable.    Kirill Fernandez DO  [facility] Center[facility]  12/26/23  15:04 EST      Electronically signed by Kirill Fernandez DO at 12/26/23 2805       Josselin Avalos MD at  12/26/23 1320          Diagnosis: Concern for LBO    No obstruction felt on RADHA or visualized on BE.   CT noted right colon thickening with recommendations for colonoscopy.     Ms Hughes will need to be able to safely tolerate a bowel prep prior to scope in order to adequately visualize her colon wall.     Plan for follow up as outpatient for colonoscopy in 1 month after discharge.     Josselin Chin MD       General Surgeon  [facility]      Electronically signed by Josselin Avalos MD at 12/26/23 1322       Consult Notes (last 48 hours)  Notes from 12/26/23 0547 through 12/28/23 0547   No notes of this type exist for this encounter.

## 2023-12-29 NOTE — CONSULTS
"Assessment:  Diagnosis: acute encephalopathy    No Known Allergies    Order Date/Time: 12/29/2023 1515  Indications: iv abx; difficult access  LABS:  Lab Results   Component Value Date    INR 2.63 (H) 12/29/2023    PROTIME 28.8 (H) 12/29/2023     Lab Results   Component Value Date    PTT 40.4 (H) 12/22/2023     Lab Results   Component Value Date    WBC 6.47 12/29/2023    HGB 8.4 (L) 12/29/2023    HCT 26.0 (L) 12/29/2023    MCV 99.2 (H) 12/29/2023     (L) 12/29/2023     Lab Results   Component Value Date    BUN 35 (H) 12/29/2023     Lab Results   Component Value Date    CREATININE 2.21 (H) 12/29/2023     No results found for: \"EGFRIFNONA\", \"EGFRIFAFRI\"  Labs Reviewed: all labs reviewed; renal function noted    Contraindications for PICC/Midline:  Renal function noted. Per Teresa SALINAS, Dr. Fernandez aware and okay with midline placement at this time.  No other contraindications noted    Recommendations:  PowerGlide Pro Midline Catheter; 18 g; 10 cm  Upper Right Cephalic    Procedure Time Out:  Time out Time: 1530  Correct Patient Identity: Yes  Correct Surgical Side and Site Are Marked: Yes  Agreement on Procedure to be done: Yes  Antibiotic Given: N/A  RN: SHERRY Arreola RN    PowerGlide Pro Midline Catheter placed with ultrasound guidance and verified by blood return. Minimal blood loss noted. Catheter flushes easily. Blood return noted. Patient nurse, Teresa SALINAS, made aware that midline is in upper R arm and ready for use.      Abi Arreola RN    "

## 2023-12-29 NOTE — PROGRESS NOTES
[facility] HOSPITALIST PROGRESS NOTE     Patient Identification:  Name:  Cristina Hughes  Age:  83 y.o.  Sex:  female  :  1940  MRN:  [id number]  Visit Number:  [id number]  ROOM: [address] [place] [address]     Primary Care Provider:  Sunshine Prescott PA    Length of stay in inpatient status:  6    Subjective     Chief Compliant:    Chief Complaint   Patient presents with    Altered Mental Status       History of Presenting Illness:    Patient seen in follow-up for encephalopathy and presumptive urinary tract infection.  Patient has remained hemodynamically stable without further episodes of hypotension.  Patient is lethargic and encephalopathic this morning.  She has remained afebrile, looks much worse today after looking great yesterday despite no changes in medical therapy.     Objective     Current Hospital Meds:apixaban, 5 mg, Oral, Q12H  aspirin, 81 mg, Oral, Daily  [Held by provider] digoxin, 125 mcg, Oral, Once per day on   doxycycline, 100 mg, Intravenous, Q12H  ipratropium, 0.5 mg, Nebulization, 4x Daily - RT  metoprolol tartrate, 50 mg, Oral, Q12H  miconazole, 1 application , Topical, Q12H  piperacillin-tazobactam, 4.5 g, Intravenous, Q8H  promethazine, 6.25 mg, Oral, BID  senna-docusate sodium, 2 tablet, Oral, BID  sodium chloride, 10 mL, Intravenous, Q12H    sodium bicarbonate 8.4 % 150 mEq in sodium chloride 0.45 % 1,000 mL infusion (greater than 75 mEq), 150 mEq          Current Antimicrobial Therapy:  Anti-Infectives (From admission, onward)      Ordered     Dose/Rate Route Frequency Start Stop    23 1307  piperacillin-tazobactam (ZOSYN) IVPB 4.5 g in 100 mL NS VTB        Ordering Provider: Kirill Fernandez DO    4.5 g  over 4 Hours Intravenous Every 8 Hours 23 1303  doxycycline (VIBRAMYCIN) 100 mg in sodium chloride 0.9 % 100 mL IVPB-VTB        Ordering Provider: Kirill Fernandez DO    100 mg  over 60 Minutes Intravenous  Every 12 Hours 12/26/23 1400 01/02/24 1359    12/26/23 1307  piperacillin-tazobactam (ZOSYN) IVPB 4.5 g in 100 mL NS VTB        Ordering Provider: Kirill Fernandez DO    4.5 g  over 30 Minutes Intravenous Once 12/26/23 1400 12/26/23 1534    12/22/23 1454  cefTRIAXone (ROCEPHIN) 1,000 mg in sodium chloride 0.9 % 100 mL IVPB-VTB        Ordering Provider: Milo Narayanan DO    1,000 mg  200 mL/hr over 30 Minutes Intravenous Once 12/22/23 1510 12/22/23 1652    12/22/23 1736  cephalexin (KEFLEX) 500 MG capsule        Ordering Provider: Milo Narayanan DO    500 mg Oral 2 Times Daily 12/22/23 0000            Current Diuretic Therapy:  Diuretics (From admission, onward)      None          ----------------------------------------------------------------------------------------------------------------------  Vital Signs:  Temp:  [97.7 °F (36.5 °C)-98.7 °F (37.1 °C)] 97.7 °F (36.5 °C)  Heart Rate:  [75-89] 78  Resp:  [18-21] 20  BP: ()/(71-90) 92/71  SpO2:  [95 %-98 %] 96 %  on  Flow (L/min):  [3] 3;   Device (Oxygen Therapy): nasal cannula  Body mass index is 44.06 kg/m².    Wt Readings from Last 3 Encounters:   12/29/23 109 kg (240 lb 15.4 oz)   12/22/23 98.4 kg (217 lb)     Intake & Output (last 3 days)         12/26 0701 12/27 0700 12/27 0701 12/28 0700 12/28 0701 12/29 0700 12/29 0701 12/30 0700    P.O. 60 240 620 0    Total Intake(mL/kg) 60 (0.6) 240 (2.2) 620 (5.7) 0 (0)    Urine (mL/kg/hr)  0 (0) 500 (0.2)     Total Output  0 500     Net +60 +240 +120 0            Urine Unmeasured Occurrence 1 x 1 x 1 x     Stool Unmeasured Occurrence 3 x 1 x 1 x           Diet: Regular/House Diet; Feeding Assistance - Nursing; Texture: Soft to Chew (NDD 3); Soft to Chew: Chopped Meat; Fluid Consistency: Thin (IDDSI 0)  ----------------------------------------------------------------------------------------------------------------------  Physical exam:  Constitutional: Elderly, chronically ill-appearing female,  lethargic, encephalopathic HENT:  Head:  Normocephalic and atraumatic.  Mouth:  Moist mucous membranes.    Eyes:  Conjunctivae and EOM are normal. No scleral icterus.   Cardiovascular:  Normal rate, regular rhythm and normal heart sounds with no murmur. No JVD.   Pulmonary/Chest:  No respiratory distress, no wheezes, no crackles, with normal breath sounds and good air movement. Unlabored. No accessory muscle use.  Abdominal:  Soft. No distension and mild generalized tenderness to palpation.  Bowel sounds present. No rebound or guarding.   Musculoskeletal:  No tenderness, and no deformity.  No red or swollen joints anywhere.    Neurological: Lethargic, encephalopathic  Skin:  Skin is warm and dry. No rash noted. No pallor.   Peripheral vascular:  No clubbing, no cyanosis, no edema. Pedal and tibial pulses 2 out of 4 bilaterally.     ----------------------------------------------------------------------------------------------------------------------  Results from last 7 days   Lab Units 12/29/23  1043 12/29/23  0927 12/29/23  0152 12/28/23  0759 12/28/23  0244 12/27/23  0136 12/26/23  0842 12/26/23  0230 12/25/23  1552 12/22/23  1844 12/22/23  1626   CRP mg/dL  --   --   --   --   --   --   --   --  3.87*  --  3.26*   LACTATE mmol/L  --  4.3*  --  5.0* 5.7*  --  3.3*  --  3.1*   < >  --    WBC 10*3/mm3  --   --  6.47  --  6.58 8.65  --    < >  --    < >  --    HEMOGLOBIN g/dL  --   --  8.4*  --  8.9* 10.2*  --    < >  --    < >  --    HEMATOCRIT %  --   --  26.0*  --  27.2* 32.2*  --    < >  --    < >  --    MCV fL  --   --  99.2*  --  97.8* 101.9*  --    < >  --    < >  --    MCHC g/dL  --   --  32.3  --  32.7 31.7  --    < >  --    < >  --    PLATELETS 10*3/mm3  --   --  113*  --  126* 127*  --    < >  --    < >  --    INR  2.63*  --   --   --  2.30*  --  2.09*  --  3.14*  --   --     < > = values in this interval not displayed.     Results from last 7 days   Lab Units 12/25/23  1606   PH, ARTERIAL pH units 7.408  "  PO2 ART mm Hg 168.0*   PCO2, ARTERIAL mm Hg 35.8   HCO3 ART mmol/L 22.6     Results from last 7 days   Lab Units 12/29/23  0927 12/28/23  0244 12/27/23  0136 12/26/23  0230 12/25/23  0057 12/24/23  1511   SODIUM mmol/L 142 140 138   < > 139  --    POTASSIUM mmol/L 3.3* 3.2* 3.9   < > 3.6  --    MAGNESIUM mg/dL  --   --   --   --  2.3 2.4   CHLORIDE mmol/L 106 105 105   < > 104  --    CO2 mmol/L 17.8* 18.6* 17.5*   < > 26.0  --    BUN mg/dL 35* 29* 27*   < > 30*  --    CREATININE mg/dL 2.21* 1.58* 1.33*   < > 1.22*  --    CALCIUM mg/dL 8.9 8.6 8.3*   < > 8.5*  --    GLUCOSE mg/dL 92 101* 87   < > 97  --    ALBUMIN g/dL 2.5* 2.5*  --   --  2.7*  --    BILIRUBIN mg/dL 2.8* 1.7*  --   --  1.0  --    ALK PHOS U/L 208* 213*  --   --  245*  --    AST (SGOT) U/L 55* 42*  --   --  54*  --    ALT (SGPT) U/L 36* 32  --   --  38*  --     < > = values in this interval not displayed.   Estimated Creatinine Clearance: 22.4 mL/min (A) (by C-G formula based on SCr of 2.21 mg/dL (H)).  No results found for: \"AMMONIA\"    Results from last 7 days   Lab Units 12/22/23  1626 12/22/23  1424   HSTROP T ng/L 27* 25*             Glucose   Date/Time Value Ref Range Status   12/28/2023 1147 105 70 - 130 mg/dL Final     No results found for: \"TSH\", \"FREET4\"  No results found for: \"PREGTESTUR\", \"PREGSERUM\", \"HCG\", \"HCGQUANT\"  Pain Management Panel           No data to display              Brief Urine Lab Results  (Last result in the past 365 days)        Color   Clarity   Blood   Leuk Est   Nitrite   Protein   CREAT   Urine HCG        12/22/23 1317 Yellow   Cloudy   Moderate (2+)   Moderate (2+)   Negative   Negative                 Blood Culture   Date Value Ref Range Status   12/22/2023 No growth at 2 days  Preliminary   12/22/2023 No growth at 2 days  Preliminary     Urine Culture   Date Value Ref Range Status   12/22/2023 Yeast isolated (A)  Final     Comment:     No further workup.     No results found for: \"WOUNDCX\"  No results found " "for: \"STOOLCX\"  No results found for: \"RESPCX\"  No results found for: \"AFBCX\"  Results from last 7 days   Lab Units 12/29/23  0927 12/28/23  0759 12/28/23  0244 12/26/23  0842 12/25/23  1555 12/25/23  1552 12/23/23  0156 12/22/23  2201 12/22/23  1844 12/22/23  1626   PROCALCITONIN ng/mL  --   --   --   --   --  0.15 0.14  --   --   --    LACTATE mmol/L 4.3* 5.0* 5.7* 3.3*  --  3.1* 2.0 2.2*   < >  --    SED RATE mm/hr  --   --   --   --  6  --   --   --   --   --    CRP mg/dL  --   --   --   --   --  3.87*  --   --   --  3.26*    < > = values in this interval not displayed.       I have personally looked at the labs and they are summarized above.  ----------------------------------------------------------------------------------------------------------------------  Detailed radiology reports for the last 24 hours:  Imaging Results (Last 24 Hours)       Procedure Component Value Units Date/Time    US Gallbladder [[id number]] Collected: 12/29/23 1144     Updated: 12/29/23 1147    Narrative:      US GALLBLADDER-     CLINICAL INDICATION: cholecystitis?; N39.0-Urinary tract infection, site  not specified; R41.82-Altered mental status, unspecified        COMPARISON: None immediately available         PROCEDURE AND FINDINGS:     Sonographic imaging of the gallbladder reveals evidence of  cholelithiasis.  There is no pericholecystic fluid.  The wall of the gallbladder measures 3.7 mm.  The common bile duct measures 2.38 mm.             Impression:      Impression: Cholelithiasis and gallbladder wall thickening.     This report was finalized on 12/29/2023 11:45 AM by Dr. Abhinav Riley MD.             Assessment & Plan      Patient is an 83-year-old female with history significant for PE, dilated cardiomyopathy, atrial fibrillation with pulmonary hypertension who presented to the ER with reports of AMS per family.    #Acute cholangitis?  #Direct hyperbilirubinemia  #Possible colitis  #Ileus versus partial large bowel " obstruction  #Acute hepatitis  --CT abdomen/pelvis noted multiple distended large bowel segments consistent of ileus versus partial distal large bowel obstruction, small volume of ascites  --MRCP without any features of choledocholithiasis, cholelithiasis noted  --Initial elevation in transaminases and direct hyperbilirubinemia, but these are currently improving  --Repeat CT abdomen pelvis noted mild colonic distention with fluid and stool-filled, thickening of the right colon wall may represent colitis  --Acute hepatitis panel negative  --No nausea or vomiting or GI symptoms  --Patient looked great on 12/28, lethargic and encephalopathic today.  Bilirubin has continued to increase, T. bili 2.8 with a direct bili of 1.9 with increased/elevated INR which may be multifactorial due to vitamin deficiency as it did improve at 1 point with vitamin K.  Had an MRCP earlier that noted cholelithiasis without any evidence of ductal dilation.  Stat GB ultrasound noted cholelithiasis, GB wall thickening without comment on the duct.  --Repeat MRCP, potentially could have developed choledocholithiasis-now with confusion acute cholangitis but has been on Zosyn  --If MRCP unremarkable, HIDA scan in a.m.  --Multiple bowel movements her last several days, no evidence of ileus or SBO, general surgery following, plan for outpatient scope  --Continue Zosyn for intra-abdominal coverage    #Acute metabolic encephalopathy  #Sepsis d/t suspected acute urinary tract infection, POA  --Patient presented with reports of AMS per family, recently admitted to [facility] on 11/26 due to weakness and AMS-at that time was diagnosed with VRE and completed a course of oral Zyvox.  At that time also found to have a distended colon felt to be consistent with [place]'s  --ABG unremarkable on 3 L  --CT head negative  --CT angios without evidence of LVO  --MRI brain cerebellar and cerebral atrophy  --Labs repeated, ABG unremarkable  --Urine culture  yeast, contaminant-possibly skewed given recent antibiotic use  --Bladder scan negative  --patient is intermittently hard to arouse however then will respond sometimes curse staff.  Intermittently hypotensive, hard to arouse again today but then when family arrived she stayed awake and talk to them as they visited  --As urine culture is negative, antibiotics broadened to Zosyn to include cover potential colitis noted on CT    #Dementia without behavioral disturbances   --I discussed with son at length on 12/26, noted decline over the past year after  had passed.  Noted prior to any acute illnesses while at home she would be very forgetful and hallucinate-he stated she would see people at home in her house    #Cardiomyopathy  #Essential hypertension  #Atrial fibrillation  --Rate controlled, documented cardiomyopathy both systolic/diastolic however echo only notes diastolic dysfunction, none previous on file  --Echo noted EF is 66 to 70%, mild pulmonary hypertension  --Continue Eliquis for stroke prophylaxis  --Continue Lopressor and dig with holding parameters if able to take p.o.  --Follow-up with cardiology outpatient    #Moderate protein and albumin malnutrition  #Bilateral pleural effusions, left greater than right  #History of pulmonary embolism    CHECKLIST:  Abx: Zosyn  VTE: Eliquis  GI ppx: None  Diet: Consistent carb  Code: No CPR, limited  Dispo: Patient look great yesterday, is encephalopathic and lethargic today.  Further workup needed and will be here over the weekend.    Kirill Fernandez,   [facility] Hospitalist  12/29/23  13:15 EST

## 2023-12-29 NOTE — PLAN OF CARE
Goal Outcome Evaluation:  Plan of Care Reviewed With: patient           Outcome Evaluation: Patient rested in bed this shift. Patient has remained somnolent, responding to pain. Patient did speak when RN attempted PIV access. Patient has been unable to take any oral medications, fluids or food. Will continue POC.

## 2023-12-29 NOTE — PLAN OF CARE
Goal Outcome Evaluation:              Outcome Evaluation: Pt rested in bed this shift and still confused. No new complaints or concerns, VSS and will continue with POC.

## 2023-12-29 NOTE — PROGRESS NOTES
Antimicrobial Length of Therapy:    Day 4/7 Doxycycline  Day 4/7 Zosyn    Thank you,    Faby Medrano, PharmD

## 2023-12-29 NOTE — CASE MANAGEMENT/SOCIAL WORK
Discharge Planning Assessment  [facility]     Patient Name: Cristina Hughes  MRN: [id number]  Today's Date: 12/29/2023    Admit Date: 12/22/2023            Discharge Plan       Row Name 12/29/23 1328       Plan    Plan SS spoke with [facility] per Satinder who states Pt's pre-auth has been approved and facility can accept today if medically stable. SS notified Dr. Fernandez. Per Dr. Fernandez Pt is not medically stable. SS notified [facility] per Satinder who states if Pt is not medically stable today, facility will not be able to accept Pt until Tuesday, 01/02/23 due to pre-auth expiring and the holiday. SS notified Dr. Fernandez. SS to follow.                  JACOB MonetW

## 2023-12-30 NOTE — NURSING NOTE
2245-  on floor, notified him of three new skin tears including one on her lower abdomen that occurred while bladder scanning her. Notified MD that last recorded UOP was 500 ml on 12/28 at 2300 and bladder scan at 2100 showed 65 ml. I inquired about placing a mckeon cath both for better monitoring of UOP and to prevent further skin breakdown at site of tear. MD stated that since pt was not showing signs of  retention at this time a mckeon was not neccessary at this time and to decrease frequency of bladder scans.

## 2023-12-30 NOTE — DISCHARGE SUMMARY
[facility] HOSPITALISTS DISCHARGE SUMMARY    Patient Identification:  Name:  Cristina Hughes  Age:  83 y.o.  Sex:  female  :  1940  MRN:  [id number]  Visit Number:  [id number]    Date of Admission: 2023  Date of Discharge:  2023     PCP: Sunshine Prescott PA    DISCHARGE DIAGNOSIS  #Comfort measures  #Acute cholangitis, possible  #Direct hyperbilirubinemia  #Possible colitis  #Ileus versus partial large bowel obstruction  #Acute hepatitis  #Acute metabolic encephalopathy  #Sepsis d/t suspected acute urinary tract infection, POA  #Acute blood loss anemia  #Dementia without behavioral disturbances   #Cardiomyopathy  #Essential hypertension  #Atrial fibrillation  #Moderate protein and albumin malnutrition  #Bilateral pleural effusions, left greater than right  #History of pulmonary embolism    CONSULTS   General surgery    PROCEDURES PERFORMED  none    HOSPITAL COURSE  Patient is a 83 y.o. female presented to [facility] with reports of altered mental status per family.  Please see the admitting history and physical for further details.  Patient had recently been admitted at [facility] on  for similar symptoms.  At that time she was diagnosed with VRE and completed a course of Zyvox.  She was also noted to have a distended colon which felt to be consistent with Anselmo's and was evaluated by general surgery also at that time.    On admission, imaging noted multiple distended large bowel segments consistent of ileus versus partial large bowel obstruction with small volume ascites.  General surgery was consulted and followed throughout the hospital course.  She had elevated direct hyperbilirubinemia however MRCP was without any features suggestive of choledocholithiasis, only cholelithiasis was noted.  Initial elevation in transaminases and hyperbilirubinemia did improve however as the hospital course progressed, the started to increase once again.    Earlier  during my week of care, she had intermittent spells of hypotension and would be lethargic and then on reexamination will be alert to self and looks significantly better without any complaints.  Initially antibiotics were more narrow but given poor progression throughout the week, was broadened out to include MDR organisms and intra-abdominal coverage.  Labs overall were unremarkable aside from climbing bilirubin and further in the week persistently elevated lactic acidosis.  On 12/28, patient appeared to be back to baseline with considerations to discharge the following day.  On 12/29, she was lethargic and encephalopathic as she was earlier in the week.  She received IV vitamin K with improvement of PT/INR however hyperbilirubinemia persisted.  A repeat MRCP was ordered without evidence of choledocholithiasis and noted the same as prior.  A repeat GB ultrasound noted cholelithiasis with GB wall thickening without any evidence of cholecystitis.    On the morning of 12/30, patient looks significantly worse from a clinical standpoint with increasing lethargy and a lactic acidosis that was 8.9.  I discussed with patient's son, Argenis, patient's overall condition and poor prognosis.  I discussed with him potential need for HIDA scan to further evaluate cholecystitis as there were no other source of infection as all culture data had remained negative and need for central line, vasopressors and CCU level of care.  He was very quick early and that conversation to state he would not want any aggressive measures to be taken and agreed that making her comfortable allowing her to pass peacefully would be his preference of choice.    Patient was transition to comfort measures on 12/30 and ultimately passed at 1743, pronounced by house supervisor.    VITAL SIGNS:  N/a    Body mass index is 42.9 kg/m².  Wt Readings from Last 3 Encounters:   12/30/23 106 kg (234 lb 9.6 oz)   12/22/23 98.4 kg (217 lb)       PHYSICAL EXAM (performed  by house supervisor):  Constitutional: Unresponsive  HEENT: Pupils fixed and dilated, nonreactive  Cardiovascular: No heart sounds auscultated across the pericardium  Pulmonary/Chest: No spontaneous chest rise or breath sounds  Neurological: Unresponsive  Peripheral vascular: no pedal, radial, carotid or femoral pulses appreciated    DISCHARGE DISPOSITION       DISCHARGE MEDICATIONS:   N/a    TEST  RESULTS PENDING AT DISCHARGE  n/a     CODE STATUS  Code Status and Medical Interventions:   Ordered at: 23 0946     Level Of Support Discussed With:    Health Care Surrogate     Code Status (Patient has no pulse and is not breathing):    No CPR (Do Not Attempt to Resuscitate)     Medical Interventions (Patient has pulse or is breathing):    Comfort Measures       Kirill Fernandez DO  [facility] Hospitalist  23  17:50 EST    Please note that this discharge summary required more than 30 minutes to complete.

## 2023-12-30 NOTE — PLAN OF CARE
Goal Outcome Evaluation:  Plan of Care Reviewed With: patient        Progress: declining  Outcome Evaluation: Patient comfort measures maintained. Pt has family at bedside. Pt seem comfortable at this time. Will follow POC.

## 2023-12-30 NOTE — PLAN OF CARE
Goal Outcome Evaluation:  Plan of Care Reviewed With: patient        Progress: no change  Outcome Evaluation: Pt has rested in bed overnight. VSS. Pt has remained drowsy but arousable. Wound care completed per orders. Pt still anuric, bladder scans not showing retention, MD aware. Will continue POC.

## 2023-12-30 NOTE — PROGRESS NOTES
[facility] HOSPITALIST PROGRESS NOTE     Patient Identification:  Name:  Cristina Hughes  Age:  83 y.o.  Sex:  female  :  1940  MRN:  [id number]  Visit Number:  [id number]  ROOM: [address] [place] [address]     Primary Care Provider:  Sunshine Prescott PA    Length of stay in inpatient status:  7    Subjective     Chief Compliant:    Chief Complaint   Patient presents with    Altered Mental Status       History of Presenting Illness:    Patient seen in follow-up for encephalopathy and presumptive urinary tract infection.  Patient's hypotensive this morning with difficulty to read BP.  Remains lethargic, looks worse than yesterday.  Imaging and labs reviewed.  Discussed with family who after goals of care discussion, requested to proceed with comfort measures.    Objective     Current Hospital Meds:glycopyrrolate, 0.4 mg, Intravenous, Once             Current Antimicrobial Therapy:  Anti-Infectives (From admission, onward)      Ordered     Dose/Rate Route Frequency Start Stop    23 1307  piperacillin-tazobactam (ZOSYN) IVPB 4.5 g in 100 mL NS VTB        Ordering Provider: Kirill Fernandez DO    4.5 g  over 30 Minutes Intravenous Once 23 1400 23 1534    23 1454  cefTRIAXone (ROCEPHIN) 1,000 mg in sodium chloride 0.9 % 100 mL IVPB-VTB        Ordering Provider: Milo Narayanan DO    1,000 mg  200 mL/hr over 30 Minutes Intravenous Once 23 1510 23 1652    23 1736  cephalexin (KEFLEX) 500 MG capsule        Ordering Provider: Milo Narayanan DO    500 mg Oral 2 Times Daily 23 0000            Current Diuretic Therapy:  Diuretics (From admission, onward)      None          ----------------------------------------------------------------------------------------------------------------------  Vital Signs:  Temp:  [96.5 °F (35.8 °C)-96.7 °F (35.9 °C)] 96.7 °F (35.9 °C)  Heart Rate:  [73-98] 90  Resp:  [18-22] 20  BP: ()/(43-56) 98/52  SpO2:  [95 %-97 %] 97 %   on  Flow (L/min):  [3] 3;   Device (Oxygen Therapy): nasal cannula  Body mass index is 42.9 kg/m².    Wt Readings from Last 3 Encounters:   12/30/23 106 kg (234 lb 9.6 oz)   12/22/23 98.4 kg (217 lb)     Intake & Output (last 3 days)         12/27 0701  12/28 0700 12/28 0701  12/29 0700 12/29 0701 12/30 0700 12/30 0701  12/31 0700    P.O. 240 620 0     I.V. (mL/kg)   858.3 (8.1)     IV Piggyback   1800     Total Intake(mL/kg) 240 (2.2) 620 (5.7) 2658.3 (25.1)     Urine (mL/kg/hr) 0 (0) 500 (0.2) 0 (0)     Total Output 0 500 0     Net +240 +120 +2658.3             Urine Unmeasured Occurrence 1 x 1 x 0 x     Stool Unmeasured Occurrence 1 x 1 x            NPO Diet NPO Type: Strict NPO  ----------------------------------------------------------------------------------------------------------------------  Physical exam:  Constitutional: Elderly, chronically ill-appearing female, lethargic, encephalopathic   HENT:  Head:  Normocephalic and atraumatic.  Mouth: Dry mucous membranes.    Eyes:  Conjunctivae and EOM are normal. No scleral icterus.   Cardiovascular:  Normal rate, regular rhythm and normal heart sounds with no murmur. No JVD.   Pulmonary/Chest:  No respiratory distress, no wheezes, no crackles, with normal breath sounds and good air movement. Unlabored. No accessory muscle use.  Abdominal: Soft, no pain on palpation  Neurological: Lethargic, encephalopathic  Skin:  Skin is warm and dry. No rash noted. No pallor.   Peripheral vascular: Pedal, radial and tibial pulses palpable    ----------------------------------------------------------------------------------------------------------------------  Results from last 7 days   Lab Units 12/30/23  0758 12/30/23  0757 12/30/23  0240 12/29/23  1043 12/29/23  0927 12/29/23  0152 12/28/23  0759 12/28/23  0244 12/27/23  0136 12/26/23  0842 12/26/23  0230 12/25/23  1552   CRP mg/dL  --   --   --   --   --   --   --   --   --   --   --  3.87*   LACTATE mmol/L 8.9*  --   --    --  4.3*  --  5.0* 5.7*  --  3.3*  --  3.1*   WBC 10*3/mm3  --   --  6.43  --   --  6.47  --  6.58   < >  --    < >  --    HEMOGLOBIN g/dL  --  6.5* 8.4*  --   --  8.4*  --  8.9*   < >  --    < >  --    HEMATOCRIT %  --  20.3* 26.4*  --   --  26.0*  --  27.2*   < >  --    < >  --    MCV fL  --   --  100.0*  --   --  99.2*  --  97.8*   < >  --    < >  --    MCHC g/dL  --   --  31.8  --   --  32.3  --  32.7   < >  --    < >  --    PLATELETS 10*3/mm3  --   --  116*  --   --  113*  --  126*   < >  --    < >  --    INR   --   --  2.55* 2.63*  --   --   --  2.30*  --  2.09*  --  3.14*    < > = values in this interval not displayed.     Results from last 7 days   Lab Units 12/25/23  1606   PH, ARTERIAL pH units 7.408   PO2 ART mm Hg 168.0*   PCO2, ARTERIAL mm Hg 35.8   HCO3 ART mmol/L 22.6     Results from last 7 days   Lab Units 12/30/23  0240 12/29/23  0927 12/28/23  0244 12/26/23  0230 12/25/23  0057 12/24/23  1511 12/24/23  0701   SODIUM mmol/L 145 142 140   < > 139  --   --    POTASSIUM mmol/L 3.1* 3.3* 3.2*   < > 3.6  --   --    MAGNESIUM mg/dL  --   --   --   --  2.3 2.4  --    CHLORIDE mmol/L 106 106 105   < > 104  --   --    CO2 mmol/L 18.4* 17.8* 18.6*   < > 26.0  --   --    BUN mg/dL 40* 35* 29*   < > 30*  --   --    CREATININE mg/dL 2.51* 2.21* 1.58*   < > 1.22*  --   --    CALCIUM mg/dL 8.9 8.9 8.6   < > 8.5*  --   --    GLUCOSE mg/dL 84 92 101*   < > 97  --   --    ALBUMIN g/dL 2.7* 2.5* 2.5*  --  2.7*  --    < >   BILIRUBIN mg/dL 3.1* 2.8* 1.7*  --  1.0  --    < >   ALK PHOS U/L 193* 208* 213*  --  245*  --    < >   AST (SGOT) U/L 48* 55* 42*  --  54*  --    < >   ALT (SGPT) U/L 38* 36* 32  --  38*  --    < >    < > = values in this interval not displayed.   Estimated Creatinine Clearance: 19.4 mL/min (A) (by C-G formula based on SCr of 2.51 mg/dL (H)).  Ammonia   Date Value Ref Range Status   12/30/2023 26 11 - 51 umol/L Final       Results from last 7 days   Lab Units 12/29/23  0927   CK TOTAL U/L 99       "       Glucose   Date/Time Value Ref Range Status   12/28/2023 1147 105 70 - 130 mg/dL Final     No results found for: \"TSH\", \"FREET4\"  No results found for: \"PREGTESTUR\", \"PREGSERUM\", \"HCG\", \"HCGQUANT\"  Pain Management Panel           No data to display              Brief Urine Lab Results  (Last result in the past 365 days)        Color   Clarity   Blood   Leuk Est   Nitrite   Protein   CREAT   Urine HCG        12/22/23 1317 Yellow   Cloudy   Moderate (2+)   Moderate (2+)   Negative   Negative                 Blood Culture   Date Value Ref Range Status   12/22/2023 No growth at 2 days  Preliminary   12/22/2023 No growth at 2 days  Preliminary     Urine Culture   Date Value Ref Range Status   12/22/2023 Yeast isolated (A)  Final     Comment:     No further workup.     No results found for: \"WOUNDCX\"  No results found for: \"STOOLCX\"  No results found for: \"RESPCX\"  No results found for: \"AFBCX\"  Results from last 7 days   Lab Units 12/30/23  0758 12/29/23  0927 12/28/23  0759 12/28/23  0244 12/26/23  0842 12/25/23  1555 12/25/23  1552   PROCALCITONIN ng/mL  --   --   --   --   --   --  0.15   LACTATE mmol/L 8.9* 4.3* 5.0* 5.7* 3.3*  --  3.1*   SED RATE mm/hr  --   --   --   --   --  6  --    CRP mg/dL  --   --   --   --   --   --  3.87*       I have personally looked at the labs and they are summarized above.  ----------------------------------------------------------------------------------------------------------------------  Detailed radiology reports for the last 24 hours:  Imaging Results (Last 24 Hours)       Procedure Component Value Units Date/Time    MRI abdomen wo contrast mrcp [[id number]] Collected: 12/29/23 1847     Updated: 12/29/23 1852    Narrative:            Procedure: Multiplanar multisequence imaging was obtained through the  abdomen without intravenous contrast, according to the MRCP protocol.       Comparison: MRCP 12/22/2023, CT abdomen pelvis 12/22/2023, gallbladder  ultrasound 12/22/2023.   "   Findings: Examination is limited due to motion artifact.       Numerous gallstones are present within the lumen of the gallbladder,  unchanged.  Gallbladder mildly dilated with no wall thickening,  intrahepatic or extrahepatic ductal dilatation.  Appearance of the  gallbladder is unchanged when compared to 12/22/2023.  No obstructing  calculus in the common bile duct.  No gallbladder wall thickening or  pericholecystic fluid.     Small to moderate bilateral pleural effusions present, unchanged.  Small  amount of ascites also noted, unchanged.  MRI appearance of the  noncontrast enhanced liver, spleen, pancreas, and stomach is unchanged.   Small hiatal hernia present.  Possible nodular contour of the liver  which would suggest hepatic cirrhosis.     Aorta is normal caliber.  Kidneys are unchanged in size.  Bowel,  unchanged.  A limited examination     Dilated loops of loops of bowel again noted, with apparent diffuse wall  edema and large amount of formed stool in the sigmoid colon.     Body wall edema noted partially within the field-of-view.  No acute  osseous abnormality.       Impression:         1.  Cholelithiasis with no MRI evidence of choledocholithiasis,  intrahepatic or extrahepatic ductal dilatation.  Gallbladder is mildly  distended with no gallbladder wall thickening or pericholecystic fluid.     2.  Dilated loops of bowel and bowel wall edema within the field-of-view  which could be further evaluated with CT abdomen pelvis if appropriate.     3.  Bilateral pleural effusions, left greater than right, unchanged.     4.  Mild ascites, unchanged.        This report was finalized on 12/29/2023 6:50 PM by Wendy Pinedo MD.       US Gallbladder [[id number]] Collected: 12/29/23 1144     Updated: 12/29/23 1147    Narrative:      US GALLBLADDER-     CLINICAL INDICATION: cholecystitis?; N39.0-Urinary tract infection, site  not specified; R41.82-Altered mental status, unspecified        COMPARISON: None  immediately available         PROCEDURE AND FINDINGS:     Sonographic imaging of the gallbladder reveals evidence of  cholelithiasis.  There is no pericholecystic fluid.  The wall of the gallbladder measures 3.7 mm.  The common bile duct measures 2.38 mm.             Impression:      Impression: Cholelithiasis and gallbladder wall thickening.     This report was finalized on 12/29/2023 11:45 AM by Dr. Abhinav Riley MD.             Assessment & Plan      Patient is an 83-year-old female with history significant for PE, dilated cardiomyopathy, atrial fibrillation with pulmonary hypertension who presented to the ER with reports of AMS per family.    #Comfort measures  #Acute cholangitis?  #Direct hyperbilirubinemia  #Possible colitis  #Ileus versus partial large bowel obstruction  #Acute hepatitis  #Acute metabolic encephalopathy  #Sepsis d/t suspected acute urinary tract infection, POA  #Acute blood loss anemia  --Patient presented with reports of AMS per family, recently admitted to [facility] on 11/26 due to weakness and AMS-at that time was diagnosed with VRE and completed a course of oral Zyvox.  At that time also found to have a distended colon felt to be consistent with [place]'s  --CT abdomen/pelvis noted multiple distended large bowel segments consistent of ileus versus partial distal large bowel obstruction, small volume of ascites  --MRCP without any features of choledocholithiasis, cholelithiasis noted  --Initial elevation in transaminases and direct hyperbilirubinemia, these did improve however currently increased from even on admission  --Repeat CT abdomen pelvis noted mild colonic distention with fluid and stool-filled, thickening of the right colon wall may represent colitis  --Repeat MRCP negative for choledocholithiasis  --Acute hepatitis panel negative  --No nausea or vomiting or GI symptoms  --Patient looked great on 12/28, lethargic and encephalopathic today.  Bilirubin has continued to  increase, T. bili, INR and transaminases increased, which may be multifactorial due to vitamin deficiency as it did improve at 1 point with vitamin K.  She has had 2 negative MRCP's and GB ultrasound noting cholelithiasis and GB wall thickening without acute cholecystitis.  Given confusion, considered acute cholangitis but since earlier in the week, she had been on Zosyn, had also been followed by general surgery with waxing and waning status that is continued to deteriorate over the past 24 to 48 hours.  --This morning patient looks significantly worse, lethargic with increasing lactic acid 8.9.  Difficulty obtaining cuff BP.  I discussed goals of care with her son/TRISTEN, Argenis, prior to HIDA scan-these discussions included his desire for central line placement, vasopressors, CCU level of care and all aggressive measurements.  He was very quick early in the conversation to state he would not want these measures to be taken and agreed it was best to make her comfortable, preserve her dignity and allow her to pass peacefully  -- Comfort measures initiated, as needed morphine, Versed (no IV Ativan available), Robinul for secretions  --Please do not consult wound care for any pressure injuries as patient is comfort care and should be made comfortable and allowed to pass with dignity.  --Patient also does not need oral care, turns or any other nursing driven protocol that would not be aligned with prioritizing patient comfort unless requested by family.  --Patient transition to comfort measures, continue care on MedSurg    #Dementia without behavioral disturbances   #Cardiomyopathy  #Essential hypertension  #Atrial fibrillation  #Moderate protein and albumin malnutrition  #Bilateral pleural effusions, left greater than right  #History of pulmonary embolism    CHECKLIST:  Abx: None  VTE: None  GI ppx: None  Diet: N.p.o.  Code: No CPR, comfort measures  Dispo: Patient has been transition to comfort measures.  Anticipate  celestial discharge.    Kirill Fernandez DO  [facility] Hospitalist  12/30/23  09:48 EST

## 2024-01-03 NOTE — PAYOR COMM NOTE
"CONTACT:  CHERRY DELCID RN  UTILIZATION MANAGEMENT DEPT.   [facility]   [address] [place], [address]   PHONE:  [phone]   FAX: [phone]        2023    REF# [id number].      Franchesca Hannah (Dcsd. Female)       Date of Birth   1940    Social Security Number       Address   [address] [address]    Home Phone   [phone]    MRN   [id number]       Latter day   Unknown    Marital Status                               Admission Date   23    Admission Type   Emergency    Admitting Provider   Kirill Fernandez DO    Attending Provider       Department, Room/Bed   [address]/       Discharge Date   2023    Discharge Disposition       Discharge Destination                                 Attending Provider: (none)   Allergies: No Known Allergies    Isolation: None   Infection: None   Code Status: Prior    Ht: 157.5 cm (62.01\")   Wt: 106 kg (234 lb 9.6 oz)    Admission Cmt: None   Principal Problem: Acute encephalopathy [G93.40]                   Active Insurance as of 2023       Primary Coverage       Payor Plan Insurance Group Employer/Plan Group    ANTHEM MEDICARE REPLACEMENT ANTHEM MEDICARE ADVANTAGE [id number]       Payor Plan Address Payor Plan Phone Number Payor Plan Fax Number Effective Dates    [address] [phone]  2022 - None Entered    [facility] [address]         Subscriber Name Subscriber Birth Date Member ID       FRANCHESCA HANNAH 1940 [id number]                     Emergency Contacts        (Rel.) Home Phone Work Phone Mobile Phone    Argenis hannah (Son) [phone] -- --    Marisabel Long (Other) -- -- [phone]                 Discharge Summary        Kirill Fernandez DO at 23 1750              [facility]    Patient Identification:  Name:  Franchesca Hannah  Age:  83 y.o.  Sex:  female  :  " 1940  MRN:  [id number]  Visit Number:  [id number]    Date of Admission: 12/22/2023  Date of Discharge:  12/30/2023     PCP: Sunshine Prescott PA    DISCHARGE DIAGNOSIS  #Comfort measures  #Acute cholangitis, possible  #Direct hyperbilirubinemia  #Possible colitis  #Ileus versus partial large bowel obstruction  #Acute hepatitis  #Acute metabolic encephalopathy  #Sepsis d/t suspected acute urinary tract infection, POA  #Acute blood loss anemia  #Dementia without behavioral disturbances   #Cardiomyopathy  #Essential hypertension  #Atrial fibrillation  #Moderate protein and albumin malnutrition  #Bilateral pleural effusions, left greater than right  #History of pulmonary embolism    CONSULTS   General surgery    PROCEDURES PERFORMED  none    HOSPITAL COURSE  Patient is a 83 y.o. female presented to [facility] with reports of altered mental status per family.  Please see the admitting history and physical for further details.  Patient had recently been admitted at [facility] on 11/26 for similar symptoms.  At that time she was diagnosed with VRE and completed a course of Zyvox.  She was also noted to have a distended colon which felt to be consistent with [place]'s and was evaluated by general surgery also at that time.    On admission, imaging noted multiple distended large bowel segments consistent of ileus versus partial large bowel obstruction with small volume ascites.  General surgery was consulted and followed throughout the hospital course.  She had elevated direct hyperbilirubinemia however MRCP was without any features suggestive of choledocholithiasis, only cholelithiasis was noted.  Initial elevation in transaminases and hyperbilirubinemia did improve however as the hospital course progressed, the started to increase once again.    Earlier during my week of care, she had intermittent spells of hypotension and would be lethargic and then on reexamination will be alert to self and  looks significantly better without any complaints.  Initially antibiotics were more narrow but given poor progression throughout the week, was broadened out to include MDR organisms and intra-abdominal coverage.  Labs overall were unremarkable aside from climbing bilirubin and further in the week persistently elevated lactic acidosis.  On 12/28, patient appeared to be back to baseline with considerations to discharge the following day.  On 12/29, she was lethargic and encephalopathic as she was earlier in the week.  She received IV vitamin K with improvement of PT/INR however hyperbilirubinemia persisted.  A repeat MRCP was ordered without evidence of choledocholithiasis and noted the same as prior.  A repeat GB ultrasound noted cholelithiasis with GB wall thickening without any evidence of cholecystitis.    On the morning of 12/30, patient looks significantly worse from a clinical standpoint with increasing lethargy and a lactic acidosis that was 8.9.  I discussed with patient's son, Argenis, patient's overall condition and poor prognosis.  I discussed with him potential need for HIDA scan to further evaluate cholecystitis as there were no other source of infection as all culture data had remained negative and need for central line, vasopressors and CCU level of care.  He was very quick early and that conversation to state he would not want any aggressive measures to be taken and agreed that making her comfortable allowing her to pass peacefully would be his preference of choice.    Patient was transition to comfort measures on 12/30 and ultimately passed at 1743, pronounced by house supervisor.    VITAL SIGNS:  N/a    Body mass index is 42.9 kg/m².  Wt Readings from Last 3 Encounters:   12/30/23 106 kg (234 lb 9.6 oz)   12/22/23 98.4 kg (217 lb)       PHYSICAL EXAM (performed by house supervisor):  Constitutional: Unresponsive  HEENT: Pupils fixed and dilated, nonreactive  Cardiovascular: No heart sounds  auscultated across the pericardium  Pulmonary/Chest: No spontaneous chest rise or breath sounds  Neurological: Unresponsive  Peripheral vascular: no pedal, radial, carotid or femoral pulses appreciated    DISCHARGE DISPOSITION       DISCHARGE MEDICATIONS:   N/a    TEST  RESULTS PENDING AT DISCHARGE  n/a     CODE STATUS  Code Status and Medical Interventions:   Ordered at: 23 0980     Level Of Support Discussed With:    Health Care Surrogate     Code Status (Patient has no pulse and is not breathing):    No CPR (Do Not Attempt to Resuscitate)     Medical Interventions (Patient has pulse or is breathing):    Comfort Measures       Kirill Fernandez DO  [facility] Hospitalist  23  17:50 EST    Please note that this discharge summary required more than 30 minutes to complete.      Electronically signed by Kirill Fernandez DO at 23 3935       Discharge Order (From admission, onward)      None